# Patient Record
Sex: MALE | Race: WHITE | NOT HISPANIC OR LATINO | Employment: FULL TIME | URBAN - METROPOLITAN AREA
[De-identification: names, ages, dates, MRNs, and addresses within clinical notes are randomized per-mention and may not be internally consistent; named-entity substitution may affect disease eponyms.]

---

## 2017-08-15 ENCOUNTER — GENERIC CONVERSION - ENCOUNTER (OUTPATIENT)
Dept: OTHER | Facility: OTHER | Age: 27
End: 2017-08-15

## 2017-11-29 ENCOUNTER — ALLSCRIPTS OFFICE VISIT (OUTPATIENT)
Dept: OTHER | Facility: OTHER | Age: 27
End: 2017-11-29
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
## 2017-12-17 ENCOUNTER — LAB CONVERSION - ENCOUNTER (OUTPATIENT)
Dept: OTHER | Facility: OTHER | Age: 27
End: 2017-12-17

## 2017-12-17 LAB
25(OH)D3 SERPL-MCNC: 26 NG/ML (ref 30–100)
A/G RATIO (HISTORICAL): 1.5 (CALC) (ref 1–2.5)
ALBUMIN SERPL BCP-MCNC: 4.4 G/DL (ref 3.6–5.1)
ALP SERPL-CCNC: 49 U/L (ref 40–115)
ALT SERPL W P-5'-P-CCNC: 65 U/L (ref 9–46)
AST SERPL W P-5'-P-CCNC: 37 U/L (ref 10–40)
BASOPHILS # BLD AUTO: 0.3 %
BASOPHILS # BLD AUTO: 20 CELLS/UL (ref 0–200)
BILIRUB SERPL-MCNC: 1 MG/DL (ref 0.2–1.2)
BUN SERPL-MCNC: 15 MG/DL (ref 7–25)
BUN/CREA RATIO (HISTORICAL): 10 (CALC) (ref 6–22)
CALCIUM SERPL-MCNC: 10 MG/DL (ref 8.6–10.3)
CHLORIDE SERPL-SCNC: 99 MMOL/L (ref 98–110)
CHOLEST SERPL-MCNC: 280 MG/DL
CHOLEST/HDLC SERPL: 8 (CALC)
CO2 SERPL-SCNC: 31 MMOL/L (ref 20–31)
CREAT SERPL-MCNC: 1.54 MG/DL (ref 0.6–1.35)
DEPRECATED RDW RBC AUTO: 11.9 % (ref 11–15)
EGFR AFRICAN AMERICAN (HISTORICAL): 70 ML/MIN/1.73M2
EGFR-AMERICAN CALC (HISTORICAL): 61 ML/MIN/1.73M2
EOSINOPHIL # BLD AUTO: 299 CELLS/UL (ref 15–500)
EOSINOPHIL # BLD AUTO: 4.4 %
GAMMA GLOBULIN (HISTORICAL): 2.9 G/DL (CALC) (ref 1.9–3.7)
GLUCOSE (HISTORICAL): 75 MG/DL (ref 65–99)
HCT VFR BLD AUTO: 50.7 % (ref 38.5–50)
HDLC SERPL-MCNC: 35 MG/DL
HGB BLD-MCNC: 17.9 G/DL (ref 13.2–17.1)
LDL CHOLESTEROL (HISTORICAL): 200 MG/DL (CALC)
LYMPHOCYTES # BLD AUTO: 2496 CELLS/UL (ref 850–3900)
LYMPHOCYTES # BLD AUTO: 36.7 %
MCH RBC QN AUTO: 31.6 PG (ref 27–33)
MCHC RBC AUTO-ENTMCNC: 35.3 G/DL (ref 32–36)
MCV RBC AUTO: 89.6 FL (ref 80–100)
MONOCYTES # BLD AUTO: 462 CELLS/UL (ref 200–950)
MONOCYTES (HISTORICAL): 6.8 %
NEUTROPHILS # BLD AUTO: 3522 CELLS/UL (ref 1500–7800)
NEUTROPHILS # BLD AUTO: 51.8 %
NON-HDL-CHOL (CHOL-HDL) (HISTORICAL): 245 MG/DL (CALC)
PLATELET # BLD AUTO: 184 THOUSAND/UL (ref 140–400)
PMV BLD AUTO: 11.5 FL (ref 7.5–12.5)
POTASSIUM SERPL-SCNC: 3.8 MMOL/L (ref 3.5–5.3)
RBC # BLD AUTO: 5.66 MILLION/UL (ref 4.2–5.8)
SODIUM SERPL-SCNC: 139 MMOL/L (ref 135–146)
TOTAL PROTEIN (HISTORICAL): 7.3 G/DL (ref 6.1–8.1)
TRIGL SERPL-MCNC: 253 MG/DL
TSH SERPL DL<=0.05 MIU/L-ACNC: 1.63 MIU/L (ref 0.4–4.5)
WBC # BLD AUTO: 6.8 THOUSAND/UL (ref 3.8–10.8)

## 2017-12-20 ENCOUNTER — GENERIC CONVERSION - ENCOUNTER (OUTPATIENT)
Dept: OTHER | Facility: OTHER | Age: 27
End: 2017-12-20

## 2018-01-12 NOTE — RESULT NOTES
Message   please let him know lipid/cholesterol much improved with dietary changes  keep up improved diet, exercise, and I will continue to monitor cholesterol  other labwork was fine     Verified Results  (1) LIPID PANEL, FASTING [id number] 10:01AM Grace Alexander     Test Name Result Flag Reference   CHOLESTEROL, TOTAL 218 mg/dL H 125-200   HDL CHOLESTEROL 39 mg/dL L > OR = 40   TRIGLICERIDES 858 mg/dL H <150   LDL-CHOLESTEROL 139 mg/dL (calc) H <130   Desirable range <100 mg/dL for patients with CHD or  diabetes and <70 mg/dL for diabetic patients with  known heart disease  CHOL/HDLC RATIO 5 6 (calc) H < OR = 5 0   NON HDL CHOLESTEROL 179 mg/dL (calc) H    Target for non-HDL cholesterol is 30 mg/dL higher than   LDL cholesterol target

## 2018-01-14 VITALS
WEIGHT: 188.13 LBS | HEIGHT: 66 IN | TEMPERATURE: 97.9 F | BODY MASS INDEX: 30.23 KG/M2 | HEART RATE: 70 BPM | SYSTOLIC BLOOD PRESSURE: 122 MMHG | RESPIRATION RATE: 14 BRPM | DIASTOLIC BLOOD PRESSURE: 80 MMHG

## 2018-01-15 NOTE — MISCELLANEOUS
Message  The patient had missed his follow up appointment and he is PCP is managing for now  Amrikhugo Rahda      Active Problems    1  SHARITA (acute kidney injury) (584 9) (N17 9)   2  CKD (chronic kidney disease), stage III (585 3) (N18 3)   3  Dizziness (780 4) (R42)   4  Dysfunction of both eustachian tubes (381 81) (H69 83)   5  Elevated LFTs (790 6) (R79 89)   6  Heart palpitations (785 1) (R00 2)   7  History of nephrectomy (V45 73) (Z90 5)   8  HTN (hypertension) (401 9) (I10)   9  Nicotine dependence (305 1) (F17 200)   10  Other hyperlipidemia (272 4) (E78 4)   11  Overweight (278 02) (E66 3)   12  Sweating    Current Meds   1  AmLODIPine Besylate 10 MG Oral Tablet; TAKE ONE TABLET BY MOUTH EVERY DAY   FOR BLOOD PRESSURE; Therapy: 53ARQ2373 to ([id number])  Requested for: 24HUI6889; Last   Rx:24Jun2017; Status: ACTIVE - Renewal Denied Ordered   2  Bystolic 5 MG Oral Tablet; TAKE ONE TABLET BY MOUTH EVERYDAY; Therapy: 50LPB0774 to (Mario Prasad)  Requested for: 29JBD3749; Last   Rx:24Jun2017 Ordered   3  Chantix 1 MG Oral Tablet; TAKE ONE TABLET BY MOUTH TWICE A DAY; Therapy: 27NJF8967 to (Evaluate:11Mar2017)  Requested for: 09CXL8661; Last   Rx:14Jan2017 Ordered   4  Chantix Continuing Month Sam 1 MG Oral Tablet; TAKE 1 TABLET TWICE DAILY; Therapy: 53Lbr1771 to (Maximus Diez)  Requested for: 16Lyc2495; Last   Rx:24Rzy7280 Ordered   5  Chantix Starting Month Asm 0 5 MG X 11 & 1 MG X 42 Oral Tablet; TAKE ONE 0 5MG   TABLET DAILY ON DAYS 1-3, THEN ONE 0 5MG TABLET TWICE DAILY ON DAYS 4-7,   THEN ONE 1MG TABLET TWICE DAILY THEREAFTER; Therapy: 33Dfe1531 to (Last Rx:22Ezz9684)  Requested for: 19Ipq8406 Ordered   6  Chlorthalidone 25 MG Oral Tablet; take one tablet by mouth every day; Therapy: 56AIE1830 to (22 031417)  Requested for: 27Jun2017; Last   LH:79XTK2771 Ordered   7  Zantac 150 MG Oral Tablet; TAKE 1 TABLET EVERY 12 HOURS as needed;    Therapy: (Recorded:55Auy4714) to Recorded    Allergies    1   No Known Drug Allergies    Signatures   Electronically signed by : LJ Fernandez ; Aug 15 2017  5:27PM EST                       (Author)

## 2018-01-23 NOTE — RESULT NOTES
Verified Results  (1) COMPREHENSIVE METABOLIC PANEL 27IAI4388 67:13CE Carolee Farrell     Test Name Result Flag Reference   GLUCOSE 75 mg/dL  65-99   Fasting reference interval   UREA NITROGEN (BUN) 15 mg/dL  7-25   CREATININE 1 54 mg/dL H 0 60-1 35   eGFR NON-AFR  AMERICAN 61 mL/min/1 73m2  > OR = 60   eGFR AFRICAN AMERICAN 70 mL/min/1 73m2  > OR = 60   BUN/CREATININE RATIO 10 (calc)  6-22   SODIUM 139 mmol/L  135-146   POTASSIUM 3 8 mmol/L  3 5-5 3   CHLORIDE 99 mmol/L     CARBON DIOXIDE 31 mmol/L  20-31   CALCIUM 10 0 mg/dL  8 6-10 3   PROTEIN, TOTAL 7 3 g/dL  6 1-8 1   ALBUMIN 4 4 g/dL  3 6-5 1   GLOBULIN 2 9 g/dL (calc)  1 9-3 7   ALBUMIN/GLOBULIN RATIO 1 5 (calc)  1 0-2 5   BILIRUBIN, TOTAL 1 0 mg/dL  0 2-1 2   ALKALINE PHOSPHATASE 49 U/L     AST 37 U/L  10-40   ALT 65 U/L H 9-46     (1) CBC/PLT/DIFF [id number] 10:34AM Carolee Farrell     Test Name Result Flag Reference   WHITE BLOOD CELL COUNT 6 8 Thousand/uL  3 8-10 8   RED BLOOD CELL COUNT 5 66 Million/uL  4 20-5 80   HEMOGLOBIN 17 9 g/dL H 13 2-17 1   HEMATOCRIT 50 7 % H 38 5-50 0   MCV 89 6 fL  80 0-100 0   MCH 31 6 pg  27 0-33 0   MCHC 35 3 g/dL  32 0-36 0   RDW 11 9 %  11 0-15 0   PLATELET COUNT 713 Thousand/uL  140-400   ABSOLUTE NEUTROPHILS 3522 cells/uL  4020-9316   ABSOLUTE LYMPHOCYTES 2496 cells/uL  850-3900   ABSOLUTE MONOCYTES 462 cells/uL  200-950   ABSOLUTE EOSINOPHILS 299 cells/uL     ABSOLUTE BASOPHILS 20 cells/uL  0-200   NEUTROPHILS 51 8 %     LYMPHOCYTES 36 7 %     MONOCYTES 6 8 %     EOSINOPHILS 4 4 %     BASOPHILS 0 3 %     MPV 11 5 fL  7 5-12 5     (Q) LIPID PANEL WITH REFLEX TO DIRECT LDL [id number] 10:34AM Carolee Farrell     Test Name Result Flag Reference   CHOLESTEROL, TOTAL 280 mg/dL H <200   HDL CHOLESTEROL 35 mg/dL L >79   TRIGLICERIDES 699 mg/dL H <150   LDL-CHOLESTEROL 200 mg/dL (calc) H    LDL-C levels > or = 190 mg/dL may indicate familial   hypercholesterolemia (FH)   Clinical assessment and   measurement of blood lipid levels should be considered  for all first degree relatives of patients with an   FH diagnosis  Brigitte Chirinos, et al  J National Lipid   Association Recommendations for Patient-Centered   Management of Dyslipidemia: Part 1 Journal of Clinical   Lipidology 2015;9(2), 129-169  Reference range: <100     Desirable range <100 mg/dL for patients with CHD or  diabetes and <70 mg/dL for diabetic patients with  known heart disease  LDL-C is now calculated using the Shan-Alex   calculation, which is a validated novel method providing   better accuracy than the Friedewald equation in the   estimation of LDL-C  Naif Gama  [facility]  1699;927(77): 3819-5820   (http://[employer]/faq/TMA127)   CHOL/HDLC RATIO 8 0 (calc) H <5 0   NON HDL CHOLESTEROL 245 mg/dL (calc) H <130   Non-HDL level > or = 220 is very high and may indicate   genetic familial hypercholesterolemia (FH)  Clinical   assessment and measurement of blood lipid levels   should be considered for all first-degree relatives   of patients with an FH diagnosis  For patients with diabetes plus 1 major ASCVD risk   factor, treating to a non-HDL-C goal of <100 mg/dL   (LDL-C of <70 mg/dL) is considered a therapeutic   option  *(Q) VITAMIN D, 25-HYDROXY, LC/MS/MS [id number] 10:34AM Carolee Farrell     Test Name Result Flag Reference   VITAMIN D, 25-OH, TOTAL 26 ng/mL L    Vitamin D Status         25-OH Vitamin D:     Deficiency:                    <20 ng/mL  Insufficiency:             20 - 29 ng/mL  Optimal:                 > or = 30 ng/mL     For 25-OH Vitamin D testing on patients on   D2-supplementation and patients for whom quantitation   of D2 and D3 fractions is required, the QuestAssureD(TM)  25-OH VIT D, (D2,D3), LC/MS/MS is recommended: order   code 49993 (patients >2yrs)  For more information on this test, go to:  http://[employer]/faq/QUT664  (This link is being provided for informational/educational purposes only )     (Q) TSH, 3RD GENERATION W/REFLEX TO FT4 [id number] 10:34AM Carolee Farrell   REPORT COMMENT:  FASTING:YES     Test Name Result Flag Reference   TSH W/REFLEX TO FT4 1 63 mIU/L  0 40-4 50

## 2018-03-27 RX ORDER — AMLODIPINE BESYLATE 10 MG/1
1 TABLET ORAL DAILY
COMMUNITY
Start: 2015-10-19 | End: 2018-04-15 | Stop reason: SDUPTHER

## 2018-03-27 RX ORDER — NEBIVOLOL 5 MG/1
1 TABLET ORAL DAILY
COMMUNITY
Start: 2016-07-12 | End: 2018-04-15 | Stop reason: SDUPTHER

## 2018-03-27 RX ORDER — OMEPRAZOLE 20 MG/1
1 TABLET, DELAYED RELEASE ORAL DAILY
COMMUNITY
Start: 2017-11-29 | End: 2019-05-02

## 2018-03-27 RX ORDER — CHLORTHALIDONE 25 MG/1
1 TABLET ORAL DAILY
COMMUNITY
Start: 2015-12-14 | End: 2018-04-15 | Stop reason: SDUPTHER

## 2018-03-28 ENCOUNTER — OFFICE VISIT (OUTPATIENT)
Dept: FAMILY MEDICINE CLINIC | Facility: CLINIC | Age: 28
End: 2018-03-28
Payer: COMMERCIAL

## 2018-03-28 VITALS
TEMPERATURE: 97.6 F | HEIGHT: 66 IN | HEART RATE: 60 BPM | SYSTOLIC BLOOD PRESSURE: 130 MMHG | WEIGHT: 184 LBS | DIASTOLIC BLOOD PRESSURE: 92 MMHG | RESPIRATION RATE: 16 BRPM | BODY MASS INDEX: 29.57 KG/M2

## 2018-03-28 DIAGNOSIS — N18.9 CHRONIC KIDNEY DISEASE, UNSPECIFIED CKD STAGE: ICD-10-CM

## 2018-03-28 DIAGNOSIS — E78.5 HYPERLIPIDEMIA, UNSPECIFIED HYPERLIPIDEMIA TYPE: ICD-10-CM

## 2018-03-28 DIAGNOSIS — K21.9 GASTROESOPHAGEAL REFLUX DISEASE WITHOUT ESOPHAGITIS: ICD-10-CM

## 2018-03-28 DIAGNOSIS — D58.2 ELEVATED HEMOGLOBIN (HCC): ICD-10-CM

## 2018-03-28 DIAGNOSIS — I10 HYPERTENSION, UNSPECIFIED TYPE: Primary | ICD-10-CM

## 2018-03-28 DIAGNOSIS — Z72.0 TOBACCO USE: ICD-10-CM

## 2018-03-28 DIAGNOSIS — R74.01 ELEVATED ALT MEASUREMENT: ICD-10-CM

## 2018-03-28 PROCEDURE — 3008F BODY MASS INDEX DOCD: CPT | Performed by: FAMILY MEDICINE

## 2018-03-28 PROCEDURE — 99215 OFFICE O/P EST HI 40 MIN: CPT | Performed by: FAMILY MEDICINE

## 2018-03-28 RX ORDER — MAG HYDROX/ALUMINUM HYD/SIMETH 400-400-40
1 SUSPENSION, ORAL (FINAL DOSE FORM) ORAL DAILY
COMMUNITY
End: 2019-05-02

## 2018-03-28 RX ORDER — CHOLECALCIFEROL (VITAMIN D3) 125 MCG
500 CAPSULE ORAL DAILY
COMMUNITY
End: 2019-05-02

## 2018-03-28 NOTE — PROGRESS NOTES
FAMILY PRACTICE OFFICE VISIT       NAME: Myra Finch  AGE: 33 y o  SEX: male       : 1990        MRN: [id number]    DATE: 3/29/2018  TIME: 3:58 PM    Assessment and Plan     Problem List Items Addressed This Visit     Hypertension - Primary      Patient's BP is elevated today  I have strongly urged, advised on the importance of following up with Nephrology  Also would like patient to limit sodium intake diet, start routine exercise regimen  Patient is agreeable with this  Continue with amlodipine, chlorthalidone, Bystolic and lifestyle modifications  Patient denies headaches or any other symptoms at present  Relevant Medications    amLODIPine (NORVASC) 10 mg tablet    nebivolol (BYSTOLIC) 5 mg tablet    chlorthalidone 25 mg tablet    Other Relevant Orders    Ambulatory referral to Nephrology    Hyperlipidemia       Patient does not wish to start a statin medication  He would like to work on lifestyle modifications including diet, exercise  I have provided another blood work Rx for patient to have his cholesterol Re checked  Patient is agreeable with this plan  Relevant Orders    Comprehensive metabolic panel    Lipid Panel with Direct LDL reflex    Elevated ALT measurement       Patient is a history of elevated ALT  Have provided patient with another blood work Rx to have hepatic function panel checked again  I will also order abdominal ultrasound  Relevant Orders    Comprehensive metabolic panel    US abdomen limited    Elevated hemoglobin (HCC)       Unclear etiology  Patient has a history of tobacco use  Will monitor and recheck again  If persistently elevated, will consider referral to Hematology  Relevant Orders    CBC and differential    Chronic kidney disease       I have strongly urged, advised on the importance of scheduling an appointment with Nephrology as soon as possible    Patient has been provided another referral for a physician in the quicker [facility] office  This will be more convenient for the patient  Relevant Medications    chlorthalidone 25 mg tablet    Other Relevant Orders    Ambulatory referral to Nephrology    Gastroesophageal reflux disease without esophagitis       Patient continues to take omeprazole  I would rather patient take either Zantac or Pepcid  He would have to wean himself off the omeprazole while taking the Zantac every other day  I would like patient to avoid triggers that may cause is reflux  May benefit from the [place] times best seller book- dropping acid  Relevant Medications    omeprazole (PRILOSEC OTC) 20 MG tablet    Tobacco use       Patient is currently smoking 1 pack every 2 days  I have strongly encouraged, advised and counseled on the importance of tobacco cessation  Patient states he will try  I spent 40 minutes with the patient with greater than 50 percent of time spent on counseling  follow-up in 4 months or sooner if needed      There are no Patient Instructions on file for this visit  Chief Complaint     Chief Complaint   Patient presents with    Follow-up       History of Present Illness     HPI     Patient is here for follow-up of chronic medical conditions  Patient states he is doing well overall  He will be starting a routine work out  He is currently smoking 1 pack every 2 days  He is becoming more mindful of what he is eating  He has cut out most of the sodas in the past 3 weeks  He has also cut down his 2nd cup of coffee as well  In addition has been drinking more water  Patient did not get the opportunity to schedule an appointment with Nephrology as it is farther for him  He would like another referral for nephrologist closer to his work  Review of Systems   Review of Systems   Constitutional: Negative for unexpected weight change  HENT: Negative  Eyes: Negative for visual disturbance  Respiratory: Negative for shortness of breath  Cardiovascular: Negative  Negative for chest pain, palpitations and leg swelling  Gastrointestinal: Negative for abdominal pain  Genitourinary: Negative for dysuria  Skin: Negative for rash  Neurological: Negative for headaches  Psychiatric/Behavioral: Negative for dysphoric mood and sleep disturbance  Active Problem List     Patient Active Problem List   Diagnosis    Hypertension    Hyperlipidemia    Elevated ALT measurement    Elevated hemoglobin (HCC)    Chronic kidney disease    Gastroesophageal reflux disease without esophagitis    Tobacco use       Past Medical History:  No past medical history on file  Past Surgical History:  Past Surgical History:   Procedure Laterality Date    HERNIA REPAIR      NEPHRECTOMY         Family History:  Family History   Problem Relation Age of Onset    Hypertension Mother     Heart disease Father      cardiac pacemaker    Hypertension Father        Social History:  Social History     Social History    Marital status: Single     Spouse name: N/A    Number of children: N/A    Years of education: N/A     Occupational History    Not on file  Social History Main Topics    Smoking status: Current Every Day Smoker     Packs/day: 0 50     Types: Cigarettes    Smokeless tobacco: Never Used    Alcohol use Yes    Drug use: No    Sexual activity: Not on file     Other Topics Concern    Not on file     Social History Narrative    Denied: Always uses seat belt    Caffeine use: two 20 oz cups of coffee, 2-3 regular sodas    Does not exercise     I have reviewed the patient's medical history in detail; there are no changes to the history as noted in the electronic medical record      Objective     Vitals:    03/28/18 0940   BP: 130/92   Pulse: 60   Resp: 16   Temp: 97 6 °F (36 4 °C)     Wt Readings from Last 3 Encounters:   03/28/18 83 5 kg (184 lb)   11/29/17 85 3 kg (188 lb 2 1 oz)   09/13/16 84 9 kg (187 lb 2 1 oz)       Physical Exam Constitutional: He is oriented to person, place, and time  He appears well-developed and well-nourished  HENT:   Head: Normocephalic and atraumatic  Mouth/Throat: Oropharynx is clear and moist    Eyes: Conjunctivae and EOM are normal  Pupils are equal, round, and reactive to light  Neck: Normal range of motion  Neck supple  No thyromegaly present  Cardiovascular: Normal rate and regular rhythm  No murmur heard  Pulmonary/Chest: Effort normal and breath sounds normal    Abdominal: Soft  Bowel sounds are normal  He exhibits no distension  There is no tenderness  Musculoskeletal: He exhibits no edema  Lymphadenopathy:     He has no cervical adenopathy  Neurological: He is alert and oriented to person, place, and time  Psychiatric: He has a normal mood and affect  Nursing note and vitals reviewed        Pertinent Laboratory/Diagnostic Studies:  Lab Results   Component Value Date    BUN 15 12/16/2017    CREATININE 1 54 (H) 12/16/2017    CALCIUM 10 0 12/16/2017     12/16/2017    K 3 8 12/16/2017    CO2 31 12/16/2017    CL 99 12/16/2017     Lab Results   Component Value Date    ALT 65 (H) 12/16/2017    AST 37 12/16/2017    ALKPHOS 49 12/16/2017    BILITOT 1 0 12/16/2017       Lab Results   Component Value Date    WBC 6 8 12/16/2017    HGB 17 9 (H) 12/16/2017    HCT 50 7 (H) 12/16/2017    MCV 89 6 12/16/2017     12/16/2017       No results found for: TSH    Lab Results   Component Value Date    CHOL 280 (H) 12/16/2017     Lab Results   Component Value Date    TRIG 253 (H) 12/16/2017     Lab Results   Component Value Date    HDL 35 (L) 12/16/2017     No results found for: LDLCALC  No results found for: HGBA1C    Results for orders placed or performed in visit on 12/17/17   LIPID PANEL WITH DIRECT LDL REFLEX (HISTORICAL)   Result Value Ref Range    Cholesterol 280 (H) <200 mg/dL    HDL 35 (L) >40 mg/dL    Triglycerides 253 (H) <150 mg/dL    LDL CHOLESTEROL 200 (H) mg/dL (calc)    Chol/HDL Ratio 8 0 (H) <5 0 (calc)    NON-HDL-CHOL (CHOL-HDL) 245 (H) <130 mg/dL (calc)   VITAMIN D 25 HYDROXY (HISTORICAL)   Result Value Ref Range    Vit D, 25-Hydroxy 26 (L) 30 - 100 ng/mL   TSH, 3RD GENERATION WITH T4 REFLEX (HISTORICAL)   Result Value Ref Range    TSH 3RD GENERATON 1 63 0 40 - 4 50 mIU/L       Orders Placed This Encounter   Procedures    US abdomen limited    Comprehensive metabolic panel    Lipid Panel with Direct LDL reflex    CBC and differential    Ambulatory referral to Nephrology       ALLERGIES:  No Known Allergies    Current Medications     Current Outpatient Prescriptions   Medication Sig Dispense Refill    amLODIPine (NORVASC) 10 mg tablet Take 1 tablet by mouth daily      Apple Cider Vinegar 188 MG CAPS Take by mouth      chlorthalidone 25 mg tablet Take 1 tablet by mouth daily      Cholecalciferol (VITAMIN D3) 5000 units CAPS Take 1 capsule by mouth daily      cyanocobalamin (VITAMIN B-12) 500 mcg tablet Take 500 mcg by mouth daily      nebivolol (BYSTOLIC) 5 mg tablet Take 1 tablet by mouth daily      omeprazole (PRILOSEC OTC) 20 MG tablet Take 1 tablet by mouth daily       No current facility-administered medications for this visit  Health Maintenance   There are no preventive care reminders to display for this patient  There is no immunization history for the selected administration types on file for this patient      Karen Patiño MD

## 2018-03-29 PROBLEM — I10 HYPERTENSION: Status: ACTIVE | Noted: 2018-03-29

## 2018-03-29 PROBLEM — D58.2 ELEVATED HEMOGLOBIN (HCC): Status: ACTIVE | Noted: 2018-03-29

## 2018-03-29 PROBLEM — K21.9 GASTROESOPHAGEAL REFLUX DISEASE WITHOUT ESOPHAGITIS: Status: ACTIVE | Noted: 2018-03-29

## 2018-03-29 PROBLEM — R74.01 ELEVATED ALT MEASUREMENT: Status: ACTIVE | Noted: 2018-03-29

## 2018-03-29 PROBLEM — E78.5 HYPERLIPIDEMIA: Status: ACTIVE | Noted: 2018-03-29

## 2018-03-29 PROBLEM — N18.9 CHRONIC KIDNEY DISEASE: Status: ACTIVE | Noted: 2018-03-29

## 2018-03-29 PROBLEM — Z72.0 TOBACCO USE: Status: ACTIVE | Noted: 2018-03-29

## 2018-03-29 NOTE — ASSESSMENT & PLAN NOTE
Patient is currently smoking 1 pack every 2 days  I have strongly encouraged, advised and counseled on the importance of tobacco cessation  Patient states he will try

## 2018-03-29 NOTE — ASSESSMENT & PLAN NOTE
Unclear etiology  Patient has a history of tobacco use  Will monitor and recheck again  If persistently elevated, will consider referral to Hematology

## 2018-03-29 NOTE — ASSESSMENT & PLAN NOTE
Patient is a history of elevated ALT  Have provided patient with another blood work Rx to have hepatic function panel checked again  I will also order abdominal ultrasound

## 2018-03-29 NOTE — ASSESSMENT & PLAN NOTE
Patient does not wish to start a statin medication  He would like to work on lifestyle modifications including diet, exercise  I have provided another blood work Rx for patient to have his cholesterol Re checked  Patient is agreeable with this plan

## 2018-03-29 NOTE — ASSESSMENT & PLAN NOTE
I have strongly urged, advised on the importance of scheduling an appointment with Nephrology as soon as possible  Patient has been provided another referral for a physician in the [facility] office  This will be more convenient for the patient

## 2018-03-29 NOTE — ASSESSMENT & PLAN NOTE
Patient continues to take omeprazole  I would rather patient take either Zantac or Pepcid  He would have to wean himself off the omeprazole while taking the Zantac every other day  I would like patient to avoid triggers that may cause is reflux  May benefit from the [place] times best seller book- dropping acid

## 2018-03-29 NOTE — ASSESSMENT & PLAN NOTE
Patient's BP is elevated today  I have strongly urged, advised on the importance of following up with Nephrology  Also would like patient to limit sodium intake diet, start routine exercise regimen  Patient is agreeable with this  Continue with amlodipine, chlorthalidone, Bystolic and lifestyle modifications  Patient denies headaches or any other symptoms at present

## 2018-04-15 DIAGNOSIS — I10 HYPERTENSION, UNSPECIFIED TYPE: Primary | ICD-10-CM

## 2018-04-15 RX ORDER — CHLORTHALIDONE 25 MG/1
TABLET ORAL
Qty: 30 TABLET | Refills: 3 | Status: SHIPPED | OUTPATIENT
Start: 2018-04-15 | End: 2018-07-31 | Stop reason: SDUPTHER

## 2018-04-15 RX ORDER — AMLODIPINE BESYLATE 10 MG/1
TABLET ORAL
Qty: 30 TABLET | Refills: 3 | Status: SHIPPED | OUTPATIENT
Start: 2018-04-15 | End: 2018-07-31 | Stop reason: SDUPTHER

## 2018-04-15 RX ORDER — NEBIVOLOL HYDROCHLORIDE 5 MG/1
TABLET ORAL
Qty: 30 TABLET | Refills: 3 | Status: SHIPPED | OUTPATIENT
Start: 2018-04-15 | End: 2018-07-31 | Stop reason: SDUPTHER

## 2018-07-31 DIAGNOSIS — I10 HYPERTENSION, UNSPECIFIED TYPE: ICD-10-CM

## 2018-07-31 RX ORDER — AMLODIPINE BESYLATE 10 MG/1
TABLET ORAL
Qty: 30 TABLET | Refills: 3 | Status: SHIPPED | OUTPATIENT
Start: 2018-07-31 | End: 2018-12-12 | Stop reason: SDUPTHER

## 2018-07-31 RX ORDER — CHLORTHALIDONE 25 MG/1
TABLET ORAL
Qty: 30 TABLET | Refills: 3 | Status: SHIPPED | OUTPATIENT
Start: 2018-07-31 | End: 2018-12-12 | Stop reason: SDUPTHER

## 2018-07-31 RX ORDER — NEBIVOLOL HYDROCHLORIDE 5 MG/1
TABLET ORAL
Qty: 30 TABLET | Refills: 3 | Status: SHIPPED | OUTPATIENT
Start: 2018-07-31 | End: 2018-12-12 | Stop reason: SDUPTHER

## 2018-07-31 NOTE — TELEPHONE ENCOUNTER
Can you please let patient know that I have gone ahead and filled his medications however I would like patient to schedule a follow-up appointment with Nephrology    Thank you

## 2018-12-12 DIAGNOSIS — I10 HYPERTENSION, UNSPECIFIED TYPE: ICD-10-CM

## 2018-12-12 RX ORDER — NEBIVOLOL HYDROCHLORIDE 5 MG/1
TABLET ORAL
Qty: 30 TABLET | Refills: 3 | Status: SHIPPED | OUTPATIENT
Start: 2018-12-12 | End: 2019-04-12 | Stop reason: SDUPTHER

## 2018-12-12 RX ORDER — AMLODIPINE BESYLATE 10 MG/1
TABLET ORAL
Qty: 30 TABLET | Refills: 3 | Status: SHIPPED | OUTPATIENT
Start: 2018-12-12 | End: 2019-04-12 | Stop reason: SDUPTHER

## 2018-12-12 RX ORDER — CHLORTHALIDONE 25 MG/1
TABLET ORAL
Qty: 30 TABLET | Refills: 3 | Status: SHIPPED | OUTPATIENT
Start: 2018-12-12 | End: 2019-04-12 | Stop reason: SDUPTHER

## 2018-12-12 NOTE — TELEPHONE ENCOUNTER
Can you please let patient know that I have gone ahead and filled his medications  I would like patient to see his kidney specialist as soon as possible  His blood pressures have been elevated previously and I have asked him to come in for blood pressure checks  I will not fill any further prescriptions if he does not sees kidney doctor    Thank you

## 2019-04-12 DIAGNOSIS — I10 HYPERTENSION, UNSPECIFIED TYPE: ICD-10-CM

## 2019-04-14 RX ORDER — CHLORTHALIDONE 25 MG/1
TABLET ORAL
Qty: 15 TABLET | Refills: 0 | Status: SHIPPED | OUTPATIENT
Start: 2019-04-14 | End: 2021-02-20 | Stop reason: SDUPTHER

## 2019-04-14 RX ORDER — NEBIVOLOL HYDROCHLORIDE 5 MG/1
TABLET ORAL
Qty: 15 TABLET | Refills: 0 | Status: SHIPPED | OUTPATIENT
Start: 2019-04-14 | End: 2021-02-20 | Stop reason: SDUPTHER

## 2019-04-14 RX ORDER — AMLODIPINE BESYLATE 10 MG/1
TABLET ORAL
Qty: 15 TABLET | Refills: 0 | Status: SHIPPED | OUTPATIENT
Start: 2019-04-14 | End: 2021-02-20 | Stop reason: SDUPTHER

## 2019-05-02 ENCOUNTER — OFFICE VISIT (OUTPATIENT)
Dept: FAMILY MEDICINE CLINIC | Facility: CLINIC | Age: 29
End: 2019-05-02
Payer: COMMERCIAL

## 2019-05-02 VITALS
TEMPERATURE: 96.6 F | HEIGHT: 67 IN | RESPIRATION RATE: 16 BRPM | SYSTOLIC BLOOD PRESSURE: 120 MMHG | HEART RATE: 60 BPM | BODY MASS INDEX: 28.12 KG/M2 | WEIGHT: 179.2 LBS | DIASTOLIC BLOOD PRESSURE: 80 MMHG

## 2019-05-02 DIAGNOSIS — R53.83 FATIGUE, UNSPECIFIED TYPE: ICD-10-CM

## 2019-05-02 DIAGNOSIS — Z00.00 ROUTINE HEALTH MAINTENANCE: Primary | ICD-10-CM

## 2019-05-02 DIAGNOSIS — N18.9 CHRONIC KIDNEY DISEASE, UNSPECIFIED CKD STAGE: ICD-10-CM

## 2019-05-02 DIAGNOSIS — I10 HYPERTENSION, UNSPECIFIED TYPE: ICD-10-CM

## 2019-05-02 DIAGNOSIS — E78.5 HYPERLIPIDEMIA, UNSPECIFIED HYPERLIPIDEMIA TYPE: ICD-10-CM

## 2019-05-02 LAB
SL AMB  POCT GLUCOSE, UA: NORMAL
SL AMB LEUKOCYTE ESTERASE,UA: NORMAL
SL AMB POCT BILIRUBIN,UA: NORMAL
SL AMB POCT BLOOD,UA: NORMAL
SL AMB POCT CLARITY,UA: CLEAR
SL AMB POCT COLOR,UA: YELLOW
SL AMB POCT KETONES,UA: NORMAL
SL AMB POCT PH,UA: 6.5
SL AMB POCT SPECIFIC GRAVITY,UA: 1
SL AMB POCT URINE PROTEIN: NORMAL
SL AMB POCT UROBILINOGEN: 0.2

## 2019-05-02 PROCEDURE — 81002 URINALYSIS NONAUTO W/O SCOPE: CPT | Performed by: FAMILY MEDICINE

## 2019-05-02 PROCEDURE — 99395 PREV VISIT EST AGE 18-39: CPT | Performed by: FAMILY MEDICINE

## 2019-05-02 RX ORDER — DIPHENOXYLATE HYDROCHLORIDE AND ATROPINE SULFATE 2.5; .025 MG/1; MG/1
1 TABLET ORAL DAILY
COMMUNITY
End: 2022-05-02

## 2019-05-02 RX ORDER — RANITIDINE 150 MG/1
150 TABLET ORAL 2 TIMES DAILY
COMMUNITY
End: 2021-02-20

## 2019-05-04 DIAGNOSIS — I10 HYPERTENSION, UNSPECIFIED TYPE: ICD-10-CM

## 2019-05-04 RX ORDER — CHLORTHALIDONE 25 MG/1
TABLET ORAL
Qty: 30 TABLET | Refills: 3 | Status: SHIPPED | OUTPATIENT
Start: 2019-05-04 | End: 2019-09-06 | Stop reason: SDUPTHER

## 2019-05-04 RX ORDER — NEBIVOLOL HYDROCHLORIDE 5 MG/1
TABLET ORAL
Qty: 30 TABLET | Refills: 3 | Status: SHIPPED | OUTPATIENT
Start: 2019-05-04 | End: 2019-09-06 | Stop reason: SDUPTHER

## 2019-05-04 RX ORDER — AMLODIPINE BESYLATE 10 MG/1
TABLET ORAL
Qty: 30 TABLET | Refills: 3 | Status: SHIPPED | OUTPATIENT
Start: 2019-05-04 | End: 2019-09-06 | Stop reason: SDUPTHER

## 2019-06-03 ENCOUNTER — TELEPHONE (OUTPATIENT)
Dept: NEPHROLOGY | Facility: CLINIC | Age: 29
End: 2019-06-03

## 2019-06-03 DIAGNOSIS — I10 HYPERTENSION, UNSPECIFIED TYPE: Primary | ICD-10-CM

## 2019-06-03 DIAGNOSIS — E78.5 HYPERLIPIDEMIA, UNSPECIFIED HYPERLIPIDEMIA TYPE: ICD-10-CM

## 2019-07-03 ENCOUNTER — TELEPHONE (OUTPATIENT)
Dept: NEPHROLOGY | Facility: CLINIC | Age: 29
End: 2019-07-03

## 2019-09-06 DIAGNOSIS — I10 HYPERTENSION, UNSPECIFIED TYPE: ICD-10-CM

## 2019-09-06 RX ORDER — AMLODIPINE BESYLATE 10 MG/1
TABLET ORAL
Qty: 30 TABLET | Refills: 3 | Status: SHIPPED | OUTPATIENT
Start: 2019-09-06 | End: 2021-02-20

## 2019-09-06 RX ORDER — CHLORTHALIDONE 25 MG/1
TABLET ORAL
Qty: 30 TABLET | Refills: 3 | Status: SHIPPED | OUTPATIENT
Start: 2019-09-06 | End: 2021-02-20

## 2019-09-06 RX ORDER — NEBIVOLOL HYDROCHLORIDE 5 MG/1
TABLET ORAL
Qty: 30 TABLET | Refills: 3 | Status: SHIPPED | OUTPATIENT
Start: 2019-09-06 | End: 2021-02-20

## 2019-09-06 NOTE — TELEPHONE ENCOUNTER
Can you please advise patient to schedule appointment with Nephrology  It looks like he did no show for his appointment    Thank you

## 2019-09-28 ENCOUNTER — OFFICE VISIT (OUTPATIENT)
Dept: URGENT CARE | Facility: CLINIC | Age: 29
End: 2019-09-28
Payer: COMMERCIAL

## 2019-09-28 VITALS
TEMPERATURE: 99.6 F | SYSTOLIC BLOOD PRESSURE: 140 MMHG | RESPIRATION RATE: 16 BRPM | DIASTOLIC BLOOD PRESSURE: 86 MMHG | HEART RATE: 88 BPM | HEIGHT: 67 IN | BODY MASS INDEX: 28.88 KG/M2 | OXYGEN SATURATION: 98 % | WEIGHT: 184 LBS

## 2019-09-28 DIAGNOSIS — L60.0 INGROWN TOENAIL OF RIGHT FOOT WITH INFECTION: Primary | ICD-10-CM

## 2019-09-28 PROCEDURE — G0382 LEV 3 HOSP TYPE B ED VISIT: HCPCS | Performed by: PHYSICIAN ASSISTANT

## 2019-09-28 RX ORDER — CEPHALEXIN 500 MG/1
500 CAPSULE ORAL EVERY 6 HOURS SCHEDULED
Qty: 28 CAPSULE | Refills: 0 | Status: SHIPPED | OUTPATIENT
Start: 2019-09-28 | End: 2019-10-05

## 2019-09-28 NOTE — PATIENT INSTRUCTIONS
Ingrown Nail   AMBULATORY CARE:   An ingrown nail  is when the edge of your fingernail or toenail grows into the skin next to it  The most common cause is when nails are trimmed too short  Common symptoms include the following:   · Painful, red, and swollen skin around your nail    · Sharp pain when you walk    · Pus around your cuticle (skin around your nail)  Seek care immediately if:   · You have a red streak running up your leg or arm  Contact your healthcare provider if:   · Your pain is getting worse  · Your nail and skin are more swollen or start to drain pus  · You have a fever or chills  · Your ingrown nail is not better in 7 days  · You have questions or concerns about your condition or care  Medicines:   · Acetaminophen  decreases pain and can be bought without a doctor's order  Ask how much to take and how often to take it  Follow directions  Acetaminophen can cause liver damage if not taken correctly  · NSAIDs , such as ibuprofen, help decrease swelling, pain, and fever  This medicine is available with or without a doctor's order  NSAIDs can cause stomach bleeding or kidney problems in certain people  If you take blood thinner medicine, always ask your healthcare provider if NSAIDs are safe for you  Always read the medicine label and follow directions  · Antibiotics  help treat or prevent a bacterial infection  They may be given as an ointment, pill, or both  · Take your medicine as directed  Contact your healthcare provider if you think your medicine is not helping or if you have side effects  Tell him or her if you are allergic to any medicine  Keep a list of the medicines, vitamins, and herbs you take  Include the amounts, and when and why you take them  Bring the list or the pill bottles to follow-up visits  Carry your medicine list with you in case of an emergency  Self-care:   · Soak and lift the nail    Soak your ingrown nail in warm water for 20 minutes, 2 to 3 times each day  Then gently lift the edge of the ingrown nail away from the skin  Wedge a small piece of cotton or gauze under the corner of the nail  You can also put dental floss under the nail to lift the edge away from the skin  This may help keep the nail from growing into the skin  · Keep your nails clean and dry  Wash your hands and feet with soap and water  Pat dry with a clean towel  Dry in between each toe  Do not put lotion between your toes  Prevent another ingrown nail:   · Carefully trim your nails  Cut your nails straight across  Do not cut them too short  Lightly file the nail corners if you have sharp edges  Do not round your nails  Do not rip or tear off the tips of your nails  This may cause your nail edge to grow into the skin  Use clippers, not nail scissors  · Wear shoes and socks that fit well  Make sure they are not too tight  You may need to wear a shoe with the toe cut out, such as sandals, until your ingrown toenail heals  Do not wear shoes that have pointed toes or heels that are more than 2 inches high  Do not wear tight hose or socks  Wear socks that pull moisture away from your feet, such as cotton-acrylic blends  · Inspect your nails daily  Look for signs of an ingrown nail  Manage problems early so the nail does not become infected  Follow up with your healthcare provider as directed: You may be referred to a podiatrist  Write down your questions so you remember to ask them during your visits  © 2017 [employer] Information is for End User's use only and may not be sold, redistributed or otherwise used for commercial purposes  All illustrations and images included in CareNotes® are the copyrighted property of [employer]  or [employer]  The above information is an  only  It is not intended as medical advice for individual conditions or treatments   Talk to your doctor, nurse or pharmacist before following any medical regimen to see if it is safe and effective for you

## 2019-10-07 NOTE — PROGRESS NOTES
Assessment/Plan    Ingrown toenail of right foot with infection [L60 0]  1  Ingrown toenail of right foot with infection  cephalexin (KEFLEX) 500 mg capsule         Subjective:     Patient ID: Anival Escobar is a 26 y o  male  Reason For Visit / Chief Complaint  Chief Complaint   Patient presents with    Ingrown Toenail     Right great toe ingrown toenail  Had it for a while  Cutting nail back  Tender area, red, pus          60-year-old male presents to the clinic with right great toe pain from ingrown toenail  Patient states the area is red, swollen, painful when wearing shoes and draining pus  Patient states he has been having issues with this for several months but states that he has not had a chance to get this fixed and wants to have the ingrown toenail cut out  Patient states he has been cutting the nail back at an angle on that side making it worse  Patient notes swelling to the side of the nail bed  History reviewed  No pertinent past medical history  Past Surgical History:   Procedure Laterality Date    HERNIA REPAIR      NEPHRECTOMY         Family History   Problem Relation Age of Onset    Hypertension Mother     Heart disease Father         cardiac pacemaker    Hypertension Father        Review of Systems   Constitutional: Negative for chills and fever  Musculoskeletal: Positive for gait problem and myalgias  Negative for arthralgias and joint swelling  Skin: Positive for color change and wound  Neurological: Negative for weakness and numbness  Objective:    /86   Pulse 88   Temp 99 6 °F (37 6 °C)   Resp 16   Ht 5' 7" (1 702 m)   Wt 83 5 kg (184 lb)   SpO2 98%   BMI 28 82 kg/m²     Physical Exam   Constitutional: He is oriented to person, place, and time  Vital signs are normal  He appears well-developed and well-nourished  No distress  HENT:   Head: Normocephalic and atraumatic  Cardiovascular: Intact distal pulses     Pulses:       Dorsalis pedis pulses are 2+ on the right side  Pulmonary/Chest: Effort normal    Musculoskeletal: Normal range of motion  Right foot: There is tenderness and swelling  There is normal range of motion, no bony tenderness, normal capillary refill, no crepitus, no deformity and no laceration  Feet:    Erythema and swelling noted with dried pus to the lateral side of the great toe nail bed on right foot, tenderness to palpation of area and pus drainage when pressed  Nail is cut it in an angle  Discussed with patient that at this time will not be cutting his nail due to signs of infection  Patient can follow up with Podiatry  Feet:   Right Foot:   Skin Integrity: Positive for erythema and warmth  Neurological: He is alert and oriented to person, place, and time  Skin: Skin is warm and dry  He is not diaphoretic  Nursing note and vitals reviewed

## 2019-11-18 ENCOUNTER — OFFICE VISIT (OUTPATIENT)
Dept: PODIATRY | Facility: CLINIC | Age: 29
End: 2019-11-18
Payer: COMMERCIAL

## 2019-11-18 VITALS
WEIGHT: 184 LBS | SYSTOLIC BLOOD PRESSURE: 135 MMHG | HEIGHT: 67 IN | DIASTOLIC BLOOD PRESSURE: 81 MMHG | BODY MASS INDEX: 28.88 KG/M2

## 2019-11-18 DIAGNOSIS — M79.674 PAIN IN RIGHT TOE(S): ICD-10-CM

## 2019-11-18 DIAGNOSIS — L03.031 CELLULITIS OF GREAT TOE, RIGHT: Primary | ICD-10-CM

## 2019-11-18 DIAGNOSIS — L60.0 INGROWN TOENAIL: ICD-10-CM

## 2019-11-18 PROCEDURE — 10060 I&D ABSCESS SIMPLE/SINGLE: CPT | Performed by: PODIATRIST

## 2019-11-18 PROCEDURE — 99203 OFFICE O/P NEW LOW 30 MIN: CPT | Performed by: PODIATRIST

## 2019-11-18 RX ORDER — CEPHALEXIN 500 MG/1
500 CAPSULE ORAL EVERY 8 HOURS SCHEDULED
Qty: 21 CAPSULE | Refills: 0 | Status: SHIPPED | OUTPATIENT
Start: 2019-11-18 | End: 2019-11-25

## 2019-11-18 RX ORDER — LIDOCAINE HYDROCHLORIDE 10 MG/ML
1 INJECTION, SOLUTION EPIDURAL; INFILTRATION; INTRACAUDAL; PERINEURAL ONCE
Status: COMPLETED | OUTPATIENT
Start: 2019-11-18 | End: 2019-11-18

## 2019-11-18 RX ADMIN — LIDOCAINE HYDROCHLORIDE 1 ML: 10 INJECTION, SOLUTION EPIDURAL; INFILTRATION; INTRACAUDAL; PERINEURAL at 15:24

## 2019-11-18 NOTE — PROGRESS NOTES
PATIENT:  Ashli Son  1990       ASSESSMENT:     1  Cellulitis of great toe, right  lidocaine (PF) (XYLOCAINE-MPF) 1 % injection 1 mL    Incision and Drainage    cephalexin (KEFLEX) 500 mg capsule   2  Ingrown toenail     3  Pain in right toe(s)             PLAN:  Patient was counseled and educated on the condition and the diagnosis  The diagnosis, treatment options and prognosis were discussed with the patient  He has infection on right great toe related to ingrown nail  Discussed options and recommended I&D / partial nail avulsion  The surgery was done as in the procedure  Restart him on oral abx  Rx: Keflex  Home care instructions were given to the patient including decreased activity, ice and home pain medication as needed for 3 days  Patient will also perform daily dressing changes until the surgical site is fully healed  Patient tolerated the procedure well and with no complications  The patient will return in 1-2 weeks for follow-up  Incision and Drainage  Date/Time: 11/18/2019 3:03 PM  Performed by: Shala Herman DPM  Authorized by: Shala Herman DPM     Patient location:  Bedside  Consent:     Consent obtained:  Verbal    Consent given by:  Patient    Risks discussed:  Bleeding, infection, incomplete drainage and pain    Alternatives discussed:  Alternative treatment  Universal protocol:     Procedure explained and questions answered to patient or proxy's satisfaction: yes      Patient identity confirmed:  Verbally with patient  Location:     Type:  Abscess    Location:  Lower extremity    Lower extremity location:  R big toe  Pre-procedure details:     Skin preparation:  Betadine  Anesthesia (see MAR for exact dosages):      Anesthesia method:  Local infiltration    Local anesthetic:  Lidocaine 1% w/o epi  Procedure details:     Complexity:  Simple    Incision types:  Stab incision    Scalpel size: 61     Approach:  Open    Incision depth:  Superficial and subungual (Medial nail plate)    Wound management:  Irrigated with saline    Drainage:  Purulent    Drainage amount:  Scant    Wound treatment:  Wound left open    Packing material: Bacitracin/ adaptic / dry sterile dressing  Post-procedure details:     Patient tolerance of procedure: Tolerated well, no immediate complications          Subjective:       HPI  The patient presents with chief complaint of pain and infection to right great toe  He started ingrown nail in September  He was treating it at home and got infected  He went to urgent care center at that time  He was put on oral antibiotics  It was getting better, but had been worse in the last 3 weeks  He has throbbing sensation on right great toe with redness and edema  No associated numbness or paresthesia  No significant weakness  The following portions of the patient's history were reviewed and updated as appropriate: allergies, current medications, past family history, past medical history, past social history, past surgical history and problem list   All pertinent labs and images were reviewed  Past Medical History  History reviewed  No pertinent past medical history  Past Surgical History  Past Surgical History:   Procedure Laterality Date    HERNIA REPAIR      NEPHRECTOMY          Allergies:  Patient has no known allergies      Medications:  Current Outpatient Medications   Medication Sig Dispense Refill    amLODIPine (NORVASC) 10 mg tablet TAKE ONE TABLET BY MOUTH EVERY DAY FOR BLOOD PRESSURE 15 tablet 0    amLODIPine (NORVASC) 10 mg tablet TAKE ONE TABLET BY MOUTH EVERY DAY FOR BLOOD PRESSURE 30 tablet 3    Apple Cider Vinegar 188 MG CAPS Take by mouth      BYSTOLIC 5 MG tablet TAKE ONE TABLET BY MOUTH EVERY DAY 15 tablet 0    BYSTOLIC 5 MG tablet TAKE ONE TABLET BY MOUTH EVERY DAY 30 tablet 3    cephalexin (KEFLEX) 500 mg capsule Take 1 capsule (500 mg total) by mouth every 8 (eight) hours for 7 days 21 capsule 0    chlorthalidone 25 mg tablet TAKE ONE TABLET BY MOUTH EVERY DAY 15 tablet 0    chlorthalidone 25 mg tablet TAKE ONE TABLET BY MOUTH EVERY DAY 30 tablet 3    multivitamin (THERAGRAN) TABS Take 1 tablet by mouth daily      ranitidine (ZANTAC) 150 mg tablet Take 150 mg by mouth 2 (two) times a day       Current Facility-Administered Medications   Medication Dose Route Frequency Provider Last Rate Last Dose    lidocaine (PF) (XYLOCAINE-MPF) 1 % injection 1 mL  1 mL Injection Once Sukumar Donahue DPM           Social History:  Social History     Socioeconomic History    Marital status: Single     Spouse name: None    Number of children: None    Years of education: None    Highest education level: None   Occupational History    None   Social Needs    Financial resource strain: None    Food insecurity:     Worry: None     Inability: None    Transportation needs:     Medical: None     Non-medical: None   Tobacco Use    Smoking status: Former Smoker     Packs/day: 0 50     Types: Cigarettes    Smokeless tobacco: Never Used   Substance and Sexual Activity    Alcohol use: Yes    Drug use: No    Sexual activity: None   Lifestyle    Physical activity:     Days per week: None     Minutes per session: None    Stress: None   Relationships    Social connections:     Talks on phone: None     Gets together: None     Attends Congregational service: None     Active member of club or organization: None     Attends meetings of clubs or organizations: None     Relationship status: None    Intimate partner violence:     Fear of current or ex partner: None     Emotionally abused: None     Physically abused: None     Forced sexual activity: None   Other Topics Concern    None   Social History Narrative    Denied: Always uses seat belt    Caffeine use: two 20 oz cups of coffee, 2-3 regular sodas    Does not exercise          Review of Systems   Constitutional: Negative for chills and fever     Respiratory: Negative for cough and shortness of breath  Cardiovascular: Negative for chest pain and leg swelling  Gastrointestinal: Negative for diarrhea, nausea and vomiting  Musculoskeletal: Negative for gait problem  Skin: Positive for color change  Allergic/Immunologic: Negative for immunocompromised state  Neurological: Negative for weakness and numbness  Hematological: Does not bruise/bleed easily  Psychiatric/Behavioral: Negative for behavioral problems  Objective:      /81   Ht 5' 7" (1 702 m)   Wt 83 5 kg (184 lb)   BMI 28 82 kg/m²          Physical Exam   Constitutional: He is oriented to person, place, and time  He appears well-developed and well-nourished  No distress  HENT:   Head: Normocephalic and atraumatic  Neck: Normal range of motion  Neck supple  Cardiovascular: Normal rate, regular rhythm and intact distal pulses  Pulmonary/Chest: Effort normal and breath sounds normal  No respiratory distress  Musculoskeletal: Normal range of motion  He exhibits no edema or deformity  Neurological: He is alert and oriented to person, place, and time  No sensory deficit  Coordination normal    Skin: Capillary refill takes less than 2 seconds  No rash noted  Granuloma and purulence around medial nail border right great toe  No ascending cellulitis to foot or leg right  Psychiatric: He has a normal mood and affect  His behavior is normal    Vitals reviewed

## 2020-05-24 ENCOUNTER — HOSPITAL ENCOUNTER (EMERGENCY)
Facility: HOSPITAL | Age: 30
Discharge: HOME | End: 2020-05-24
Attending: EMERGENCY MEDICINE | Admitting: EMERGENCY MEDICINE
Payer: COMMERCIAL

## 2020-05-24 ENCOUNTER — APPOINTMENT (EMERGENCY)
Dept: CT IMAGING | Facility: HOSPITAL | Age: 30
End: 2020-05-24
Payer: COMMERCIAL

## 2020-05-24 ENCOUNTER — APPOINTMENT (EMERGENCY)
Dept: RADIOLOGY | Facility: HOSPITAL | Age: 30
End: 2020-05-24
Payer: COMMERCIAL

## 2020-05-24 VITALS
OXYGEN SATURATION: 100 % | BODY MASS INDEX: 30.73 KG/M2 | SYSTOLIC BLOOD PRESSURE: 140 MMHG | HEART RATE: 77 BPM | TEMPERATURE: 98.5 F | DIASTOLIC BLOOD PRESSURE: 83 MMHG | RESPIRATION RATE: 18 BRPM | WEIGHT: 196.21 LBS

## 2020-05-24 DIAGNOSIS — N17.9 ACUTE KIDNEY INJURY (HCC): ICD-10-CM

## 2020-05-24 DIAGNOSIS — R42 VERTIGO: ICD-10-CM

## 2020-05-24 DIAGNOSIS — S06.0X9A CONCUSSION: ICD-10-CM

## 2020-05-24 DIAGNOSIS — S01.511A LIP LACERATION, INITIAL ENCOUNTER: ICD-10-CM

## 2020-05-24 DIAGNOSIS — S01.81XA FACIAL LACERATION, INITIAL ENCOUNTER: Primary | ICD-10-CM

## 2020-05-24 DIAGNOSIS — J39.2 NASOPHARYNGEAL MASS: ICD-10-CM

## 2020-05-24 LAB
ANION GAP SERPL CALCULATED.3IONS-SCNC: 10 MMOL/L (ref 4–13)
BASOPHILS # BLD AUTO: 0.02 THOUSANDS/ΜL (ref 0–0.1)
BASOPHILS NFR BLD AUTO: 0 % (ref 0–1)
BILIRUB UR QL STRIP: NEGATIVE
BUN SERPL-MCNC: 16 MG/DL (ref 5–25)
CALCIUM SERPL-MCNC: 9.3 MG/DL (ref 8.3–10.1)
CHLORIDE SERPL-SCNC: 102 MMOL/L (ref 100–108)
CLARITY UR: CLEAR
CLARITY, POC: CLEAR
CO2 SERPL-SCNC: 28 MMOL/L (ref 21–32)
COLOR UR: YELLOW
COLOR, POC: YELLOW
CREAT SERPL-MCNC: 1.47 MG/DL (ref 0.6–1.3)
EOSINOPHIL # BLD AUTO: 0.06 THOUSAND/ΜL (ref 0–0.61)
EOSINOPHIL NFR BLD AUTO: 1 % (ref 0–6)
ERYTHROCYTE [DISTWIDTH] IN BLOOD BY AUTOMATED COUNT: 11.1 % (ref 11.6–15.1)
GFR SERPL CREATININE-BSD FRML MDRD: 63 ML/MIN/1.73SQ M
GLUCOSE SERPL-MCNC: 100 MG/DL (ref 65–140)
GLUCOSE UR STRIP-MCNC: NEGATIVE MG/DL
HCT VFR BLD AUTO: 45 % (ref 36.5–49.3)
HGB BLD-MCNC: 15.8 G/DL (ref 12–17)
HGB UR QL STRIP.AUTO: NEGATIVE
IMM GRANULOCYTES # BLD AUTO: 0.02 THOUSAND/UL (ref 0–0.2)
IMM GRANULOCYTES NFR BLD AUTO: 0 % (ref 0–2)
KETONES UR STRIP-MCNC: NEGATIVE MG/DL
LEUKOCYTE ESTERASE UR QL STRIP: NEGATIVE
LYMPHOCYTES # BLD AUTO: 1.35 THOUSANDS/ΜL (ref 0.6–4.47)
LYMPHOCYTES NFR BLD AUTO: 17 % (ref 14–44)
MCH RBC QN AUTO: 31 PG (ref 26.8–34.3)
MCHC RBC AUTO-ENTMCNC: 35.1 G/DL (ref 31.4–37.4)
MCV RBC AUTO: 88 FL (ref 82–98)
MONOCYTES # BLD AUTO: 0.36 THOUSAND/ΜL (ref 0.17–1.22)
MONOCYTES NFR BLD AUTO: 5 % (ref 4–12)
NEUTROPHILS # BLD AUTO: 6.05 THOUSANDS/ΜL (ref 1.85–7.62)
NEUTS SEG NFR BLD AUTO: 77 % (ref 43–75)
NITRITE UR QL STRIP: NEGATIVE
NRBC BLD AUTO-RTO: 0 /100 WBCS
PH UR STRIP.AUTO: 5.5 [PH] (ref 4.5–8)
PLATELET # BLD AUTO: 201 THOUSANDS/UL (ref 149–390)
PMV BLD AUTO: 10.6 FL (ref 8.9–12.7)
POTASSIUM SERPL-SCNC: 3.9 MMOL/L (ref 3.5–5.3)
PROT UR STRIP-MCNC: NEGATIVE MG/DL
RBC # BLD AUTO: 5.1 MILLION/UL (ref 3.88–5.62)
SODIUM SERPL-SCNC: 140 MMOL/L (ref 136–145)
SP GR UR STRIP.AUTO: <=1.005 (ref 1–1.03)
UROBILINOGEN UR QL STRIP.AUTO: 0.2 E.U./DL
WBC # BLD AUTO: 7.86 THOUSAND/UL (ref 4.31–10.16)

## 2020-05-24 PROCEDURE — 85025 COMPLETE CBC W/AUTO DIFF WBC: CPT | Performed by: PHYSICIAN ASSISTANT

## 2020-05-24 PROCEDURE — 81003 URINALYSIS AUTO W/O SCOPE: CPT

## 2020-05-24 PROCEDURE — 71045 X-RAY EXAM CHEST 1 VIEW: CPT

## 2020-05-24 PROCEDURE — 99284 EMERGENCY DEPT VISIT MOD MDM: CPT

## 2020-05-24 PROCEDURE — 96360 HYDRATION IV INFUSION INIT: CPT

## 2020-05-24 PROCEDURE — 93005 ELECTROCARDIOGRAM TRACING: CPT

## 2020-05-24 PROCEDURE — 72125 CT NECK SPINE W/O DYE: CPT

## 2020-05-24 PROCEDURE — 70488 CT MAXILLOFACIAL W/O & W/DYE: CPT

## 2020-05-24 PROCEDURE — 99284 EMERGENCY DEPT VISIT MOD MDM: CPT | Performed by: PHYSICIAN ASSISTANT

## 2020-05-24 PROCEDURE — 80048 BASIC METABOLIC PNL TOTAL CA: CPT | Performed by: PHYSICIAN ASSISTANT

## 2020-05-24 PROCEDURE — 36415 COLL VENOUS BLD VENIPUNCTURE: CPT | Performed by: PHYSICIAN ASSISTANT

## 2020-05-24 PROCEDURE — 90471 IMMUNIZATION ADMIN: CPT

## 2020-05-24 PROCEDURE — 70450 CT HEAD/BRAIN W/O DYE: CPT

## 2020-05-24 PROCEDURE — 12014 RPR F/E/E/N/L/M 5.1-7.5 CM: CPT | Performed by: PHYSICIAN ASSISTANT

## 2020-05-24 PROCEDURE — 90715 TDAP VACCINE 7 YRS/> IM: CPT | Performed by: PHYSICIAN ASSISTANT

## 2020-05-24 RX ORDER — AMOXICILLIN 250 MG/1
500 CAPSULE ORAL ONCE
Status: COMPLETED | OUTPATIENT
Start: 2020-05-24 | End: 2020-05-24

## 2020-05-24 RX ORDER — ACETAMINOPHEN 325 MG/1
975 TABLET ORAL ONCE AS NEEDED
Status: DISCONTINUED | OUTPATIENT
Start: 2020-05-24 | End: 2020-05-24 | Stop reason: HOSPADM

## 2020-05-24 RX ORDER — MECLIZINE HCL 12.5 MG/1
12.5 TABLET ORAL 3 TIMES DAILY PRN
Qty: 30 TABLET | Refills: 0 | Status: SHIPPED | OUTPATIENT
Start: 2020-05-24 | End: 2021-02-20

## 2020-05-24 RX ORDER — AMOXICILLIN 500 MG/1
500 CAPSULE ORAL 3 TIMES DAILY
Qty: 21 CAPSULE | Refills: 0 | Status: SHIPPED | OUTPATIENT
Start: 2020-05-24 | End: 2020-05-31

## 2020-05-24 RX ORDER — BACITRACIN, NEOMYCIN, POLYMYXIN B 400; 3.5; 5 [USP'U]/G; MG/G; [USP'U]/G
1 OINTMENT TOPICAL ONCE
Status: COMPLETED | OUTPATIENT
Start: 2020-05-24 | End: 2020-05-24

## 2020-05-24 RX ORDER — BUPIVACAINE HYDROCHLORIDE 2.5 MG/ML
10 INJECTION, SOLUTION EPIDURAL; INFILTRATION; INTRACAUDAL ONCE
Status: COMPLETED | OUTPATIENT
Start: 2020-05-24 | End: 2020-05-24

## 2020-05-24 RX ORDER — MECLIZINE HCL 12.5 MG/1
12.5 TABLET ORAL 3 TIMES DAILY PRN
Qty: 30 TABLET | Refills: 0 | Status: SHIPPED | OUTPATIENT
Start: 2020-05-24 | End: 2020-05-24 | Stop reason: SDUPTHER

## 2020-05-24 RX ORDER — AMOXICILLIN 500 MG/1
500 CAPSULE ORAL 3 TIMES DAILY
Qty: 21 CAPSULE | Refills: 0 | Status: SHIPPED | OUTPATIENT
Start: 2020-05-24 | End: 2020-05-24 | Stop reason: SDUPTHER

## 2020-05-24 RX ADMIN — BACITRACIN, NEOMYCIN, POLYMYXIN B 1 SMALL APPLICATION: 400; 3.5; 5 OINTMENT TOPICAL at 20:39

## 2020-05-24 RX ADMIN — TETANUS TOXOID, REDUCED DIPHTHERIA TOXOID AND ACELLULAR PERTUSSIS VACCINE, ADSORBED 0.5 ML: 5; 2.5; 8; 8; 2.5 SUSPENSION INTRAMUSCULAR at 17:23

## 2020-05-24 RX ADMIN — AMOXICILLIN 500 MG: 250 CAPSULE ORAL at 20:38

## 2020-05-24 RX ADMIN — BUPIVACAINE HYDROCHLORIDE 10 ML: 2.5 INJECTION, SOLUTION EPIDURAL; INFILTRATION; INTRACAUDAL at 17:38

## 2020-05-24 RX ADMIN — SODIUM CHLORIDE 1000 ML: 0.9 INJECTION, SOLUTION INTRAVENOUS at 20:36

## 2020-05-24 RX ADMIN — IOHEXOL 85 ML: 350 INJECTION, SOLUTION INTRAVENOUS at 19:48

## 2020-05-25 LAB
ATRIAL RATE: 76 BPM
P AXIS: 52 DEGREES
PR INTERVAL: 160 MS
QRS AXIS: 49 DEGREES
QRSD INTERVAL: 80 MS
QT INTERVAL: 362 MS
QTC INTERVAL: 407 MS
T WAVE AXIS: 25 DEGREES
VENTRICULAR RATE: 76 BPM

## 2020-05-25 PROCEDURE — 93010 ELECTROCARDIOGRAM REPORT: CPT | Performed by: INTERNAL MEDICINE

## 2020-05-29 ENCOUNTER — TELEPHONE (OUTPATIENT)
Dept: EMERGENCY DEPT | Facility: HOSPITAL | Age: 30
End: 2020-05-29

## 2020-06-10 ENCOUNTER — HOSPITAL ENCOUNTER (OUTPATIENT)
Dept: RADIOLOGY | Facility: HOSPITAL | Age: 30
Discharge: HOME | End: 2020-06-10
Attending: OTOLARYNGOLOGY
Payer: COMMERCIAL

## 2020-06-10 DIAGNOSIS — J34.89 MASS OF PARANASAL SINUS: ICD-10-CM

## 2020-06-10 DIAGNOSIS — H90.42 SENSORINEURAL HEARING LOSS (SNHL) OF LEFT EAR WITH UNRESTRICTED HEARING OF RIGHT EAR: ICD-10-CM

## 2020-06-10 PROCEDURE — 70553 MRI BRAIN STEM W/O & W/DYE: CPT

## 2020-06-10 PROCEDURE — 70543 MRI ORBT/FAC/NCK W/O &W/DYE: CPT

## 2020-06-10 PROCEDURE — A9585 GADOBUTROL INJECTION: HCPCS | Performed by: OTOLARYNGOLOGY

## 2020-06-10 RX ADMIN — GADOBUTROL 9 ML: 604.72 INJECTION INTRAVENOUS at 21:50

## 2021-02-20 ENCOUNTER — OFFICE VISIT (OUTPATIENT)
Dept: FAMILY MEDICINE CLINIC | Facility: CLINIC | Age: 31
End: 2021-02-20
Payer: COMMERCIAL

## 2021-02-20 VITALS
WEIGHT: 190 LBS | BODY MASS INDEX: 30.53 KG/M2 | TEMPERATURE: 98.7 F | HEIGHT: 66 IN | SYSTOLIC BLOOD PRESSURE: 138 MMHG | DIASTOLIC BLOOD PRESSURE: 82 MMHG | HEART RATE: 110 BPM | OXYGEN SATURATION: 97 %

## 2021-02-20 DIAGNOSIS — L60.0 INGROWN TOENAIL OF BOTH FEET: Primary | ICD-10-CM

## 2021-02-20 DIAGNOSIS — N18.31 STAGE 3A CHRONIC KIDNEY DISEASE (HCC): ICD-10-CM

## 2021-02-20 DIAGNOSIS — I10 HYPERTENSION, UNSPECIFIED TYPE: ICD-10-CM

## 2021-02-20 DIAGNOSIS — E78.5 HYPERLIPIDEMIA, UNSPECIFIED HYPERLIPIDEMIA TYPE: ICD-10-CM

## 2021-02-20 PROBLEM — J32.1 CHRONIC FRONTAL SINUSITIS: Status: RESOLVED | Noted: 2020-06-16 | Resolved: 2021-02-20

## 2021-02-20 PROBLEM — D36.10 SCHWANNOMA: Status: ACTIVE | Noted: 2020-06-16

## 2021-02-20 PROBLEM — R74.01 ELEVATED ALT MEASUREMENT: Status: RESOLVED | Noted: 2018-03-29 | Resolved: 2021-02-20

## 2021-02-20 PROBLEM — H93.12 TINNITUS OF LEFT EAR: Status: ACTIVE | Noted: 2020-06-16

## 2021-02-20 PROBLEM — Z72.0 TOBACCO USE: Status: RESOLVED | Noted: 2018-03-29 | Resolved: 2021-02-20

## 2021-02-20 PROBLEM — C41.0: Status: RESOLVED | Noted: 2020-06-16 | Resolved: 2021-02-20

## 2021-02-20 PROBLEM — D36.10 SCHWANNOMA: Status: RESOLVED | Noted: 2020-06-16 | Resolved: 2021-02-20

## 2021-02-20 PROBLEM — D10.6: Status: ACTIVE | Noted: 2020-06-16

## 2021-02-20 PROBLEM — J32.3 CHRONIC SPHENOIDAL SINUSITIS: Status: ACTIVE | Noted: 2020-06-16

## 2021-02-20 PROBLEM — D58.2 ELEVATED HEMOGLOBIN (HCC): Status: RESOLVED | Noted: 2018-03-29 | Resolved: 2021-02-20

## 2021-02-20 PROBLEM — R42 VERTIGO: Status: ACTIVE | Noted: 2020-06-16

## 2021-02-20 PROBLEM — D10.6: Status: RESOLVED | Noted: 2020-06-16 | Resolved: 2021-02-20

## 2021-02-20 PROBLEM — M10.9 GOUT: Status: ACTIVE | Noted: 2017-11-29

## 2021-02-20 PROBLEM — J32.1 CHRONIC FRONTAL SINUSITIS: Status: ACTIVE | Noted: 2020-06-16

## 2021-02-20 PROBLEM — K21.9 ESOPHAGEAL REFLUX: Status: ACTIVE | Noted: 2017-11-29

## 2021-02-20 PROBLEM — R42 VERTIGO: Status: RESOLVED | Noted: 2020-06-16 | Resolved: 2021-02-20

## 2021-02-20 PROBLEM — J32.3 CHRONIC SPHENOIDAL SINUSITIS: Status: RESOLVED | Noted: 2020-06-16 | Resolved: 2021-02-20

## 2021-02-20 PROBLEM — C41.0: Status: ACTIVE | Noted: 2020-06-16

## 2021-02-20 PROCEDURE — 99215 OFFICE O/P EST HI 40 MIN: CPT | Performed by: FAMILY MEDICINE

## 2021-02-20 PROCEDURE — 3725F SCREEN DEPRESSION PERFORMED: CPT | Performed by: FAMILY MEDICINE

## 2021-02-20 NOTE — ASSESSMENT & PLAN NOTE
Lab Results   Component Value Date    EGFR 63 05/24/2020    CREATININE 1 47 (H) 05/24/2020    CREATININE 1 54 (H) 12/16/2017    CREATININE 1 69 (H) 07/19/2016   Check lab work  Patient with history of nephrectomy and has one kidney  Importance of regular follow up discussed with the patient

## 2021-02-20 NOTE — ASSESSMENT & PLAN NOTE
Patient's blood pressure is currently controlled with low sodium diet and increased exercise  He was previously taking Norvasc 10 mg, Bystolic 5 mg, Dyazide 66 6-59 mg and chlorthalidone 25 mg daily  The patient has been off medications for almost 1 year  He should continue to monitor his ambulatory blood pressure daily and keep journal of readings  If his blood pressure SBP >140 and DBP >85, he should follow up in the office to discuss restarting his medications  The importance of blood pressure control was discussed    Patient aware that uncontrolled BP can cause acute kidney failure, stroke and MI

## 2021-02-20 NOTE — PROGRESS NOTES
Assessment/Plan:    1  Ingrown toenail of both feet  Assessment & Plan:  Patient referred to podiatrist for assessment and nail care  Orders:  -     Ambulatory referral to Podiatry; Future    2  Hyperlipidemia, unspecified hyperlipidemia type  Assessment & Plan:  · Exercise: Stressed the importance of regular exercise  150 minutes of cardiovascular exercise per week encouraged   · Nutrition: Stressed importance of moderation in sodium/caffeine intake, saturated fat and cholesterol, caloric balance, sufficient intake of fresh fruits, vegetables, fiber   Check fasting blood work     Orders:  -     Lipid Panel with Direct LDL reflex; Future; Expected date: 02/20/2021    3  Stage 3a chronic kidney disease  Assessment & Plan:  Lab Results   Component Value Date    EGFR 63 05/24/2020    CREATININE 1 47 (H) 05/24/2020    CREATININE 1 54 (H) 12/16/2017    CREATININE 1 69 (H) 07/19/2016   Check lab work  Patient with history of nephrectomy and has one kidney  Importance of regular follow up discussed with the patient  Orders:  -     Comprehensive metabolic panel; Future; Expected date: 02/20/2021    4  Hypertension, unspecified type  Assessment & Plan:  Patient's blood pressure is currently controlled with low sodium diet and increased exercise  He was previously taking Norvasc 10 mg, Bystolic 5 mg, Dyazide 03 4-46 mg and chlorthalidone 25 mg daily  The patient has been off medications for almost 1 year  He should continue to monitor his ambulatory blood pressure daily and keep journal of readings  If his blood pressure SBP >140 and DBP >85, he should follow up in the office to discuss restarting his medications  The importance of blood pressure control was discussed  Patient aware that uncontrolled BP can cause acute kidney failure, stroke and MI  Orders:  -     CBC and differential; Future; Expected date: 02/20/2021  -     Comprehensive metabolic panel;  Future; Expected date: 02/20/2021  - TSH, 3rd generation; Future; Expected date: 02/20/2021      Subjective:      Patient ID: Paulette Hubbard is a 27 y o  male  HPI    Patient presenting to [facility] care and with chronic medical conditions  Patient has history of CKD, one kidney s/p nephrectomy, HTN, obesity, schwannoma which was resected, vertigo, HLD, and GERD  He was previously being managed at [facility]  After his schwannoma was removed, his symptoms of tinnitus and vertigo resolved  He also states that he completely changed his lifestyle after he was diagnosed with high blood pressure  He now runs daily and runs marathons  He eats a low sodium diet as well  He has a blood pressure machine at home and states that his BP normal ranges between 130/80s  He quit smoking and has been cleaning his diet  He stopped taking his blood pressure medications almost one year ago  He currently does not take any prescription medications  Patient is complaining of chronic ingrown and thick toe nails  He denies history of infection  He states that he has seen podiatrist in the past for management of his ingrown nails  He does not regularly cut his toe nails on his own  He denies pain or swelling of toes today  The following portions of the patient's history were reviewed and updated as appropriate: allergies, current medications, past family history, past medical history, past social history, past surgical history, and problem list       Current Outpatient Medications:     Apple Cider Vinegar 188 MG CAPS, Take by mouth, Disp: , Rfl:     esomeprazole (NexIUM) 20 mg capsule, Take 20 mg by mouth every morning before breakfast, Disp: , Rfl:     multivitamin (THERAGRAN) TABS, Take 1 tablet by mouth daily, Disp: , Rfl:       Review of Systems   Constitutional: Negative for appetite change, chills, fatigue and fever     HENT: Negative for congestion, ear discharge, ear pain, postnasal drip, rhinorrhea, sinus pressure, sinus pain, sneezing, sore throat and trouble swallowing  Eyes: Negative for pain, discharge, redness, itching and visual disturbance  Respiratory: Negative for apnea, cough, chest tightness, shortness of breath and wheezing  Cardiovascular: Negative for chest pain and leg swelling  Gastrointestinal: Negative for abdominal distention, abdominal pain, blood in stool, constipation, diarrhea, nausea and vomiting  Endocrine: Negative for polyuria  Genitourinary: Negative for decreased urine volume, difficulty urinating, discharge, dysuria, flank pain, frequency, hematuria, penile pain, penile swelling, scrotal swelling, testicular pain and urgency  Musculoskeletal: Negative for arthralgias, back pain, gait problem, joint swelling, myalgias, neck pain and neck stiffness  +ingrown toe nails   Skin: Negative for rash  Neurological: Negative for dizziness, weakness, light-headedness, numbness and headaches  Psychiatric/Behavioral: Negative for behavioral problems  The patient is not nervous/anxious  All other systems reviewed and are negative  Objective:      /82 (BP Location: Right arm)   Pulse (!) 110   Temp 98 7 °F (37 1 °C)   Ht 5' 5 5" (1 664 m)   Wt 86 2 kg (190 lb)   SpO2 97%   BMI 31 14 kg/m²          Physical Exam  Vitals signs and nursing note reviewed  Constitutional:       General: He is not in acute distress  Appearance: Normal appearance  He is not ill-appearing  HENT:      Head: Normocephalic and atraumatic  Right Ear: Ear canal and external ear normal  There is no impacted cerumen  Left Ear: Ear canal and external ear normal  There is no impacted cerumen  Nose: Nose normal       Mouth/Throat:      Mouth: Mucous membranes are moist       Pharynx: Oropharynx is clear  No oropharyngeal exudate  Eyes:      General: No scleral icterus  Extraocular Movements: Extraocular movements intact        Conjunctiva/sclera: Conjunctivae normal       Pupils: Pupils are equal, round, and reactive to light  Neck:      Musculoskeletal: Full passive range of motion without pain and normal range of motion  Thyroid: No thyromegaly  Cardiovascular:      Rate and Rhythm: Normal rate and regular rhythm  Heart sounds: Normal heart sounds  No murmur  Pulmonary:      Effort: Pulmonary effort is normal  No accessory muscle usage or respiratory distress  Breath sounds: Normal breath sounds and air entry  Abdominal:      General: Bowel sounds are normal  There is no distension  Palpations: Abdomen is soft  There is no mass  Tenderness: There is no abdominal tenderness  Musculoskeletal: Normal range of motion  Right lower leg: No edema  Left lower leg: No edema  Feet:      Right foot:      Skin integrity: Skin integrity normal       Toenail Condition: Right toenails are abnormally thick and ingrown  Left foot:      Skin integrity: Skin integrity normal       Toenail Condition: Left toenails are abnormally thick and ingrown  Comments: Big toe nails are ingrown   Thick nails on bilateral feet   Lymphadenopathy:      Cervical: No cervical adenopathy  Skin:     General: Skin is warm and dry  Neurological:      General: No focal deficit present  Mental Status: He is alert and oriented to person, place, and time  Psychiatric:         Mood and Affect: Mood normal          Behavior: Behavior normal          Thought Content:  Thought content normal          Judgment: Judgment normal

## 2021-02-20 NOTE — ASSESSMENT & PLAN NOTE
· Exercise: Stressed the importance of regular exercise       150 minutes of cardiovascular exercise per week encouraged   · Nutrition: Stressed importance of moderation in sodium/caffeine intake, saturated fat and cholesterol, caloric balance, sufficient intake of fresh fruits, vegetables, fiber   Check fasting blood work

## 2021-03-03 ENCOUNTER — OFFICE VISIT (OUTPATIENT)
Dept: PODIATRY | Facility: CLINIC | Age: 31
End: 2021-03-03
Payer: COMMERCIAL

## 2021-03-03 VITALS
BODY MASS INDEX: 31.31 KG/M2 | WEIGHT: 194.8 LBS | SYSTOLIC BLOOD PRESSURE: 144 MMHG | DIASTOLIC BLOOD PRESSURE: 78 MMHG | HEIGHT: 66 IN

## 2021-03-03 DIAGNOSIS — B35.1 ONYCHOMYCOSIS: ICD-10-CM

## 2021-03-03 DIAGNOSIS — M79.674 PAIN IN TOE OF RIGHT FOOT: ICD-10-CM

## 2021-03-03 DIAGNOSIS — L60.0 INGROWN TOENAIL: Primary | ICD-10-CM

## 2021-03-03 PROCEDURE — 11750 EXCISION NAIL&NAIL MATRIX: CPT | Performed by: PODIATRIST

## 2021-03-03 PROCEDURE — 1036F TOBACCO NON-USER: CPT | Performed by: PODIATRIST

## 2021-03-03 PROCEDURE — 99203 OFFICE O/P NEW LOW 30 MIN: CPT | Performed by: PODIATRIST

## 2021-03-03 RX ORDER — LIDOCAINE HYDROCHLORIDE AND EPINEPHRINE 10; 10 MG/ML; UG/ML
1 INJECTION, SOLUTION INFILTRATION; PERINEURAL ONCE
Status: COMPLETED | OUTPATIENT
Start: 2021-03-03 | End: 2021-03-03

## 2021-03-03 RX ORDER — LIDOCAINE HYDROCHLORIDE 10 MG/ML
1 INJECTION, SOLUTION EPIDURAL; INFILTRATION; INTRACAUDAL; PERINEURAL ONCE
Status: COMPLETED | OUTPATIENT
Start: 2021-03-03 | End: 2021-03-03

## 2021-03-03 RX ADMIN — LIDOCAINE HYDROCHLORIDE AND EPINEPHRINE 1 ML: 10; 10 INJECTION, SOLUTION INFILTRATION; PERINEURAL at 16:39

## 2021-03-03 RX ADMIN — LIDOCAINE HYDROCHLORIDE 1 ML: 10 INJECTION, SOLUTION EPIDURAL; INFILTRATION; INTRACAUDAL; PERINEURAL at 16:38

## 2021-03-03 NOTE — PROGRESS NOTES
Assessment/Plan:  Explained to patient that he is dealing with an ingrown toenail along the medial nail border of the right great toe  Discussed treatment options recommending partial matrixectomy  The goal of this procedure is permanent read occasion of the offending nail border  Procedure performed as follows: Anesthesia via 3 cc of a 1:1 mixture of 1 percent xylocaine with epinephrine and 1 percent xylocaine plain  Betadine prep was performed  The medial nail border of the right great toe was avulsed to the eponychium  A partial matrixectomy was performed utilizing phenol in a standard manner  A bacitracin dressing was applied  Patient is to soak in warm water twice a day tomorrow followed by a Neosporin dressing  Trimmed nail spicule left great toe  Discussed treatment options for onychomycosis  Patient is not a good candidate for oral Lamisil due to renal disorder  Patient is considering having all his toenails removed with matrixectomy  Explained that this is is a treatment option but not strongly recommended due to relative paucity of symptoms  The lesser toenails are not painful  No problem-specific Assessment & Plan notes found for this encounter  Diagnoses and all orders for this visit:    Ingrown toenail  -     lidocaine (PF) (XYLOCAINE-MPF) 1 % injection 1 mL  -     lidocaine-epinephrine (XYLOCAINE/EPINEPHRINE) 1 %-1:100,000 injection 1 mL    Onychomycosis    Pain in toe of right foot          Subjective:      Patient ID: Bianca Romo is a 27 y o  male  HPI     Patient presents with a painful ingrown toenail affecting the medial nail border of the right great toe  This nail has been a chronic problem for this patient addressed on at least 2 different occasions  Current pain has been present for approximately 2 months  Patient had ingrown nails on the left great toe treated surgically years back  An aberrant nail spicule remains along the medial nail border      Patient also notes multiple thickened yellow toenails secondary to onychomycosis  These toenails are not painful  Patient relates that he has 1 kidney but has not been a problem for him  Reviewed comprehensive metabolic panel of June 16, 2020  There is an elevated creatinine  Patient has a diagnosis of stage III renal disease  The following portions of the patient's history were reviewed and updated as appropriate: allergies, current medications, past family history, past medical history, past social history, past surgical history and problem list     Review of Systems   Constitutional: Negative  Gastrointestinal: Negative  Genitourinary:        Stage III renal disease   Musculoskeletal: Negative  Objective:      /78 (BP Location: Right arm)   Ht 5' 5 5" (1 664 m)   Wt 88 4 kg (194 lb 12 8 oz)   BMI 31 92 kg/m²          Physical Exam  Constitutional:       Appearance: Normal appearance  Cardiovascular:      Pulses: Normal pulses  Musculoskeletal: Normal range of motion  General: No tenderness  Skin:     Comments: Medial nail border right great toe painful with palpation  No evidence of paronychia  The right great toenail is narrow  Aberrant nail spicule noted medial nail border left great toe  All lesser toenails are thickened yellow with subungual debris  These toenails are not painful with pressure  Neurological:      General: No focal deficit present  Mental Status: He is oriented to person, place, and time  Nail removal    Date/Time: 3/3/2021 5:01 PM  Performed by: Chinyere Montes DPM  Authorized by: Chinyere Montes DPM     Patient location:  ClinicUniversal Protocol:  Consent: Verbal consent obtained    Risks and benefits: risks, benefits and alternatives were discussed  Consent given by: patient  Patient understanding: patient states understanding of the procedure being performed  Patient identity confirmed: verbally with patient      Location: Foot:  R big toe  Pre-procedure details:     Skin preparation:  Betadine  Anesthesia (see MAR for exact dosages):      Anesthesia method:  Nerve block    Block anesthetic:  Lidocaine 1% WITH epi and lidocaine 1% w/o epi    Block injection procedure:  Anatomic landmarks identified  Nail Removal:     Nail removed:  Partial    Nail side:  Medial    Nail bed sutured: no    Ingrown nail:     Wedge excision of skin: no      Nail matrix removed or ablated:  Partial

## 2021-03-10 ENCOUNTER — OFFICE VISIT (OUTPATIENT)
Dept: PODIATRY | Facility: CLINIC | Age: 31
End: 2021-03-10

## 2021-03-10 VITALS
HEIGHT: 66 IN | SYSTOLIC BLOOD PRESSURE: 126 MMHG | WEIGHT: 190.4 LBS | BODY MASS INDEX: 30.6 KG/M2 | DIASTOLIC BLOOD PRESSURE: 82 MMHG

## 2021-03-10 DIAGNOSIS — L60.0 INGROWN TOENAIL: Primary | ICD-10-CM

## 2021-03-10 PROCEDURE — 99024 POSTOP FOLLOW-UP VISIT: CPT | Performed by: PODIATRIST

## 2021-03-10 PROCEDURE — 3008F BODY MASS INDEX DOCD: CPT | Performed by: PODIATRIST

## 2021-05-24 NOTE — MISCELLANEOUS
Provider Comments  Provider Comments:   PATIENT WAS A NO SHOW FOR THE APPOINTMENT      Signatures   Electronically signed by : LJ Jackson ; Oct  6 2016  3:29PM EST                       (Author) [id number]

## 2021-08-03 ENCOUNTER — OFFICE VISIT (OUTPATIENT)
Dept: FAMILY MEDICINE CLINIC | Facility: CLINIC | Age: 31
End: 2021-08-03
Payer: COMMERCIAL

## 2021-08-03 VITALS
HEART RATE: 74 BPM | WEIGHT: 184 LBS | HEIGHT: 66 IN | TEMPERATURE: 98.4 F | SYSTOLIC BLOOD PRESSURE: 130 MMHG | OXYGEN SATURATION: 98 % | BODY MASS INDEX: 29.57 KG/M2 | DIASTOLIC BLOOD PRESSURE: 86 MMHG

## 2021-08-03 DIAGNOSIS — H93.12 TINNITUS, LEFT EAR: Primary | ICD-10-CM

## 2021-08-03 PROCEDURE — 3008F BODY MASS INDEX DOCD: CPT | Performed by: FAMILY MEDICINE

## 2021-08-03 PROCEDURE — 1036F TOBACCO NON-USER: CPT | Performed by: FAMILY MEDICINE

## 2021-08-03 PROCEDURE — 99214 OFFICE O/P EST MOD 30 MIN: CPT | Performed by: FAMILY MEDICINE

## 2021-08-03 RX ORDER — MUPIROCIN 100 %
POWDER (GRAM) MISCELLANEOUS
COMMUNITY

## 2021-08-03 NOTE — ASSESSMENT & PLAN NOTE
Patient with constant tinnitus in left ear for the past week and reported hearing loss in ear  MRI from 6/2021 was stable and showed no new findings  Due to his symptoms and history, I recommend the patient follow up with ENT as soon as possible for further evaluation  Patient verbalized understanding and will call to make appointment

## 2021-08-03 NOTE — PROGRESS NOTES
Assessment/Plan:    1  Tinnitus, left ear  Assessment & Plan:  Patient with constant tinnitus in left ear for the past week and reported hearing loss in ear  MRI from 6/2021 was stable and showed no new findings  Due to his symptoms and history, I recommend the patient follow up with ENT as soon as possible for further evaluation  Patient verbalized understanding and will call to make appointment  Subjective:      Patient ID: Mayur Mcallister is a 27 y o  male  HPI    Patient with history of neurofibroma  He is taking Mometasone nasal irrigation daily  He was also taking Mupirocin nasal irrigation but it was not refilled at his last ENT visit in June  After he developed the left ear ringing last week he restarted the mupirocin powder  The ringing has not improved  He follows with ENT regularly  Recent MRI 6/2021 shows stable findings with no new findings since his surgery last summer  He normally does not get ringing in his ears  He has not gotten tinnitus since before his surgery  He has some hearing loss in the left ear as well  The ringing has been constant but has been fluctuating  When exercising the ringing resolves  No ear pain, fever, sinus congestion, or post-nasal drip  No neurological deficits such as headache, dizziness, change in vision  All other ROS negative  The following portions of the patient's history were reviewed and updated as appropriate: allergies, current medications, past family history, past medical history, past social history, past surgical history, and problem list       Current Outpatient Medications:     Apple Cider Vinegar 188 MG CAPS, Take by mouth, Disp: , Rfl:     esomeprazole (NexIUM) 20 mg capsule, Take 20 mg by mouth every morning before breakfast, Disp: , Rfl:     multivitamin (THERAGRAN) TABS, Take 1 tablet by mouth daily, Disp: , Rfl:     Mupirocin POWD, Use Add content of one cap to 240 ml saline   Irrigate each nostril with 120 ml of medicated saline BID, Disp: , Rfl:     NON FORMULARY, Mometasone nasal irrigiation, Disp: , Rfl:       Review of Systems   Constitutional: Negative for chills and fever  HENT: Positive for hearing loss and tinnitus  Negative for congestion, ear pain, postnasal drip, rhinorrhea, sinus pain and sore throat  Eyes: Negative for pain and visual disturbance  Respiratory: Negative for cough and shortness of breath  Cardiovascular: Negative for chest pain and palpitations  Gastrointestinal: Negative for abdominal pain and vomiting  Genitourinary: Negative for dysuria and hematuria  Musculoskeletal: Negative for arthralgias and back pain  Skin: Negative for color change and rash  Neurological: Negative for dizziness, seizures, syncope, light-headedness and headaches  Psychiatric/Behavioral: Negative  All other systems reviewed and are negative  Objective:      /86 (BP Location: Left arm, Patient Position: Sitting, Cuff Size: Large)   Pulse 74   Temp 98 4 °F (36 9 °C) (Tympanic)   Ht 5' 5 5" (1 664 m)   Wt 83 5 kg (184 lb)   SpO2 98%   BMI 30 15 kg/m²          Physical Exam  Vitals and nursing note reviewed  Constitutional:       General: He is not in acute distress  Appearance: Normal appearance  He is not ill-appearing  HENT:      Head: Normocephalic and atraumatic  Right Ear: Tympanic membrane, ear canal and external ear normal  There is no impacted cerumen  Left Ear: Tympanic membrane, ear canal and external ear normal  There is no impacted cerumen  Nose: Nose normal  No congestion  Right Sinus: No maxillary sinus tenderness or frontal sinus tenderness  Left Sinus: No maxillary sinus tenderness or frontal sinus tenderness  Mouth/Throat:      Mouth: Mucous membranes are moist       Pharynx: Oropharynx is clear  No oropharyngeal exudate or posterior oropharyngeal erythema  Eyes:      General: No scleral icterus       Extraocular Movements: Extraocular movements intact  Conjunctiva/sclera: Conjunctivae normal       Pupils: Pupils are equal, round, and reactive to light  Neck:      Vascular: No carotid bruit  Cardiovascular:      Rate and Rhythm: Normal rate and regular rhythm  Heart sounds: Normal heart sounds  No murmur heard  Pulmonary:      Effort: Pulmonary effort is normal  No respiratory distress  Breath sounds: Normal breath sounds  No wheezing  Musculoskeletal:         General: Normal range of motion  Cervical back: Normal range of motion  Skin:     General: Skin is warm  Neurological:      General: No focal deficit present  Mental Status: He is alert and oriented to person, place, and time  Mental status is at baseline  Cranial Nerves: No cranial nerve deficit  Gait: Gait normal    Psychiatric:         Mood and Affect: Mood normal          Behavior: Behavior normal          Thought Content:  Thought content normal

## 2021-08-11 ENCOUNTER — OFFICE VISIT (OUTPATIENT)
Dept: FAMILY MEDICINE CLINIC | Facility: CLINIC | Age: 31
End: 2021-08-11
Payer: COMMERCIAL

## 2021-08-11 VITALS
DIASTOLIC BLOOD PRESSURE: 84 MMHG | BODY MASS INDEX: 29.02 KG/M2 | TEMPERATURE: 97.2 F | HEIGHT: 66 IN | OXYGEN SATURATION: 99 % | HEART RATE: 72 BPM | WEIGHT: 180.6 LBS | SYSTOLIC BLOOD PRESSURE: 130 MMHG

## 2021-08-11 DIAGNOSIS — H93.12 TINNITUS, LEFT EAR: ICD-10-CM

## 2021-08-11 DIAGNOSIS — Z13.220 SCREENING FOR LIPID DISORDERS: ICD-10-CM

## 2021-08-11 DIAGNOSIS — Z00.00 ANNUAL PHYSICAL EXAM: Primary | ICD-10-CM

## 2021-08-11 DIAGNOSIS — K21.00 GASTROESOPHAGEAL REFLUX DISEASE WITH ESOPHAGITIS WITHOUT HEMORRHAGE: ICD-10-CM

## 2021-08-11 DIAGNOSIS — Z11.4 SCREENING FOR HIV (HUMAN IMMUNODEFICIENCY VIRUS): ICD-10-CM

## 2021-08-11 DIAGNOSIS — Z11.59 ENCOUNTER FOR HEPATITIS C SCREENING TEST FOR LOW RISK PATIENT: ICD-10-CM

## 2021-08-11 PROCEDURE — 1036F TOBACCO NON-USER: CPT | Performed by: FAMILY MEDICINE

## 2021-08-11 PROCEDURE — 99395 PREV VISIT EST AGE 18-39: CPT | Performed by: FAMILY MEDICINE

## 2021-08-11 PROCEDURE — 3008F BODY MASS INDEX DOCD: CPT | Performed by: FAMILY MEDICINE

## 2021-08-11 NOTE — ASSESSMENT & PLAN NOTE
Patient taking PPI for several years and has been unable to wean himself off the medication despite diet changes  We discussed long term risks of taking PPI     Check H Pylori testing and consider referral to GI for EGD if symptoms persist

## 2021-08-11 NOTE — PATIENT INSTRUCTIONS
Weight Management   AMBULATORY CARE:   Why it is important to manage your weight:  Being overweight increases your risk of health conditions such as heart disease, high blood pressure, type 2 diabetes, and certain types of cancer  It can also increase your risk for osteoarthritis, sleep apnea, and other respiratory problems  Aim for a slow, steady weight loss  Even a small amount of weight loss can lower your risk of health problems  How to lose weight safely:  A safe and healthy way to lose weight is to eat fewer calories and get regular exercise  · You can lose up about 1 pound a week by decreasing the number of calories you eat by 500 calories each day  You can decrease calories by eating smaller portion sizes or by cutting out high-calorie foods  Read labels to find out how many calories are in the foods you eat  · You can also burn calories with exercise such as walking, swimming, or biking  You will be more likely to keep weight off if you make these changes part of your lifestyle  Exercise at least 30 minutes per day on most days of the week  You can also fit in more physical activity by taking the stairs instead of the elevator or parking farther away from stores  Ask your healthcare provider about the best exercise plan for you  Healthy meal plan for weight management:  A healthy meal plan includes a variety of foods, contains fewer calories, and helps you stay healthy  A healthy meal plan includes the following:     · Eat whole-grain foods more often  A healthy meal plan should contain fiber  Fiber is the part of grains, fruits, and vegetables that is not broken down by your body  Whole-grain foods are healthy and provide extra fiber in your diet  Some examples of whole-grain foods are whole-wheat breads and pastas, oatmeal, brown rice, and bulgur  · Eat a variety of vegetables every day  Include dark, leafy greens such as spinach, kale, nancie greens, and mustard greens   Eat yellow and orange vegetables such as carrots, sweet potatoes, and winter squash  · Eat a variety of fruits every day  Choose fresh or canned fruit (canned in its own juice or light syrup) instead of juice  Fruit juice has very little or no fiber  · Eat low-fat dairy foods  Drink fat-free (skim) milk or 1% milk  Eat fat-free yogurt and low-fat cottage cheese  Try low-fat cheeses such as mozzarella and other reduced-fat cheeses  · Choose meat and other protein foods that are low in fat  Choose beans or other legumes such as split peas or lentils  Choose fish, skinless poultry (chicken or turkey), or lean cuts of red meat (beef or pork)  Before you cook meat or poultry, cut off any visible fat  · Use less fat and oil  Try baking foods instead of frying them  Add less fat, such as margarine, sour cream, regular salad dressing and mayonnaise to foods  Eat fewer high-fat foods  Some examples of high-fat foods include french fries, doughnuts, ice cream, and cakes  · Eat fewer sweets  Limit foods and drinks that are high in sugar  This includes candy, cookies, regular soda, and sweetened drinks  Ways to decrease calories:   · Eat smaller portions  ? Use a small plate with smaller servings  ? Do not eat second helpings  ? When you eat at a restaurant, ask for a box and place half of your meal in the box before you eat  ? Share an entrée with someone else  · Replace high-calorie snacks with healthy, low-calorie snacks  ? Choose fresh fruit, vegetables, fat-free rice cakes, or air-popped popcorn instead of potato chips, nuts, or chocolate  ? Choose water or calorie-free drinks instead of soda or sweetened drinks  · Do not shop for groceries when you are hungry  You may be more likely to make unhealthy food choices  Take a grocery list of healthy foods and shop after you have eaten  · Eat regular meals  Do not skip meals  Skipping meals can lead to overeating later in the day   This can make it harder for you to lose weight  Eat a healthy snack in place of a meal if you do not have time to eat a regular meal  Talk with a dietitian to help you create a meal plan and schedule that is right for you  Other things to consider as you try to lose weight:   · Be aware of situations that may give you the urge to overeat, such as eating while watching television  Find ways to avoid these situations  For example, read a book, go for a walk, or do crafts  · Meet with a weight loss support group or friends who are also trying to lose weight  This may help you stay motivated to continue working on your weight loss goals  © Copyright [employer] 2021 Information is for End User's use only and may not be sold, redistributed or otherwise used for commercial purposes  All illustrations and images included in CareNotes® are the copyrighted property of A [employer]  or [employer]   The above information is an  only  It is not intended as medical advice for individual conditions or treatments  Talk to your doctor, nurse or pharmacist before following any medical regimen to see if it is safe and effective for you

## 2021-08-11 NOTE — ASSESSMENT & PLAN NOTE
Tinnitus has improved but is still present  If symptoms do not resolve within the next week, he should follow up with his ENT for evaluation  Physical exam is stable

## 2021-08-13 ENCOUNTER — APPOINTMENT (OUTPATIENT)
Dept: LAB | Facility: CLINIC | Age: 31
End: 2021-08-13
Payer: COMMERCIAL

## 2021-08-13 DIAGNOSIS — Z00.00 ANNUAL PHYSICAL EXAM: ICD-10-CM

## 2021-08-13 DIAGNOSIS — K21.00 GASTROESOPHAGEAL REFLUX DISEASE WITH ESOPHAGITIS WITHOUT HEMORRHAGE: ICD-10-CM

## 2021-08-13 DIAGNOSIS — Z11.59 ENCOUNTER FOR HEPATITIS C SCREENING TEST FOR LOW RISK PATIENT: ICD-10-CM

## 2021-08-13 DIAGNOSIS — Z11.4 SCREENING FOR HIV (HUMAN IMMUNODEFICIENCY VIRUS): ICD-10-CM

## 2021-08-13 DIAGNOSIS — Z13.220 SCREENING FOR LIPID DISORDERS: ICD-10-CM

## 2021-08-13 LAB
ALBUMIN SERPL BCP-MCNC: 3.6 G/DL (ref 3.5–5)
ALP SERPL-CCNC: 66 U/L (ref 46–116)
ALT SERPL W P-5'-P-CCNC: 43 U/L (ref 12–78)
ANION GAP SERPL CALCULATED.3IONS-SCNC: 4 MMOL/L (ref 4–13)
AST SERPL W P-5'-P-CCNC: 28 U/L (ref 5–45)
BASOPHILS # BLD AUTO: 0.01 THOUSANDS/ΜL (ref 0–0.1)
BASOPHILS NFR BLD AUTO: 0 % (ref 0–1)
BILIRUB SERPL-MCNC: 0.5 MG/DL (ref 0.2–1)
BUN SERPL-MCNC: 16 MG/DL (ref 5–25)
CALCIUM SERPL-MCNC: 9.1 MG/DL (ref 8.3–10.1)
CHLORIDE SERPL-SCNC: 105 MMOL/L (ref 100–108)
CHOLEST SERPL-MCNC: 253 MG/DL (ref 50–200)
CO2 SERPL-SCNC: 28 MMOL/L (ref 21–32)
CREAT SERPL-MCNC: 1.37 MG/DL (ref 0.6–1.3)
EOSINOPHIL # BLD AUTO: 0.08 THOUSAND/ΜL (ref 0–0.61)
EOSINOPHIL NFR BLD AUTO: 2 % (ref 0–6)
ERYTHROCYTE [DISTWIDTH] IN BLOOD BY AUTOMATED COUNT: 11.6 % (ref 11.6–15.1)
GFR SERPL CREATININE-BSD FRML MDRD: 68 ML/MIN/1.73SQ M
GLUCOSE P FAST SERPL-MCNC: 87 MG/DL (ref 65–99)
HCT VFR BLD AUTO: 46.7 % (ref 36.5–49.3)
HCV AB SER QL: NORMAL
HDLC SERPL-MCNC: 44 MG/DL
HGB BLD-MCNC: 16.1 G/DL (ref 12–17)
IMM GRANULOCYTES # BLD AUTO: 0 THOUSAND/UL (ref 0–0.2)
IMM GRANULOCYTES NFR BLD AUTO: 0 % (ref 0–2)
LDLC SERPL CALC-MCNC: 178 MG/DL (ref 0–100)
LYMPHOCYTES # BLD AUTO: 1.55 THOUSANDS/ΜL (ref 0.6–4.47)
LYMPHOCYTES NFR BLD AUTO: 28 % (ref 14–44)
MCH RBC QN AUTO: 30.1 PG (ref 26.8–34.3)
MCHC RBC AUTO-ENTMCNC: 34.5 G/DL (ref 31.4–37.4)
MCV RBC AUTO: 87 FL (ref 82–98)
MONOCYTES # BLD AUTO: 0.33 THOUSAND/ΜL (ref 0.17–1.22)
MONOCYTES NFR BLD AUTO: 6 % (ref 4–12)
NEUTROPHILS # BLD AUTO: 3.53 THOUSANDS/ΜL (ref 1.85–7.62)
NEUTS SEG NFR BLD AUTO: 64 % (ref 43–75)
NRBC BLD AUTO-RTO: 0 /100 WBCS
PLATELET # BLD AUTO: 196 THOUSANDS/UL (ref 149–390)
PMV BLD AUTO: 11.1 FL (ref 8.9–12.7)
POTASSIUM SERPL-SCNC: 4.5 MMOL/L (ref 3.5–5.3)
PROT SERPL-MCNC: 7.3 G/DL (ref 6.4–8.2)
RBC # BLD AUTO: 5.35 MILLION/UL (ref 3.88–5.62)
SODIUM SERPL-SCNC: 137 MMOL/L (ref 136–145)
TRIGL SERPL-MCNC: 153 MG/DL
TSH SERPL DL<=0.05 MIU/L-ACNC: 0.9 UIU/ML (ref 0.36–3.74)
WBC # BLD AUTO: 5.5 THOUSAND/UL (ref 4.31–10.16)

## 2021-08-13 PROCEDURE — 36415 COLL VENOUS BLD VENIPUNCTURE: CPT

## 2021-08-13 PROCEDURE — 86677 HELICOBACTER PYLORI ANTIBODY: CPT

## 2021-08-13 PROCEDURE — 84443 ASSAY THYROID STIM HORMONE: CPT

## 2021-08-13 PROCEDURE — 85025 COMPLETE CBC W/AUTO DIFF WBC: CPT

## 2021-08-13 PROCEDURE — 80053 COMPREHEN METABOLIC PANEL: CPT

## 2021-08-13 PROCEDURE — 87389 HIV-1 AG W/HIV-1&-2 AB AG IA: CPT

## 2021-08-13 PROCEDURE — 86803 HEPATITIS C AB TEST: CPT

## 2021-08-13 PROCEDURE — 80061 LIPID PANEL: CPT

## 2021-08-14 LAB
H PYLORI IGG SER IA-ACNC: 0.87 INDEX VALUE (ref 0–0.79)
H PYLORI IGM SER-ACNC: <9 UNITS (ref 0–8.9)

## 2021-08-15 LAB — HIV 1+2 AB+HIV1 P24 AG SERPL QL IA: NORMAL

## 2021-10-05 ENCOUNTER — CONSULT (OUTPATIENT)
Dept: GASTROENTEROLOGY | Facility: CLINIC | Age: 31
End: 2021-10-05
Payer: COMMERCIAL

## 2021-10-05 ENCOUNTER — APPOINTMENT (OUTPATIENT)
Dept: LAB | Facility: MEDICAL CENTER | Age: 31
End: 2021-10-05
Payer: COMMERCIAL

## 2021-10-05 VITALS
HEART RATE: 97 BPM | HEIGHT: 65 IN | DIASTOLIC BLOOD PRESSURE: 80 MMHG | BODY MASS INDEX: 28.42 KG/M2 | TEMPERATURE: 98.6 F | SYSTOLIC BLOOD PRESSURE: 120 MMHG | WEIGHT: 170.6 LBS

## 2021-10-05 DIAGNOSIS — R76.8 HELICOBACTER PYLORI ANTIBODY POSITIVE: ICD-10-CM

## 2021-10-05 DIAGNOSIS — K21.9 GASTROESOPHAGEAL REFLUX DISEASE, UNSPECIFIED WHETHER ESOPHAGITIS PRESENT: Primary | ICD-10-CM

## 2021-10-05 DIAGNOSIS — H81.02 MENIERE'S DISEASE (COCHLEAR HYDROPS), LEFT: ICD-10-CM

## 2021-10-05 PROCEDURE — 86038 ANTINUCLEAR ANTIBODIES: CPT

## 2021-10-05 PROCEDURE — 1036F TOBACCO NON-USER: CPT | Performed by: PHYSICIAN ASSISTANT

## 2021-10-05 PROCEDURE — 36415 COLL VENOUS BLD VENIPUNCTURE: CPT

## 2021-10-05 PROCEDURE — 86618 LYME DISEASE ANTIBODY: CPT

## 2021-10-05 PROCEDURE — 99244 OFF/OP CNSLTJ NEW/EST MOD 40: CPT | Performed by: PHYSICIAN ASSISTANT

## 2021-10-05 PROCEDURE — 86430 RHEUMATOID FACTOR TEST QUAL: CPT

## 2021-10-05 PROCEDURE — 86780 TREPONEMA PALLIDUM: CPT

## 2021-10-05 PROCEDURE — 86039 ANTINUCLEAR ANTIBODIES (ANA): CPT

## 2021-10-05 PROCEDURE — 3008F BODY MASS INDEX DOCD: CPT | Performed by: PHYSICIAN ASSISTANT

## 2021-10-06 LAB
ANA HOMOGEN SER QL IF: NORMAL
ANA HOMOGEN TITR SER: NORMAL {TITER}
B BURGDOR IGG+IGM SER-ACNC: 25
RHEUMATOID FACT SER QL LA: NEGATIVE
RYE IGE QN: POSITIVE
T PALLIDUM AB SER QL IF: NON REACTIVE

## 2021-10-19 ENCOUNTER — TELEPHONE (OUTPATIENT)
Dept: RHEUMATOLOGY | Facility: CLINIC | Age: 31
End: 2021-10-19

## 2021-11-17 ENCOUNTER — OFFICE VISIT (OUTPATIENT)
Dept: FAMILY MEDICINE CLINIC | Facility: CLINIC | Age: 31
End: 2021-11-17
Payer: COMMERCIAL

## 2021-11-17 VITALS
DIASTOLIC BLOOD PRESSURE: 82 MMHG | BODY MASS INDEX: 28.96 KG/M2 | SYSTOLIC BLOOD PRESSURE: 118 MMHG | HEIGHT: 65 IN | OXYGEN SATURATION: 98 % | TEMPERATURE: 98 F | WEIGHT: 173.8 LBS | HEART RATE: 86 BPM

## 2021-11-17 DIAGNOSIS — E55.9 VITAMIN D DEFICIENCY: ICD-10-CM

## 2021-11-17 DIAGNOSIS — H81.09 MENIERE'S DISEASE, UNSPECIFIED LATERALITY: Primary | ICD-10-CM

## 2021-11-17 DIAGNOSIS — E53.1 VITAMIN B6 DEFICIENCY: ICD-10-CM

## 2021-11-17 DIAGNOSIS — E53.9 VITAMIN B DEFICIENCY: ICD-10-CM

## 2021-11-17 DIAGNOSIS — E53.8 VITAMIN B12 DEFICIENCY: ICD-10-CM

## 2021-11-17 PROCEDURE — 1036F TOBACCO NON-USER: CPT | Performed by: FAMILY MEDICINE

## 2021-11-17 PROCEDURE — 3008F BODY MASS INDEX DOCD: CPT | Performed by: FAMILY MEDICINE

## 2021-11-17 PROCEDURE — 99214 OFFICE O/P EST MOD 30 MIN: CPT | Performed by: FAMILY MEDICINE

## 2021-11-18 PROBLEM — H81.09 MENIERE'S DISEASE: Status: ACTIVE | Noted: 2021-11-18

## 2021-11-30 ENCOUNTER — APPOINTMENT (OUTPATIENT)
Dept: RADIOLOGY | Facility: CLINIC | Age: 31
End: 2021-11-30
Payer: COMMERCIAL

## 2021-11-30 ENCOUNTER — OFFICE VISIT (OUTPATIENT)
Dept: URGENT CARE | Facility: CLINIC | Age: 31
End: 2021-11-30
Payer: COMMERCIAL

## 2021-11-30 VITALS
HEART RATE: 80 BPM | OXYGEN SATURATION: 98 % | DIASTOLIC BLOOD PRESSURE: 80 MMHG | SYSTOLIC BLOOD PRESSURE: 132 MMHG | RESPIRATION RATE: 18 BRPM | TEMPERATURE: 97.8 F

## 2021-11-30 DIAGNOSIS — R10.9 ABDOMINAL PAIN, UNSPECIFIED ABDOMINAL LOCATION: Primary | ICD-10-CM

## 2021-11-30 DIAGNOSIS — R11.2 NAUSEA AND VOMITING, INTRACTABILITY OF VOMITING NOT SPECIFIED, UNSPECIFIED VOMITING TYPE: ICD-10-CM

## 2021-11-30 DIAGNOSIS — R10.9 ABDOMINAL PAIN, UNSPECIFIED ABDOMINAL LOCATION: ICD-10-CM

## 2021-11-30 PROCEDURE — G0382 LEV 3 HOSP TYPE B ED VISIT: HCPCS | Performed by: PHYSICIAN ASSISTANT

## 2021-11-30 PROCEDURE — 74022 RADEX COMPL AQT ABD SERIES: CPT

## 2021-12-06 ENCOUNTER — TELEPHONE (OUTPATIENT)
Dept: GASTROENTEROLOGY | Facility: CLINIC | Age: 31
End: 2021-12-06

## 2021-12-07 ENCOUNTER — EVALUATION (OUTPATIENT)
Dept: PHYSICAL THERAPY | Facility: CLINIC | Age: 31
End: 2021-12-07
Payer: COMMERCIAL

## 2021-12-07 VITALS — HEART RATE: 62 BPM | SYSTOLIC BLOOD PRESSURE: 134 MMHG | DIASTOLIC BLOOD PRESSURE: 89 MMHG

## 2021-12-07 DIAGNOSIS — H81.09 ACTIVE COCHLEOVESTIBULAR MENIERE'S DISEASE, UNSPECIFIED LATERALITY: Primary | ICD-10-CM

## 2021-12-07 PROCEDURE — 97112 NEUROMUSCULAR REEDUCATION: CPT

## 2021-12-07 PROCEDURE — 97162 PT EVAL MOD COMPLEX 30 MIN: CPT

## 2021-12-09 ENCOUNTER — TELEPHONE (OUTPATIENT)
Dept: GASTROENTEROLOGY | Facility: MEDICAL CENTER | Age: 31
End: 2021-12-09

## 2021-12-14 ENCOUNTER — OFFICE VISIT (OUTPATIENT)
Dept: PHYSICAL THERAPY | Facility: CLINIC | Age: 31
End: 2021-12-14
Payer: COMMERCIAL

## 2021-12-14 DIAGNOSIS — H81.09 ACTIVE COCHLEOVESTIBULAR MENIERE'S DISEASE, UNSPECIFIED LATERALITY: Primary | ICD-10-CM

## 2021-12-14 PROCEDURE — 97112 NEUROMUSCULAR REEDUCATION: CPT

## 2021-12-21 ENCOUNTER — APPOINTMENT (OUTPATIENT)
Dept: PHYSICAL THERAPY | Facility: CLINIC | Age: 31
End: 2021-12-21
Payer: COMMERCIAL

## 2021-12-28 ENCOUNTER — APPOINTMENT (OUTPATIENT)
Dept: PHYSICAL THERAPY | Facility: CLINIC | Age: 31
End: 2021-12-28
Payer: COMMERCIAL

## 2022-01-07 ENCOUNTER — OFFICE VISIT (OUTPATIENT)
Dept: PHYSICAL THERAPY | Facility: CLINIC | Age: 32
End: 2022-01-07
Payer: COMMERCIAL

## 2022-01-07 DIAGNOSIS — H81.09 ACTIVE COCHLEOVESTIBULAR MENIERE'S DISEASE, UNSPECIFIED LATERALITY: Primary | ICD-10-CM

## 2022-01-07 PROCEDURE — 97112 NEUROMUSCULAR REEDUCATION: CPT

## 2022-01-07 NOTE — PROGRESS NOTES
Daily Note     Today's date: 2022  Patient name: Luis Parkinson  : 1990  MRN: [id number]  Referring provider: Joao Torres DO  Dx:   Encounter Diagnosis     ICD-10-CM    1  Active cochleovestibular Meniere's disease, unspecified laterality  H81 [id number]                   Subjective: pt reports he has been feeling better in past week-less tinnitus as well  Has found that colder weather improves his overall sx  He also cont to stay the course with the diet, exercises changes he made to combat this issue  Feels the cerv/scap exer have helped with neck stiffness and     Objective: See treatment diary below      Assessment: pt demonstrates good understanding of gaze stabilization and balance exer as it relates/applies to his Meniere's diagnosis  Discussed that the gaze stabilization could be implemented after a Meniere's exacerbation when pt is recovering from vestibular setback/hypofunction brought on by the exacerbation  Balance exercises can be performed on an ongoing basis currently along with his regular fitness regime  Patient to cont indep with education and exercises learned through PT   D/c from formal PT  Plan: Continue per plan of care  Precautions: Meniere's disease with h/o drop attacks       21          Manuals #1 #2 #3  FOTO     Re-Eval   cerv - -                                                  Neuro Re-Ed             -Forward T pose  -Triangle Pose - - With head nods  30" ea exer /ea side                       -Alt foot steps forward EO/EC  -Alt foot steps backward EO/EC  x10 each           Vestibular/Meniere educ/Safety  performed - AE          Dynamic steps w   Trunk/arm rotation (for/back) - - performed 2 lengths          VOR x 1 hor/vert  -stand WBOS/NBOS  -foam   - 60' each sitting walk F/b/  NBOS on foam x60" hor and vert ea variable          Gaze shift hor/vert  -stand - - 60" stand          Ball circles:   -floor EO/EC    -foam EO - x10 each - Ball raise foam EO/EC  Tandem/NBOS   -up/down  -diag - x10 -          Tandem stance:  -ball raise up/down   -HM hor/vert EO/EC  -walk  R and L x 10      -    - -          GT HM:   -for/back - performed           BOSU  -static  -lat step up to unistand-alt sides  -           -180/360 deg turns - - performed 30"          Lunge position  - B arm raise  -trunk Rot R/L - -           Ther Ex             Cerv AROM:    -ret    -SB    -Rot     -SB w   Rot -   X 3-5 ea x3-5 ea          scap ret/shrugs - x10 x10                                                                                        Ther Activity                                       Gait Training                                       Modalities

## 2022-02-14 ENCOUNTER — APPOINTMENT (OUTPATIENT)
Dept: LAB | Facility: HOSPITAL | Age: 32
End: 2022-02-14
Attending: FAMILY MEDICINE
Payer: COMMERCIAL

## 2022-02-14 ENCOUNTER — OFFICE VISIT (OUTPATIENT)
Dept: RHEUMATOLOGY | Facility: CLINIC | Age: 32
End: 2022-02-14
Payer: COMMERCIAL

## 2022-02-14 VITALS
DIASTOLIC BLOOD PRESSURE: 68 MMHG | SYSTOLIC BLOOD PRESSURE: 122 MMHG | WEIGHT: 177.8 LBS | HEIGHT: 65 IN | BODY MASS INDEX: 29.62 KG/M2

## 2022-02-14 DIAGNOSIS — E53.8 VITAMIN B12 DEFICIENCY: ICD-10-CM

## 2022-02-14 DIAGNOSIS — M1A.9XX0 CHRONIC GOUT WITHOUT TOPHUS, UNSPECIFIED CAUSE, UNSPECIFIED SITE: ICD-10-CM

## 2022-02-14 DIAGNOSIS — E53.9 VITAMIN B DEFICIENCY: ICD-10-CM

## 2022-02-14 DIAGNOSIS — H81.09 MENIERE'S DISEASE, UNSPECIFIED LATERALITY: ICD-10-CM

## 2022-02-14 DIAGNOSIS — E53.1 VITAMIN B6 DEFICIENCY: ICD-10-CM

## 2022-02-14 DIAGNOSIS — M1A.9XX0 CHRONIC GOUT WITHOUT TOPHUS, UNSPECIFIED CAUSE, UNSPECIFIED SITE: Primary | ICD-10-CM

## 2022-02-14 DIAGNOSIS — E55.9 VITAMIN D DEFICIENCY: ICD-10-CM

## 2022-02-14 LAB
25(OH)D3 SERPL-MCNC: 28.6 NG/ML (ref 30–100)
CRP SERPL QL: 4.3 MG/L
TSH SERPL DL<=0.05 MIU/L-ACNC: 1.12 UIU/ML (ref 0.36–3.74)
URATE SERPL-MCNC: 9.3 MG/DL (ref 4.2–8)
VIT B12 SERPL-MCNC: 400 PG/ML (ref 100–900)

## 2022-02-14 PROCEDURE — 36415 COLL VENOUS BLD VENIPUNCTURE: CPT

## 2022-02-14 PROCEDURE — 84207 ASSAY OF VITAMIN B-6: CPT

## 2022-02-14 PROCEDURE — 84443 ASSAY THYROID STIM HORMONE: CPT

## 2022-02-14 PROCEDURE — 84550 ASSAY OF BLOOD/URIC ACID: CPT

## 2022-02-14 PROCEDURE — 86235 NUCLEAR ANTIGEN ANTIBODY: CPT

## 2022-02-14 PROCEDURE — 3008F BODY MASS INDEX DOCD: CPT | Performed by: INTERNAL MEDICINE

## 2022-02-14 PROCEDURE — 82306 VITAMIN D 25 HYDROXY: CPT

## 2022-02-14 PROCEDURE — 99203 OFFICE O/P NEW LOW 30 MIN: CPT | Performed by: INTERNAL MEDICINE

## 2022-02-14 PROCEDURE — 82607 VITAMIN B-12: CPT

## 2022-02-14 PROCEDURE — 1036F TOBACCO NON-USER: CPT | Performed by: INTERNAL MEDICINE

## 2022-02-14 PROCEDURE — 86140 C-REACTIVE PROTEIN: CPT

## 2022-02-14 NOTE — PATIENT INSTRUCTIONS
Keep eating healthy and exercising  I ordered some non-fasting blood work for you to get done  You can try over the counter Aspercreme with lidocaine and apply it to your painful joints 2-3 times per day as needed

## 2022-02-14 NOTE — PROGRESS NOTES
Rheumatology Consult   Jacobo Mascorro 34 y o  male 1990    DATE: 2/14/2022    Reason for Consult: +DUSTIN    Assessment and Plan:  +DUSTIN, however, nothing to suggest a systemmic autoimmune disorder  Will check a CRP, RNP and centromere ab given GERD and dry eye  Patellofemoral syndrome--PT for quadriceps-strengthening  Topical analgesics PRN  History of gout--will check uric acid  F/u in 8 weeks to discuss lab results  Instructed to maintain a symptom log  History of Present Illness:  Jacobo Mascorro is a 34 y o  male Here for initial visit for a +DUSTIN  He has a history of Menier's disease  Intermittent knee pain with activity  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   No constitutional symptoms  No SLE-like symptoms  He has a history of gout  Mother with arthritis  Review of Systems  Review of Systems   Constitutional: Negative for fatigue, fever and unexpected weight change  HENT: Negative for mouth sores  Respiratory: Negative for cough and shortness of breath  Cardiovascular: Negative for chest pain and leg swelling  Gastrointestinal: Negative for abdominal pain, constipation and diarrhea  Musculoskeletal: Positive for arthralgias  Negative for back pain, joint swelling and myalgias  Skin: Negative for color change and rash  Neurological: Negative for weakness  Hematological: Negative for adenopathy  Psychiatric/Behavioral: Negative for sleep disturbance  Allergies  No Known Allergies    Current Medications      Past Medical History  Past Medical History:   Diagnosis Date    Hypertension     Vertigo        Past Surgical History  Past Surgical History:   Procedure Laterality Date    HERNIA REPAIR      NASAL SINUS SURGERY Left 06/2020    NEPHRECTOMY      NEPHRECTOMY Right        Family History  No known autoimmune or inflammatory diseases in the family     Family History   Problem Relation Age of Onset    Hypertension Mother     Heart disease Father cardiac pacemaker    Hypertension Father        Social History  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  Social History     Substance and Sexual Activity   Alcohol Use Yes    Comment: social      Social History     Substance and Sexual Activity   Drug Use Yes    Types: Marijuana     Social History     Tobacco Use   Smoking Status Former Smoker    Packs/day: 0 50    Types: Cigarettes    Quit date: 09/2018    Years since quitting: 3 4   Smokeless Tobacco Never Used        Objective:  /68   Ht 5' 5" (1 651 m)   Wt 80 6 kg (177 lb 12 8 oz)   BMI 29 59 kg/m²     Physical Exam  Constitutional:       General: He is not in acute distress  HENT:      Head: Normocephalic and atraumatic  Eyes:      Conjunctiva/sclera: Conjunctivae normal    Cardiovascular:      Rate and Rhythm: Normal rate and regular rhythm  Heart sounds: S1 normal and S2 normal  No friction rub  Pulmonary:      Effort: Pulmonary effort is normal  No respiratory distress  Breath sounds: Normal breath sounds  No wheezing, rhonchi or rales  Musculoskeletal:      Cervical back: Neck supple  Right knee: Crepitus present  Left knee: Crepitus present  Skin:     General: Skin is warm and dry  Coloration: Skin is not pale  Findings: No rash  Neurological:      Mental Status: He is alert  Mental status is at baseline  Psychiatric:         Mood and Affect: Mood normal          Behavior: Behavior normal             Lab Results: I have personally reviewed pertinent reports        CBC:   , Chemistry Profile:       Invalid input(s): ALBUMIN, Coagulation Studies:   , Cardiac Studies:   , Additional Labs:   , iSTAT CHEM 8:       Invalid input(s): POTASSIUMIS, ABG:   , Toxicology:   , Last A1C/Lipid Panel/Thyroid Panel:   Lab Results   Component Value Date    TRIG 153 (H) 08/13/2021    TRIG 253 (H) 12/16/2017    CHOL 280 (H) 12/16/2017    CHOL 218 (H) 03/18/2016    HDL 44 08/13/2021    HDL 35 (L) 12/16/2017    1811 SAIC 178 (H) 08/13/2021    [id number] 0 896 08/13/2021     Lab Results   Component Value Date    DUSTIN Positive (A) 10/05/2021    RF Negative 10/05/2021    HEPCAB Non-reactive 08/13/2021           Invalid input(s): URIBILINOGEN         Imaging: I have personally reviewed pertinent films in PACS

## 2022-02-15 LAB
CENTROMERE B AB SER-ACNC: >8 AI (ref 0–0.9)
ENA RNP AB SER-ACNC: 0.2 AI (ref 0–0.9)
ENA SM AB SER-ACNC: <0.2 AI (ref 0–0.9)
ENA SS-A AB SER-ACNC: <0.2 AI (ref 0–0.9)
ENA SS-B AB SER-ACNC: <0.2 AI (ref 0–0.9)

## 2022-02-23 LAB — VIT B6 SERPL-MCNC: 70.5 UG/L (ref 5.3–46.7)

## 2022-02-28 ENCOUNTER — APPOINTMENT (OUTPATIENT)
Dept: LAB | Facility: CLINIC | Age: 32
End: 2022-02-28
Payer: COMMERCIAL

## 2022-02-28 DIAGNOSIS — E53.9 VITAMIN B DEFICIENCY: ICD-10-CM

## 2022-02-28 PROCEDURE — 36415 COLL VENOUS BLD VENIPUNCTURE: CPT

## 2022-02-28 PROCEDURE — 84591 ASSAY OF NOS VITAMIN: CPT

## 2022-04-07 ENCOUNTER — TELEPHONE (OUTPATIENT)
Dept: RHEUMATOLOGY | Facility: CLINIC | Age: 32
End: 2022-04-07

## 2022-05-02 ENCOUNTER — PATIENT MESSAGE (OUTPATIENT)
Dept: FAMILY MEDICINE CLINIC | Facility: CLINIC | Age: 32
End: 2022-05-02

## 2022-05-02 ENCOUNTER — APPOINTMENT (OUTPATIENT)
Dept: LAB | Facility: MEDICAL CENTER | Age: 32
End: 2022-05-02
Payer: COMMERCIAL

## 2022-05-02 ENCOUNTER — TELEMEDICINE (OUTPATIENT)
Dept: FAMILY MEDICINE CLINIC | Facility: CLINIC | Age: 32
End: 2022-05-02
Payer: COMMERCIAL

## 2022-05-02 DIAGNOSIS — E87.1 HYPONATREMIA: ICD-10-CM

## 2022-05-02 DIAGNOSIS — U07.1 COVID-19: ICD-10-CM

## 2022-05-02 DIAGNOSIS — U07.1 COVID-19: Primary | ICD-10-CM

## 2022-05-02 DIAGNOSIS — I10 HYPERTENSION, UNSPECIFIED TYPE: ICD-10-CM

## 2022-05-02 DIAGNOSIS — N18.31 STAGE 3A CHRONIC KIDNEY DISEASE (HCC): ICD-10-CM

## 2022-05-02 LAB
ANION GAP SERPL CALCULATED.3IONS-SCNC: 3 MMOL/L (ref 4–13)
BUN SERPL-MCNC: 14 MG/DL (ref 5–25)
CALCIUM SERPL-MCNC: 9.2 MG/DL (ref 8.3–10.1)
CHLORIDE SERPL-SCNC: 99 MMOL/L (ref 100–108)
CO2 SERPL-SCNC: 32 MMOL/L (ref 21–32)
CREAT SERPL-MCNC: 1.62 MG/DL (ref 0.6–1.3)
GFR SERPL CREATININE-BSD FRML MDRD: 54 ML/MIN/1.73SQ M
GLUCOSE SERPL-MCNC: 83 MG/DL (ref 65–140)
POTASSIUM SERPL-SCNC: 3.5 MMOL/L (ref 3.5–5.3)
SODIUM SERPL-SCNC: 134 MMOL/L (ref 136–145)

## 2022-05-02 PROCEDURE — 80048 BASIC METABOLIC PNL TOTAL CA: CPT

## 2022-05-02 PROCEDURE — 36415 COLL VENOUS BLD VENIPUNCTURE: CPT

## 2022-05-02 PROCEDURE — 1036F TOBACCO NON-USER: CPT | Performed by: FAMILY MEDICINE

## 2022-05-02 PROCEDURE — 99214 OFFICE O/P EST MOD 30 MIN: CPT | Performed by: FAMILY MEDICINE

## 2022-05-02 NOTE — PROGRESS NOTES
COVID-19 Outpatient Progress Note    Assessment/Plan:    Problem List Items Addressed This Visit        Cardiovascular and Mediastinum    HTN (hypertension)       Genitourinary    CKD (chronic kidney disease), stage III    Relevant Orders    Basic metabolic panel       Other    COVID-19 - Primary     +home covid test; advised paxlovid given risk factors and pt unvaccinated; discussed EUA use and potential benefits as well as potential SE; needs updated GFR which he will get today and will f/u with dosing tomorrow; reviewed quarantine guidelines and f/u guidance should sx worsen              Disposition:     Patient has COVID-19 infection  Based off CDC guidelines, they were recommended to isolate for 5 days from the date of the positive test  If they remain asymptomatic, isolation may be ended followed by 5 days of wearing a mask when around othes to minimize risk of infecting others  If they have a fever, continue to stay home until fever resolves for at least 24 hours  Discussed symptom directed medication options with patient  Discussed vitamin D, vitamin C, and/or zinc supplementation with patient  Patient meets criteria for PAXLOVID and they have been counseled appropriately according to EUA documentation released by the FDA  After discussion, patient agrees to treatment  [place] is an investigational medicine used to treat mild-to-moderate COVID-19 in adults and children (15years of age and older weighing at least 80 pounds (40 kg)) with positive results of direct SARS-CoV-2 viral testing, and who are at high risk for progression to severe COVID-19, including hospitalization or death  PAXLOVID is investigational because it is still being studied  There is limited information about the safety and effectiveness of using PAXLOVID to treat people with mild-to-moderate COVID-19      The FDA has authorized the emergency use of PAXLOVID for the treatment of mild-tomoderate COVID-19 in adults and children (15years of age and older weighing at least 80 pounds (40 kg)) with a positive test for the virus that causes COVID-19, and who are at high risk for progression to severe COVID-19, including hospitalization or death, under an EUA  What should I tell my healthcare provider before I take PAXLOVID? Tell your healthcare provider if you:  - Have any allergies  - Have liver or kidney disease  - Are pregnant or plan to become pregnant  - Are breastfeeding a child  - Have any serious illnesses    Tell your healthcare provider about all the medicines you take, including prescription and over-the-counter medicines, vitamins, and herbal supplements  Some medicines may interact with PAXLOVID and may cause serious side effects  Keep a list of your medicines to show your healthcare provider and pharmacist when you get a new medicine  You can ask your healthcare provider or pharmacist for a list of medicines that interact with PAXLOVID  Do not start taking a new medicine without telling your healthcare provider  Your healthcare provider can tell you if it is safe to take PAXLOVID with other medicines  Tell your healthcare provider if you are taking combined hormonal contraceptive  PAXLOVID may affect how your birth control pills work  Females who are able to become pregnant should use another effective alternative form of contraception or an additional barrier method of contraception  Talk to your healthcare provider if you have any questions about contraceptive methods that might be right for you  How do I take PAXLOVID? PAXLOVID consists of 2 medicines: nirmatrelvir and ritonavir  - Take 2 pink tablets of nirmatrelvir with 1 white tablet of ritonavir by mouth 2 times each day (in the morning and in the evening) for 5 days  For each dose, take all 3 tablets at the same time  - If you have kidney disease, talk to your healthcare provider  You may need a different dose  - Swallow the tablets whole   Do not chew, break, or crush the tablets  - Take PAXLOVID with or without food  - Do not stop taking PAXLOVID without talking to your healthcare provider, even if you feel better  - If you miss a dose of PAXLOVID within 8 hours of the time it is usually taken, take it as soon as you remember  If you miss a dose by more than 8 hours, skip the missed dose and take the next dose at your regular time  Do not take 2 doses of PAXLOVID at the same time  - If you take too much PAXLOVID, call your healthcare provider or go to the nearest hospital emergency room right away  - If you are taking a ritonavir- or cobicistat-containing medicine to treat hepatitis C or Human Immunodeficiency Virus (HIV), you should continue to take your medicine as prescribed by your healthcare provider   - Talk to your healthcare provider if you do not feel better or if you feel worse after 5 days  Who should generally not take PAXLOVID? Do not take PAXLOVID if:  You are allergic to nirmatrelvir, ritonavir, or any of the ingredients in PAXLOVID  You are taking any of the following medicines:  - Alfuzosin  - Pethidine, piroxicam, propoxyphene  - Ranolazine  - Amiodarone, dronedarone, flecainide, propafenone, quinidine  - Colchicine  - Lurasidone, pimozide, clozapine  - Dihydroergotamine, ergotamine, methylergonovine  - Lovastatin, simvastatin  - Sildenafil (Revatio®) for pulmonary arterial hypertension (PAH)  - Triazolam, oral midazolam  - Apalutamide  - Carbamazepine, phenobarbital, phenytoin  - Rifampin  - St  Sukumars Wort (hypericum perforatum)    What are the important possible side effects of PAXLOVID? Possible side effects of PAXLOVID are:  - Liver Problems  Tell your healthcare provider right away if you have any of these signs and symptoms of liver problems: loss of appetite, yellowing of your skin and the whites of eyes (jaundice), dark-colored urine, pale colored stools and itchy skin, stomach area (abdominal) pain    - Resistance to HIV Medicines  If you have untreated HIV infection, PAXLOVID may lead to some HIV medicines not working as well in the future  - Other possible side effects include: altered sense of taste, diarrhea, high blood pressure, or muscle aches    These are not all the possible side effects of PAXLOVID  Not many people have taken PAXLOVID  Serious and unexpected side effects may happen  [address] is still being studied, so it is possible that all of the risks are not known at this time  What other treatment choices are there? Like [place] Vega may allow for the emergency use of other medicines to treat people with COVID-19  Go to https://[employer]/ for information on the emergency use of other medicines that are authorized by FDA to treat people with COVID-19  Your healthcare provider may talk with you about clinical trials for which you may be eligible  It is your choice to be treated or not to be treated with PAXLOVID  Should you decide not to receive it or for your child not to receive it, it will not change your standard medical care  What if I am pregnant or breastfeeding? There is no experience treating pregnant women or breastfeeding mothers with PAXLOVID  For a mother and unborn baby, the benefit of taking PAXLOVID may be greater than the risk from the treatment  If you are pregnant, discuss your options and specific situation with your healthcare provider  It is recommended that you use effective barrier contraception or do not have sexual activity while taking PAXLOVID  If you are breastfeeding, discuss your options and specific situation with your healthcare provider  How do I report side effects with PAXLOVID? Contact your healthcare provider if you have any side effects that bother you or do not go away      Report side effects to FDA MedWatch at www fda gov/medwatch or call 7-789-AFP2229 or you can report side effects to [facility]  at the contact information provided below  Website Fax number Telephone number   [employer] [phone]     How should I store Chuck Fabian? Store PAXLOVID tablets at room temperature between 68°F to 77°F (20°C to 25°C)  Full fact sheet for patients, parents, and caregivers can be found at: [employer]    I have spent 20 minutes directly with the patient  Greater than 50% of this time was spent in counseling/coordination of care regarding: diagnostic results, prognosis, risks and benefits of treatment options, instructions for management, patient and family education, importance of treatment compliance, risk factor reductions and impressions  Encounter provider Bg Woo DO    Provider located at [place] [address]  [address] [facility] [address] [place] [address]  [facility]  [phone]    Recent Visits  No visits were found meeting these conditions  Showing recent visits within past 7 days and meeting all other requirements  Today's Visits  Date Type Provider Dept   05/02/22 Telemedicine Bg Woo DO [place] [id number] [facility] Moiz today's visits and meeting all other requirements  Future Appointments  No visits were found meeting these conditions  Showing future appointments within next 150 days and meeting all other requirements     This virtual check-in was done via [employer] and patient was informed that this is a secure, HIPAA-compliant platform  He agrees to proceed  Patient agrees to participate in a virtual check in via telephone or video visit instead of presenting to the office to address urgent/immediate medical needs  Patient is aware this is a billable service  After connecting through [facility], the patient was identified by name and date of birth   Hector Wilkerson was informed that this was a telemedicine visit and that the exam was being conducted confidentially over secure lines  My office door was closed  No one else was in the room  Heraclio Espitia acknowledged consent and understanding of privacy and security of the telemedicine visit  I informed the patient that I have reviewed his record in Epic and presented the opportunity for him to ask any questions regarding the visit today  The patient agreed to participate  Verification of patient location:  Patient is located in the following state in which I hold an active license: [place]    Subjective:   Heraclio Espitia is a 34 y o  male who has been screened for COVID-19  Symptom change since last report: unchanged  Patient's symptoms include fever, fatigue, nasal congestion, sore throat, cough, myalgias and headache  Patient denies shortness of breath and chest tightness      - Date of symptom onset: 4/30/2022  - Date of exposure: 4/28/2022  - Date of positive COVID-19 test: 5/1/2022  Type of test: Home antigen  Patient with typical symptoms of COVID-19 and they attest that they were positive on home rapid antigen testing  Image of positive result is not able to be uploaded into their chart  COVID-19 vaccination status: Not vaccinated    Glendale Habermann has been staying home and has isolated themselves in his home  Taking care not to share personal items?: is not      Lab Results   Component Value Date    SARSCOV2 Not Detected 06/24/2020    SARSCOV2  06/24/2020     INTERPRETATION:  The SARS-CoV-2 virus associated with COVID-19 was NOT detected at the level of sensitivity of the assay in the submitted specimen   Since SARS-CoV-2 is an emerging pathogen, this test result should be carefully integrated with clinical observation, patient history, and epidemiological information (see clinical significance and assay limitations)  CLINICAL SIGNIFICANCE AND ASSAY LIMITATIONS:  This assay was developed in response to the SARS-CoV-2 public health emergency using readily available information and resources  This test is intended for individuals suspected to have COVID-19 by their health care provider  The assay detects nucleic acid from SARS-CoV-2 (see method)  It does not detect other common respiratory pathogens, including the common human coronavirus strains (229E, HKU1, NL63, OC43)  SARS-CoV-2 is generally detectable in respiratory specimens during infection, but may be affected by sample collection methods, patient factors (e g , presence of symptoms), and/or stage of infection  While a nasopharyngeal specimen is the preferred choice for swab-based testing, other upper respiratory specimen types are considered acceptable alternatives for SARS-CoV-2 testing based on guidance from the [facility] and FDA  However, the performance characteristics of mid-turbinate and nasal swab specimens have not been fully established for all methods, so their performance may differ from nasopharyngeal samples  A "Positive" result suggests an active infection with SARS-CoV-2 but does not rule out bacterial infection or co-infection with other viruses   A "Not Detected" result does not preclude SARS-CoV-2 infection and should not be used as the sole basis for treatment or other patient management decisions  METHOD:  The presence of SARS-CoV-2 nucleic acid was determined using the TaqPath COVID-19 Combo Kit assay [employer])   Internal controls were used to verify extraction, amplification, and detection of target sequences   The normal reference range is "Not detected"  ADDITIONAL INFORMATIONFor Healthcare Providers:  Patients: [employer] ee test has received Emergency Use Authorization (EUA) by the FDA   This test was performed by the [facility] Laboratory in the Department of Pathology and Laboratory Medicine in the [address] [place] [address] at the [address] [place] [address] [facility]   This test is used for clinical purposes   It should not be regarded as investigational or for research   This laboratory is certified under the Clinical Laboratory Improvement Amendments (CLIA) as qualified to perform high complexity clinical laboratory testing  Past Medical History:   Diagnosis Date    Hypertension     Vertigo      Past Surgical History:   Procedure Laterality Date    HERNIA REPAIR      NASAL SINUS SURGERY Left 06/2020    NEPHRECTOMY      NEPHRECTOMY Right      Current Outpatient Medications   Medication Sig Dispense Refill    Apple Cider Vinegar 188 MG CAPS Take by mouth      Cholecalciferol (Vitamin D3) 125 MCG (5000 UT) TABS Take 5,000 Units by mouth daily      esomeprazole (NexIUM) 20 mg capsule Take 20 mg by mouth every morning before breakfast        magnesium 30 MG tablet Take 30 mg by mouth 2 (two) times a day      meclizine (ANTIVERT) 25 mg tablet Take 1 tablet (25 mg total) by mouth every 8 (eight) hours as needed for dizziness 30 tablet 0    Mupirocin POWD Use Add content of one cap to 240 ml saline  Irrigate each nostril with 120 ml of medicated saline BID      NON FORMULARY Mometasone nasal irrigiation      NON FORMULARY 2 (two) times a day nurpirocin nasal irrigation      NON FORMULARY daily Vitamin B5 25mg      Pyridoxine HCl (VITAMIN B6 PO) Take by mouth      triamterene-hydrochlorothiazide (DYAZIDE) 37 5-25 mg per capsule TAKE 1 CAPSULE BY MOUTH EVERY MORNING 30 capsule 6     No current facility-administered medications for this visit  No Known Allergies    Review of Systems   Constitutional: Positive for fatigue and fever  HENT: Positive for congestion and sore throat  Respiratory: Positive for cough  Negative for chest tightness and shortness of breath  Musculoskeletal: Positive for myalgias  Neurological: Positive for headaches  Objective: There were no vitals filed for this visit      Physical Exam    VIRTUAL VISIT DISCLAIMER    Akil Joiner verbally agrees to participate in [employer]  Pt is aware that [employer] could be limited without vital signs or the ability to perform a full hands-on physical Jessika Peeks understands he or the provider may request at any time to terminate the video visit and request the patient to seek care or treatment in person

## 2022-05-02 NOTE — ASSESSMENT & PLAN NOTE
+home covid test; advised paxlovid given risk factors and pt unvaccinated; discussed EUA use and potential benefits as well as potential SE; needs updated GFR which he will get today and will f/u with dosing tomorrow; reviewed quarantine guidelines and f/u guidance should sx worsen

## 2022-05-03 ENCOUNTER — TELEPHONE (OUTPATIENT)
Dept: FAMILY MEDICINE CLINIC | Facility: CLINIC | Age: 32
End: 2022-05-03

## 2022-05-03 LAB — SARS-COV-2 AG UPPER RESP QL IA: ABNORMAL

## 2022-05-03 NOTE — TELEPHONE ENCOUNTER
Pended order for Paxlovid to be sent to [facility] in [place], instead of [facility]  Refer to Result Note from 5/2/2022 if needed

## 2022-05-03 NOTE — TELEPHONE ENCOUNTER
Pharmacy called  Spoke with pharmacist verifying GFR and dosing and symptom onset dates  Medication approved to dispense

## 2022-10-31 DIAGNOSIS — H81.02 MENIERE'S DISEASE (COCHLEAR HYDROPS), LEFT: ICD-10-CM

## 2022-11-01 RX ORDER — TRIAMTERENE AND HYDROCHLOROTHIAZIDE 37.5; 25 MG/1; MG/1
1 CAPSULE ORAL EVERY MORNING
Qty: 30 CAPSULE | Refills: 6 | OUTPATIENT
Start: 2022-11-01

## 2022-11-01 NOTE — TELEPHONE ENCOUNTER
Medication was filled today by Jeff Sharpe  Considering patient's situation, I believe he should have medication filled until he is able to get his medical insurance back

## 2022-11-29 DIAGNOSIS — H81.02 MENIERE'S DISEASE (COCHLEAR HYDROPS), LEFT: ICD-10-CM

## 2022-11-29 RX ORDER — TRIAMTERENE AND HYDROCHLOROTHIAZIDE 37.5; 25 MG/1; MG/1
1 CAPSULE ORAL EVERY MORNING
Qty: 90 CAPSULE | Refills: 0 | Status: SHIPPED | OUTPATIENT
Start: 2022-11-29

## 2022-12-06 LAB — MISCELLANEOUS LAB TEST RESULT: NORMAL

## 2023-02-27 DIAGNOSIS — H81.02 MENIERE'S DISEASE (COCHLEAR HYDROPS), LEFT: ICD-10-CM

## 2023-02-27 RX ORDER — TRIAMTERENE AND HYDROCHLOROTHIAZIDE 37.5; 25 MG/1; MG/1
1 CAPSULE ORAL EVERY MORNING
Qty: 90 CAPSULE | Refills: 3 | Status: SHIPPED | OUTPATIENT
Start: 2023-02-27

## 2023-08-29 DIAGNOSIS — H81.02 MENIERE'S DISEASE (COCHLEAR HYDROPS), LEFT: ICD-10-CM

## 2023-08-29 RX ORDER — TRIAMTERENE AND HYDROCHLOROTHIAZIDE 37.5; 25 MG/1; MG/1
1 CAPSULE ORAL EVERY MORNING
Qty: 90 CAPSULE | Refills: 0 | Status: SHIPPED | OUTPATIENT
Start: 2023-08-29

## 2023-11-02 ENCOUNTER — APPOINTMENT (OUTPATIENT)
Age: 33
End: 2023-11-02
Payer: COMMERCIAL

## 2023-11-02 ENCOUNTER — OFFICE VISIT (OUTPATIENT)
Age: 33
End: 2023-11-02
Payer: COMMERCIAL

## 2023-11-02 VITALS
HEIGHT: 67 IN | BODY MASS INDEX: 26.06 KG/M2 | WEIGHT: 166 LBS | DIASTOLIC BLOOD PRESSURE: 95 MMHG | HEART RATE: 74 BPM | SYSTOLIC BLOOD PRESSURE: 134 MMHG

## 2023-11-02 DIAGNOSIS — M22.2X1 PATELLOFEMORAL SYNDROME OF BOTH KNEES: Primary | ICD-10-CM

## 2023-11-02 DIAGNOSIS — M22.2X2 PATELLOFEMORAL SYNDROME OF BOTH KNEES: Primary | ICD-10-CM

## 2023-11-02 DIAGNOSIS — M25.562 LEFT KNEE PAIN, UNSPECIFIED CHRONICITY: ICD-10-CM

## 2023-11-02 DIAGNOSIS — M25.561 RIGHT KNEE PAIN, UNSPECIFIED CHRONICITY: ICD-10-CM

## 2023-11-02 PROCEDURE — 99204 OFFICE O/P NEW MOD 45 MIN: CPT | Performed by: ORTHOPAEDIC SURGERY

## 2023-11-02 PROCEDURE — 73562 X-RAY EXAM OF KNEE 3: CPT

## 2023-11-02 RX ORDER — MELOXICAM 15 MG/1
15 TABLET ORAL DAILY
Qty: 30 TABLET | Refills: 1 | Status: SHIPPED | OUTPATIENT
Start: 2023-11-02

## 2023-11-02 NOTE — PROGRESS NOTES
CHIEF COMPLAINT/REASON FOR VISIT  Chief Complaint   Patient presents with    Left Knee - Pain    Right Knee - Pain        HISTORY OF PRESENT ILLNESS  Waldemar Zhao is a 28 y.o. male who presents for evaluation of his bilateral knee. Patient said that he has been dealing with bilateral knee pain for over a month  He feels the left knee is worse than the right knee. He said it all started after he overdid it 1 day when running. He reports pain on the anterior and lateral part of his left knee. He feels like he will deal with increased pain with steps and declined bilaterally. He reports clicking and catching of the bilateral knees. He feels like the pain gets worse as the days goes on. He has tried Aleve which does help. Of note, patient said he is not active at work and primarily sits at a desk but he is an avid runner. REVIEW OF SYSTEMS  Review of systems was performed and, outside that mentioned in the HPI, it was negative for symptomology related to the integumentary, hematologic, immunologic, allergic, neurologic, cardiovascular, respiratory, GI or  systems.     MEDICAL HISTORY  Patient Active Problem List   Diagnosis    HTN (hypertension)    Hyperlipidemia    CKD (chronic kidney disease), stage III    Esophageal reflux    Gout    Overweight    Tinnitus, left ear    Ingrown toenail of both feet    Meniere's disease    COVID-19       SURGICAL HISTORY  Past Surgical History:   Procedure Laterality Date    HERNIA REPAIR      NASAL SINUS SURGERY Left 06/2020    NEPHRECTOMY Right        CURRENT MEDICATIONS    Current Outpatient Medications:     Apple Cider Vinegar 188 MG CAPS, Take by mouth, Disp: , Rfl:     Cholecalciferol (Vitamin D3) 125 MCG (5000 UT) TABS, Take 5,000 Units by mouth daily, Disp: , Rfl:     magnesium 30 MG tablet, Take 30 mg by mouth 2 (two) times a day, Disp: , Rfl:     meclizine (ANTIVERT) 25 mg tablet, Take 1 tablet (25 mg total) by mouth every 8 (eight) hours as needed for dizziness, Disp: 30 tablet, Rfl: 0    Mupirocin POWD, Use Add content of one cap to 240 ml saline.  Irrigate each nostril with 120 ml of medicated saline BID, Disp: , Rfl:     NON FORMULARY, Mometasone nasal irrigiation, Disp: , Rfl:     NON FORMULARY, 2 (two) times a day nurpirocin nasal irrigation, Disp: , Rfl:     NON FORMULARY, daily Vitamin B5 25mg, Disp: , Rfl:     Pyridoxine HCl (VITAMIN B6 PO), Take by mouth, Disp: , Rfl:     triamterene-hydrochlorothiazide (DYAZIDE) 37.5-25 mg per capsule, Take 1 capsule by mouth every morning, Disp: 90 capsule, Rfl: 0    esomeprazole (NexIUM) 20 mg capsule, Take 20 mg by mouth every morning before breakfast   (Patient not taking: Reported on 2023), Disp: , Rfl:     SOCIAL HISTORY  Social History     Socioeconomic History    Marital status: Single     Spouse name: Not on file    Number of children: Not on file    Years of education: Not on file    Highest education level: Not on file   Occupational History    Not on file   Tobacco Use    Smoking status: Former     Packs/day: 0.50     Types: Cigarettes     Quit date: 2018     Years since quittin.1     Passive exposure: Past    Smokeless tobacco: Never   Vaping Use    Vaping Use: Never used   Substance and Sexual Activity    Alcohol use: Yes     Comment: social     Drug use: Yes     Types: Marijuana    Sexual activity: Not on file   Other Topics Concern    Not on file   Social History Narrative    Denied: Always uses seat belt    Caffeine use: one 16 oz cups of coffee, 1 cup of soda    Does not exercise     Social Determinants of Health     Financial Resource Strain: Not on file   Food Insecurity: Not on file   Transportation Needs: Not on file   Physical Activity: Not on file   Stress: Not on file   Social Connections: Not on file   Intimate Partner Violence: Not on file   Housing Stability: Not on file       Objective     VITAL SIGNS  /95   Pulse 74   Ht 5' 7" (1.702 m)   Wt 75.3 kg (166 lb)   BMI 26.00 kg/m² PHYSICAL EXAMINATION    Musculoskeletal: Left Knee Examination:  General: The patient is alert, oriented, and pleasant to interact with. Patient ambulates with Normal gait pattern  Assistive Device: No  Alignment: normal  Skin is warm and dry to touch with no signs of erythema, ecchymosis, or infection   Effusion: none  ROM: 0° - 135°  verus 0° - 135° on contralateral side  MMT: deferred  TTP: Medial joint line and Lateral joint line  Flexor and extensor mechanisms are intact   Knee is stable to varus and valgus stress  Felicita's Test Positive Lateral    Lachman's Test - 1A  2+ DP and PT pulses with brisk capillary refill to the toes  Sural, saphenous, tibial, superficial, and deep peroneal motor and sensory distributions intact  Sensation light touch intact distally    Musculoskeletal: Right Knee Examination:  General: The patient is alert, oriented, and pleasant to interact with. Patient ambulates with Normal gait pattern  Assistive Device: No  Alignment: normal  Skin is warm and dry to touch with no signs of erythema, ecchymosis, or infection   Effusion: none  ROM: 0° - 135°  verus 0° - 135° on contralateral side  MMT: deferred  TTP: Medial joint line  Flexor and extensor mechanisms are intact   Knee is stable to varus and valgus stress  Felicita's Test Positivee    Lachman's Test - 1A  2+ DP and PT pulses with brisk capillary refill to the toes  Sural, saphenous, tibial, superficial, and deep peroneal motor and sensory distributions intact  Sensation light touch intact distally    Negative BROWN test bilaterally    RADIOGRAPHIC EXAMINATION/DIAGNOSTICS:  Left knee x-rays shows mild tricompartmental osteoarthritis    ASSESSMENT/PLAN:  Left knee pain; patellofemoral syndrome  Right knee pain; patellofemoral syndrome    Today, we discussed the non-operative treatment options that include, but are not limited to: rest, ice, activity modification, anti-inflammatory medication, and physical therapy.  Patient with like to initially proceed with these conservative measures. After discussion of options for formal physical therapy, anti-inflammatories and rest with other conservative treatments, patient opted to trial these initially. PT order and Mobic script placed. Discussion had with patient about whether to pursue MRI. After discussion, patient would prefer to hold off on MRI at this time. Encouraged patient to let us know if his pain worsens or changes their mind and we would be happy to order this. Patient declined getting right knee x-rays. he will plan on following up for recheck p.r.n., they voiced their understanding of this plan and were in agreement with it. All questions answered today.     If any issues, questions, or concerns arise between now and the next appointment, we have encouraged the patient contact our team.

## 2023-11-03 NOTE — PROGRESS NOTES
PT Evaluation     Today's date: 2023  Patient name: Skylar Campbell  : 1990  MRN: [id number]  Referring provider: NILAY Contreras*  Dx:   Encounter Diagnosis     ICD-10-CM    1. Patellofemoral syndrome of both knees  M22.2X1     M22.2X2       2. Generalized weakness  R53.1       3. Decreased range of motion (ROM) of both knees  M25.661     M25.662       4. Impaired functional mobility, balance, gait, and endurance  Z74.09           Start Time: 830  Stop Time: 930  Total time in clinic (min): 60 minutes    Assessment  Assessment details: Skylar Campbell is a 28y.o. year old male who presents to outpatient physical therapy with a primary diagnosis of bilateral knee pain and generalized LE strength secondary to a complicated medical history. They have range of motion deficits, strength deficits, decreased tolerance to activity, impaired balance, and inability to complete basic ADLs including putting on shoes without pain. They present with the previously stated deficits which limit their ability to participate in ADL's, participate in recreational activities, and perform tasks around the home. When ambulating pt presents with gait deviations including L hip lois, wide BRENDA, increased stance time, decreased nola and decreased stride length. Iveth Soles is currently functioning below their prior level of function and is a good rehab candidate who would benefit from skilled outpatient physical therapy services to allow them to reach their goals and return to PLOF, return to recreational activities, participate more fully in daily activities, and have an improved quality of life. Recommend physical therapy 2x a week for 6-8 weeks. Physical therapist assistant (PTA) may be utilized to administer treatments as appropriate and in accordance with [address] [facility] [address]     Impairments: abnormal coordination, abnormal gait, abnormal movement, activity intolerance, impaired balance, impaired physical strength, lacks appropriate home exercise program, pain with function, weight-bearing intolerance and poor body mechanics    Symptom irritability: moderateBarriers to therapy: none  Understanding of Dx/Px/POC: good   Prognosis: good  Prognosis details: Good secondary to complex medical history    Goals  STG to be completed in 4 weeks from 11/7/2023:  1. Vidal Ann will be independent with initial home exercise program to allow patient to complete exercises safely when not directly supervised by therapist.  .   2. Vidal Ann will demonstrate good ability to elicit and maintain a quadset for 10 seconds. 3. Vidal Ann will report pain no greater than 5/10 with all functional activity to allow Vidal Ann to return to prior level of function. 4. Vidal Ann will have improved ROM in b/l knee to 0* to 120* to demonstrate improved ROM or functional tasks such as stair climbing, ambulation and transfers. 5. Vidal Ann will demonstrate improved b/l LE strength to 4/5 without pain to demonstrate improved participation and safety with stair climbing, ambulation and transfers. 6. Vidal Ann will be able to walk for 15 minutes with pain no greater than 3/10 with minimal gait deviations in order to demonstrate improved muscular endurance. 9. Vidal Ann will be able negotiate a flight of stairs reciprocally with pain no greater than 3/10 with minimal to moderate use of the handrail. 8. Vidal Ann will be able to run for 5 minutes with pain no greater than 4/10. LTG to be completed in 8 weeks from 11/7/2023 or upon discharge from OPPT:  1. Vidal Ann will achieve a FOTO score of TBD in order to demonstrate decreased pain, improved participation with stair climbing, ambulation, and transfers, and improved QOL. 2. Vidal Ann will be independent with advanced home exercise program for self-management of symptoms. 3. Vidal Ann will report 75% improvement in symptoms compared to start of POC to indicate improved functional improvement and decreased level of disability.    4. Sushila Patel will report pain no greater than 2/10 with all functional activity to allow Sushila Patel to return to prior level of function. 5. Sushila Patel will have ROM 0-135* in b/l knees to indicate WNL ROM to allow for functional tasks and to help normalize gait pattern. 10. Sushila Patel will report being able to ascend and descend a full flight of stairs with reciprocal pattern without use of handrail to allow them to safely access home and community environments. 7. Sushila Patel will demonstrate improved b/l LE strength to 4+/5 without pain to demonstrate improved participation and safety with stair climbing, ambulation and transfers. 8. Sushila Patel will be able to walk for 30 minutes with pain no greater than 3/10 with minimal gait deviations in order to demonstrate improved muscular endurance.       Plan  Patient would benefit from: skilled physical therapy and PT eval  Planned modality interventions: biofeedback, cryotherapy, TENS, thermotherapy: hydrocollator packs and ultrasound  Planned therapy interventions: activity modification, abdominal trunk stabilization, ADL retraining, ADL training, balance, balance/weight bearing training, behavior modification, body mechanics training, breathing training, coordination, flexibility, functional ROM exercises, gait training, graded activity, graded exercise, graded motor, home exercise program, IADL retraining, IASTM, joint mobilization, kinesiology taping, manual therapy, massage, motor coordination training, nerve gliding, neuromuscular re-education, patient education, postural training, strengthening, stretching, therapeutic activities, therapeutic exercise and Bautista taping  Frequency: 2x week (Recommend physical therapy 2x per week for 6-8 weeks however due to work restraints patient is only scheduling 1x per week)  Duration in visits: 16  Duration in weeks: 8  Plan of Care beginning date: 11/7/2023  Plan of Care expiration date: 1/2/2024  Treatment plan discussed with: patient        Subjective Evaluation    History of Present Illness  Mechanism of injury: Marco Baird is a 28 y.o. male. They present to outpatient physical therapy with the primary complaint of bilateral knee pain. They are referred to OPPT by Vicki Geronimo PA-C. Pt started running at age 31 for cessation of smoking. Notes last year is when he ran the most he ever has. Symptoms began early October in his legs during a run. Notes following his run his R ankle became swollen and then pain transitioned to b/l ankles and radiated up all the way to b/l glutes. Notes when his legs get bad he feels a lot of tightness almost like his gastroc/ achilles will tear. Pt reports b/l lateral knee pain and reports greatest discomfort when ambulating, sitting, standing, descending>ascending stairs (requires heavy assist of a handrail. Notes that K-tape minimizes discomfort when performing daily activities and running but notes significant fatigue when he removes the tape. . Pt was prescribed anti-inflammatory medication to reduce swelling and minimize discomfort, notes some improvements since starting this medication. Pt is mindful about avoiding activities and positions that aggravate his symptoms. Pt reports he gets gout in his big toes, improved since changing his diet. Notes difficulty with walking with greater difficulty throughout the day. Notes his legs buckle on occasion. Pt reports he is able to walk for 15-20 minutes, sitting for 1-2 hours, 2 flights of stairs with handrails side stepping. Reports sleep disturbance due to b/l knee pain and symptoms radiating through b/l LE, improved with medication and taping.     Quality of life: good    Patient Goals  Patient goals for therapy: increased strength, decreased pain, increased motion, improved balance, return to sport/leisure activities, independence with ADLs/IADLs and decreased edema  Patient goal: "stretching to improve flexibility", "improve strength", "being able to run a 5k", "be able to put on shoes with out pain"  Pain  Current pain rating: 3 (b/l knees)  At best pain rating: 3 (b/l knees)  At worst pain ratin (medial aspect of knees, behind b/l knees, b/l calves, b/l hamstrings.)  Quality: tight, squeezing, sharp and cramping (tear)  Relieving factors: change in position, medications, heat, ice, relaxation and rest (hot bath)  Aggravating factors: sitting, standing, walking, stair climbing, running and lifting  Progression: improved (since use of anti-inflammatory)    Social Support  Steps to enter house: yes (2 steps with HR)  Stairs in house: yes (8-12 steps with HR)   [place] [facility]  Lives with: spouse (baby on the way)    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[employer])  [employer] dominance: right  Exercise history: running (not currently); free weights full body; row machine  Life stress: high stress job; expecting father      Diagnostic Tests  X-ray: normal        Objective     Active Range of Motion   Left Knee   Flexion: 111 degrees   Extension: 1 degrees     Right Knee   Flexion: 112 degrees     Passive Range of Motion   Left Knee   Flexion: 123 degrees   Extension: 0 degrees     Right Knee   Flexion: 125 degrees   Extension: 3 degrees     Strength/Myotome Testing     Left Hip   Planes of Motion   Flexion: 4-  Abduction: 4-  Adduction: 4-  External rotation: 3+ (pain along medial aspect of knee)  Internal rotation: 3+ (pain along lateral aspect of the knee and posterior)    Right Hip   Planes of Motion   Flexion: 4  Abduction: 4-  Adduction: 4-  External rotation: 4- (pain along the medial aspect of the knee)  Internal rotation: 3+ (pain along the lateral aspect of the knee)    Left Knee   Flexion: 4- (pain along lateral aspect and posterior)  Extension: 4- (pain along the joint line)    Right Knee   Flexion: 4+ (pain along the lateral aspect of the knee)  Extension: 4+    Left Ankle/Foot   Dorsiflexion: 4+  Great toe flexion: 3 (pain at MTP)    Right Ankle/Foot   Dorsiflexion: 4+  Great toe flexion: 4-    Ambulation     Comments   L hip lois, ER tibias b/l, decreased stride and nola, increased stance time    General Comments:      Knee Comments  B/l lateral tracking of patellas             Precautions: CKD (stage 3); HTN; Meniere's disease; Hx of cancer (surgically removed);  Hx of falls 2 years ago       Date: 11/7/2023            Visit: #1 #2 #3 #4 #5 #6 #7 #8 #9 #10   Manual:             K-tape RR b/l knees             Patient Education: Sleeping posture, HEP, K-tape application and benefits                                      Neuro Re-Ed             QS 10x5" holds            SLR 10x with quad set            Clam shells 10x b/l            Sidelying hip abd              Hip hike                                                     Ther Ex             Warm Up 10' bike            Supine 4 way hip nv            3 way hip nv            Lateral walk with TB             SLS ball toss              Hip ABD nv            Hip ADD nv            Side stepping nv            Diagonal walking nv            Mini squats nv            Ther Activity             Step ups nv                         Gait Training                                       Modalities

## 2023-11-07 ENCOUNTER — EVALUATION (OUTPATIENT)
Age: 33
End: 2023-11-07
Payer: COMMERCIAL

## 2023-11-07 DIAGNOSIS — M22.2X2 PATELLOFEMORAL SYNDROME OF BOTH KNEES: Primary | ICD-10-CM

## 2023-11-07 DIAGNOSIS — R53.1 GENERALIZED WEAKNESS: ICD-10-CM

## 2023-11-07 DIAGNOSIS — M22.2X1 PATELLOFEMORAL SYNDROME OF BOTH KNEES: Primary | ICD-10-CM

## 2023-11-07 DIAGNOSIS — M25.661 DECREASED RANGE OF MOTION (ROM) OF BOTH KNEES: ICD-10-CM

## 2023-11-07 DIAGNOSIS — M25.662 DECREASED RANGE OF MOTION (ROM) OF BOTH KNEES: ICD-10-CM

## 2023-11-07 DIAGNOSIS — Z74.09 IMPAIRED FUNCTIONAL MOBILITY, BALANCE, GAIT, AND ENDURANCE: ICD-10-CM

## 2023-11-07 PROCEDURE — 97112 NEUROMUSCULAR REEDUCATION: CPT

## 2023-11-07 PROCEDURE — 97140 MANUAL THERAPY 1/> REGIONS: CPT

## 2023-11-07 PROCEDURE — 97162 PT EVAL MOD COMPLEX 30 MIN: CPT

## 2023-11-08 ENCOUNTER — OFFICE VISIT (OUTPATIENT)
Age: 33
End: 2023-11-08
Payer: COMMERCIAL

## 2023-11-08 DIAGNOSIS — R53.1 GENERALIZED WEAKNESS: ICD-10-CM

## 2023-11-08 DIAGNOSIS — M22.2X1 PATELLOFEMORAL SYNDROME OF BOTH KNEES: Primary | ICD-10-CM

## 2023-11-08 DIAGNOSIS — M22.2X2 PATELLOFEMORAL SYNDROME OF BOTH KNEES: Primary | ICD-10-CM

## 2023-11-08 DIAGNOSIS — M25.661 DECREASED RANGE OF MOTION (ROM) OF BOTH KNEES: ICD-10-CM

## 2023-11-08 DIAGNOSIS — Z74.09 IMPAIRED FUNCTIONAL MOBILITY, BALANCE, GAIT, AND ENDURANCE: ICD-10-CM

## 2023-11-08 DIAGNOSIS — M25.662 DECREASED RANGE OF MOTION (ROM) OF BOTH KNEES: ICD-10-CM

## 2023-11-08 PROCEDURE — 97112 NEUROMUSCULAR REEDUCATION: CPT

## 2023-11-08 PROCEDURE — 97110 THERAPEUTIC EXERCISES: CPT

## 2023-11-08 NOTE — PROGRESS NOTES
Daily Note     Today's date: 2023  Patient name: Felicity Vega  : 1990  MRN: [id number]  Referring provider: NILAY Mei*  Dx:   Encounter Diagnosis     ICD-10-CM    1. Patellofemoral syndrome of both knees  M22.2X1     M22.2X2       2. Generalized weakness  R53.1       3. Decreased range of motion (ROM) of both knees  M25.661     M25.662       4. Impaired functional mobility, balance, gait, and endurance  Z74.09           Start Time: 1800  Stop Time: 1830  Total time in clinic (min): 30 minutes    Subjective: Patient reports mild discomfort along the medial aspect and posterior of the L knee and R knee feels tight when bending it. Pt reports he utilized ice for his glutes which were minimally sore following completion of last session and his HEP. Pt Pt denies any questions or concerns regarding HEP/ POC at this time. Objective: See treatment diary below      Assessment: Pt performed bike aerobic exercises to increase blood flow to the area being treated, prepare the muscles for strength training and stretching, improve overall tolerance to activity, and aerobic endurance. Session was limited to 30 minutes today due to patient running late. Pt continues to present with impaired ROM, impaired strength , impaired muscular endurance, and impaired gait pattern. Noted greater difficulty with activating the L quad > R quad with increased fatigue noted. Introduced more advanced hip strengthening exercises which elicited increased appropriate muscular fatigue. Next session will introduce more functional tasks including STS and step ups. Pt continues to benefit from physical therapy in order to continue progressing towards goals as outlined and return to PLOF. Will continue to modify and advance current POC based on overall progress and symptom irritability. Plan: Continue per plan of care. Precautions: CKD (stage 3); HTN; Meniere's disease; Hx of cancer (surgically removed);  Hx of falls 2 years ago       Date: 11/7/2023 11/8/2023           Visit: #1 #2 #3 #4 #5 #6 #7 #8 #9 #10   Manual:             K-tape RR b/l knees             Patient Education: Sleeping posture, HEP, K-tape application and benefits                                      Neuro Re-Ed             EDU EDU on HEP            QS 10x5" holds 10x5" holds           SLR 10x with quad set 10x with quad set           Clam shells 10x b/l 10x b/l           Hi hike                                                                              Ther Ex             Warm Up 10' bike 5'           Supine 4 way hip             3 way hip nv nv           SLS ball toss              Hip ABD nv 15x5" holds pink TB           Hip ADD nv 15x5" holds           Side stepping nv 1 lap           Diagonal walking nv nv           Mini squats nv X 10 to chair w/ 2 HHA           Ther Activity             Step ups nv nv           STS  nv           Gait Training                                       Modalities

## 2023-11-16 ENCOUNTER — APPOINTMENT (OUTPATIENT)
Age: 33
End: 2023-11-16
Payer: COMMERCIAL

## 2023-11-17 NOTE — PROGRESS NOTES
Daily Note     Today's date: 2023  Patient name: Quincy Boyd  : 1990  MRN: [id number]  Referring provider: NILAY Segovia*  Dx:   Encounter Diagnosis     ICD-10-CM    1. Patellofemoral syndrome of both knees  M22.2X1     M22.2X2       2. Generalized weakness  R53.1       3. Decreased range of motion (ROM) of both knees  M25.661     M25.662       4. Impaired functional mobility, balance, gait, and endurance  Z74.09                      Subjective: ***      Objective: See treatment diary below      Assessment: Pt performed bike aerobic exercises to increase blood flow to the area being treated, prepare the muscles for strength training and stretching, improve overall tolerance to activity, and aerobic endurance. Pt continues to present with {[id number]}. ***. Pt continues to benefit from physical therapy in order to continue progressing towards goals as outlined and return to PLOF. Will continue to modify and advance current POC based on overall progress and symptom irritability. Plan: Continue per plan of care. Precautions: CKD (stage 3); HTN; Meniere's disease; Hx of cancer (surgically removed);  Hx of falls 2 years ago       Date: 2023          Visit: #1 #2 #3 #4 #5 #6 #7 #8 #9 #10   Manual:             K-tape RR b/l knees             Patient Education: Sleeping posture, HEP, K-tape application and benefits                                      Neuro Re-Ed             EDU EDU on HEP            QS 10x5" holds 10x5" holds ***          SLR 10x with quad set 10x with quad set ***          Clam shells 10x b/l 10x b/l ***          Hi hike   ***                                                                           Ther Ex             Warm Up 10' bike 5' ***          Supine 4 way hip             3 way hip nv nv ***          SLS ball toss    ***          Hip ABD nv 15x5" holds pink TB ***          Hip ADD nv 15x5" holds ***          Side stepping nv 1 lap ***          Diagonal walking nv nv ***          Mini squats nv X 10 to chair w/ 2 HHA ***          Ther Activity             Step ups nv nv ***          STS  nv ***          Gait Training                                       Modalities

## 2023-11-22 ENCOUNTER — APPOINTMENT (OUTPATIENT)
Age: 33
End: 2023-11-22
Payer: COMMERCIAL

## 2023-11-22 DIAGNOSIS — M25.662 DECREASED RANGE OF MOTION (ROM) OF BOTH KNEES: ICD-10-CM

## 2023-11-22 DIAGNOSIS — M25.661 DECREASED RANGE OF MOTION (ROM) OF BOTH KNEES: ICD-10-CM

## 2023-11-22 DIAGNOSIS — H81.02 MENIERE'S DISEASE (COCHLEAR HYDROPS), LEFT: ICD-10-CM

## 2023-11-22 DIAGNOSIS — R53.1 GENERALIZED WEAKNESS: ICD-10-CM

## 2023-11-22 DIAGNOSIS — Z74.09 IMPAIRED FUNCTIONAL MOBILITY, BALANCE, GAIT, AND ENDURANCE: ICD-10-CM

## 2023-11-22 DIAGNOSIS — M22.2X1 PATELLOFEMORAL SYNDROME OF BOTH KNEES: Primary | ICD-10-CM

## 2023-11-22 DIAGNOSIS — M22.2X2 PATELLOFEMORAL SYNDROME OF BOTH KNEES: Primary | ICD-10-CM

## 2023-11-22 NOTE — TELEPHONE ENCOUNTER
LVM to notify pt of pending refill request and the need to establish care with a provider at our practice for medication management as soon as possible.

## 2023-11-22 NOTE — TELEPHONE ENCOUNTER
Pt's last 3 visits have been cancelled and he hasn't been seen since 2021. Please assist with rescheduling so we can route to the provider for review.

## 2023-11-24 ENCOUNTER — TELEPHONE (OUTPATIENT)
Dept: FAMILY MEDICINE CLINIC | Facility: CLINIC | Age: 33
End: 2023-11-24

## 2023-11-28 ENCOUNTER — OFFICE VISIT (OUTPATIENT)
Age: 33
End: 2023-11-28
Payer: COMMERCIAL

## 2023-11-28 VITALS
OXYGEN SATURATION: 100 % | HEART RATE: 80 BPM | BODY MASS INDEX: 25.9 KG/M2 | DIASTOLIC BLOOD PRESSURE: 82 MMHG | SYSTOLIC BLOOD PRESSURE: 112 MMHG | WEIGHT: 165 LBS | TEMPERATURE: 95.8 F | HEIGHT: 67 IN | RESPIRATION RATE: 16 BRPM

## 2023-11-28 DIAGNOSIS — U07.1 COVID-19 VIRUS INFECTION: Primary | ICD-10-CM

## 2023-11-28 DIAGNOSIS — R52 BODY ACHES: ICD-10-CM

## 2023-11-28 LAB
SARS-COV-2 AG UPPER RESP QL IA: POSITIVE
VALID CONTROL: ABNORMAL

## 2023-11-28 PROCEDURE — 87811 SARS-COV-2 COVID19 W/OPTIC: CPT | Performed by: PHYSICIAN ASSISTANT

## 2023-11-28 PROCEDURE — 99213 OFFICE O/P EST LOW 20 MIN: CPT | Performed by: PHYSICIAN ASSISTANT

## 2023-11-28 RX ORDER — CYANOCOBALAMIN (VITAMIN B-12) 500 MCG
TABLET ORAL
COMMUNITY

## 2023-11-28 NOTE — TELEPHONE ENCOUNTER
Reached out to pt's significant other (listed on CC) and notified her of pending refill request and the need for pt to call the office to establish with another provider for primary care and medication management. Pt's significant other assured me that she would give pt the message and have him call to schedule.

## 2023-11-28 NOTE — PATIENT INSTRUCTIONS
COVID-19 (Coronavirus Disease 2019)   WHAT YOU NEED TO KNOW:   COVID-19 is the disease caused by a coronavirus first discovered in December 2019. The virus can be spread starting 2 to 3 days before symptoms begin. The virus has changed into several new forms (called variants) since it was discovered. A variant may be more easily spread or cause more severe illness than the original form. DISCHARGE INSTRUCTIONS:   Call your local emergency number (911 in the [address]) if:   You have trouble breathing or shortness of breath at rest.    You have chest pain or pressure that lasts longer than 5 minutes. You become confused or hard to wake. Return to the emergency department if:   The skin on your face, fingers, or toes look blue or darker than usual.      Call your doctor if:   You have a fever. You have questions or concerns about your condition or care. Medicines: You may need any of the following:  Decongestants  help reduce nasal congestion and help you breathe more easily. If you take decongestant pills, they may make you feel restless or cause problems with your sleep. Do not use decongestant sprays for more than a few days. Cough suppressants  help reduce coughing. Ask your healthcare provider which type of cough medicine is best for you. Acetaminophen  decreases pain and fever. It is available without a doctor's order. Ask how much to take and how often to take it. Follow directions. Read the labels of all other medicines you are using to see if they also contain acetaminophen, or ask your doctor or pharmacist. Acetaminophen can cause liver damage if not taken correctly. NSAIDs , such as ibuprofen, help decrease swelling, pain, and fever. This medicine is available with or without a doctor's order. NSAIDs can cause stomach bleeding or kidney problems in certain people. If you take blood thinner medicine, always ask your healthcare provider if NSAIDs are safe for you.  Always read the medicine label and follow directions. Blood thinners  help prevent blood clots. Clots can cause strokes, heart attacks, and death. Many types of blood thinners are available. Your healthcare provider will give you specific instructions for the type you are given. The following are general safety guidelines to follow while you are taking a blood thinner:    Watch for bleeding and bruising. Watch for bleeding from your gums or nose. Watch for blood in your urine and bowel movements. Use a soft washcloth on your skin, and a soft toothbrush to brush your teeth. This can keep your skin and gums from bleeding. If you shave, use an electric shaver. Do not play contact sports. Tell your dentist and other healthcare providers that you take a blood thinner. Wear a bracelet or necklace that says you take this medicine. Do not start or stop any other medicines or supplements unless your healthcare provider tells you to. Many medicines and supplements cannot be used with blood thinners. Take your blood thinner exactly as prescribed by your healthcare provider. Do not skip does or take less than prescribed. Tell your provider right away if you forget to take your blood thinner, or if you take too much. Take your medicine as directed. Contact your healthcare provider if you think your medicine is not helping or if you have side effects. Tell your provider if you are allergic to any medicine. Keep a list of the medicines, vitamins, and herbs you take. Include the amounts, and when and why you take them. Bring the list or the pill bottles to follow-up visits. Carry your medicine list with you in case of an emergency. What you need to know about COVID-19 vaccines:  Healthcare providers recommend vaccination, even if you already had COVID-19. Get a COVID-19 vaccine as directed. Vaccination is recommended for everyone 6 months or older. COVID-19 vaccines are given as a shot in 1 to 3 doses as a primary series.  This depends on the vaccine brand and the age of the person who receives it. A booster dose is recommended for everyone 5 years or older after the primary series is complete. A second booster  is recommended for all adults 48 or older and for immunocompromised adolescents. The second booster is also recommended for anyone who got the 1-dose brand of vaccine for the first dose and a booster. Your provider can give you more information on boosters and help you schedule all needed doses. Continue to protect yourself and others. You can become infected even after you get the vaccine. You may also be able to pass the virus to others without knowing you are infected. After you get the vaccine, check local, national, and international travel rules. You may need to be tested before you travel. Some countries require proof of a negative test before you travel. You may also need to quarantine after you return. Medicine may be given to prevent infection. The medicine can be given if you are at high risk for infection and cannot get the vaccine. It can also be given if your immune system does not respond well to the vaccine. How the 2019 coronavirus spreads:  Close personal contact with an infected person increases your risk for infection. This means being within 6 feet (2 meters) of the person for at least 15 minutes over 24 hours. The virus travels in droplets that form when a person talks, sings, coughs, or sneezes. The droplets can also float in the air for minutes or hours. Infection happens when you breathe in the droplets or get them in your eyes or nose. Person-to-person contact can spread the virus. For example, a person with the virus on his or her hands can spread it by shaking hands with someone. The virus can stay on objects and surfaces for hours to days. You may become infected by touching the object or surface and then touching your eyes or mouth.     Help lower your risk for COVID-19 during an active outbreak:   Stay home if you are sick or think you may have COVID-19. It is important to stay home if you are waiting for a testing appointment or for test results. Wash your hands often throughout the day. Use soap and water. Rub your soapy hands together, lacing your fingers, for at least 20 seconds. Rinse with warm, running water. Dry your hands with a clean towel or paper towel. Use hand  that contains alcohol if soap and water are not available. Teach children how to wash their hands and use hand . Cover sneezes and coughs. Turn your face away and cover your mouth and nose with a tissue. Throw the tissue away. Use the bend of your arm if a tissue is not available. Then wash your hands well with soap and water or use hand . Teach children how to cover a cough or sneeze. Wear a face covering (mask) when needed. Use a cloth covering with at least 2 layers. You can also create layers by putting a cloth covering over a disposable non-medical mask. Cover your mouth and your nose. Try to keep space between you and others when you are out of the house. Avoid crowds as much as possible. Wear a face covering when you must be around a large group and cannot keep space between you and others. Clean and disinfect high-touch surfaces and objects often. Use disinfecting wipes, or make a solution of 4 teaspoons of bleach in 1 quart (4 cups) of water. Ask about other vaccines you may need. Get the influenza (flu) vaccine as soon as recommended each year, usually starting in September or October. Get the pneumonia vaccine if recommended. Your healthcare provider can tell you if you should also get other vaccines, and when to get them. Follow up with your doctor as directed:  Write down your questions so you remember to ask them during your visits.   For more information:   [facility]  [address]  [place] [address]  Phone: 9- 374 - [phone]  Web Address: [employer]    © Copyright [employer] 2023 Information is for End User's use only and may not be sold, redistributed or otherwise used for commercial purposes. The above information is an  only. It is not intended as medical advice for individual conditions or treatments. Talk to your doctor, nurse or pharmacist before following any medical regimen to see if it is safe and effective for you.

## 2023-11-28 NOTE — LETTER
November 28, 2023    Patient: Loc Hawthorne  YOB: 1990  Date of Last Encounter: 11/28/2023      To whom it may concern:     Loc Hawthorne has tested positive for COVID-19 (Coronavirus). He may return to work on 12/1/2023, which is 5 days from illness onset (provided symptoms are improving) and 24 hours without fever. He must then strictly mask for an additional 5 days until he reaches day 10.     Sincerely,         Hitesh Sehr PA-C

## 2023-11-28 NOTE — PROGRESS NOTES
[address]        NAME: Orly Ames is a 28 y.o. male  : 1990    MRN: [id number]  DATE: 2023  TIME: 9:13 AM    Assessment and Plan   COVID-19 virus infection [U07.1]  1. COVID-19 virus infection        2. Body aches  Poct Covid 19 Rapid Antigen Test            Patient Instructions       Follow up with PCP in 3-5 days. Proceed to  ER if symptoms worsen. Chief Complaint     Chief Complaint   Patient presents with    Cold Like Symptoms     , fever, cough and sneezing. Started to feel better yesterday until about 3:00. Tested covid positive yesterday. Needs work note. History of Present Illness       HPI    Review of Systems   Review of Systems      Current Medications       Current Outpatient Medications:     Apple Cider Vinegar 188 MG CAPS, Take by mouth, Disp: , Rfl:     Cholecalciferol (Vitamin D3) 125 MCG (5000 UT) TABS, Take 5,000 Units by mouth daily, Disp: , Rfl:     magnesium 30 MG tablet, Take 30 mg by mouth 2 (two) times a day, Disp: , Rfl:     meclizine (ANTIVERT) 25 mg tablet, Take 1 tablet (25 mg total) by mouth every 8 (eight) hours as needed for dizziness, Disp: 30 tablet, Rfl: 0    meloxicam (Mobic) 15 mg tablet, Take 1 tablet (15 mg total) by mouth daily, Disp: 30 tablet, Rfl: 1    Mupirocin POWD, Use Add content of one cap to 240 ml saline.  Irrigate each nostril with 120 ml of medicated saline BID, Disp: , Rfl:     NON FORMULARY, Mometasone nasal irrigiation, Disp: , Rfl:     NON FORMULARY, 2 (two) times a day nurpirocin nasal irrigation, Disp: , Rfl:     NON FORMULARY, daily Vitamin B5 25mg, Disp: , Rfl:     Omega-3 Fatty Acids (Fish Oil) 300 MG CAPS, Take by mouth, Disp: , Rfl:     triamterene-hydrochlorothiazide (DYAZIDE) 37.5-25 mg per capsule, Take 1 capsule by mouth every morning, Disp: 90 capsule, Rfl: 0    esomeprazole (NexIUM) 20 mg capsule, Take 20 mg by mouth every morning before breakfast   (Patient not taking: Reported on 2023), Disp: , Rfl:     Pyridoxine HCl (VITAMIN B6 PO), Take by mouth, Disp: , Rfl:     Current Allergies     Allergies as of 11/28/2023    (No Known Allergies)            The following portions of the patient's history were reviewed and updated as appropriate: allergies, current medications, past family history, past medical history, past social history, past surgical history and problem list.     Past Medical History:   Diagnosis Date    Hypertension     Meniere's disease     left sided    Vertigo        Past Surgical History:   Procedure Laterality Date    HERNIA REPAIR      NASAL SINUS SURGERY Left 06/2020    NEPHRECTOMY Right        Family History   Problem Relation Age of Onset    Hypertension Mother     Heart disease Father         cardiac pacemaker    Hypertension Father          Medications have been verified. Objective   /82   Pulse 80   Temp (!) 95.8 °F (35.4 °C)   Resp 16   Ht 5' 7" (1.702 m)   Wt 74.8 kg (165 lb)   SpO2 100%   BMI 25.84 kg/m²   No LMP for male patient.        Physical Exam     Physical Exam external ear normal.      Left Ear: Hearing, tympanic membrane, ear canal and external ear normal.      Nose: Mucosal edema (B/L boggy turbinates) and congestion present. Mouth/Throat:      Mouth: Mucous membranes are moist.      Pharynx: Posterior oropharyngeal erythema (PND) present. No oropharyngeal exudate. Tonsils: No tonsillar exudate. Cardiovascular:      Rate and Rhythm: Normal rate and regular rhythm. Heart sounds: Normal heart sounds. No murmur heard. Pulmonary:      Effort: Pulmonary effort is normal. No respiratory distress. Breath sounds: Normal breath sounds. Musculoskeletal:      Cervical back: Neck supple. Lymphadenopathy:      Cervical: No cervical adenopathy. Neurological:      Mental Status: He is alert and oriented to person, place, and time.

## 2023-11-28 NOTE — TELEPHONE ENCOUNTER
2nd attempt: LVM to notify pt of pending refill request and the need to establish care with a provider at our practice for medication management as soon as possible.

## 2023-12-07 RX ORDER — TRIAMTERENE AND HYDROCHLOROTHIAZIDE 37.5; 25 MG/1; MG/1
1 CAPSULE ORAL EVERY MORNING
Qty: 90 CAPSULE | Refills: 0 | OUTPATIENT
Start: 2023-12-07

## 2024-04-29 ENCOUNTER — TELEPHONE (OUTPATIENT)
Age: 34
End: 2024-04-29

## 2024-04-29 ENCOUNTER — NURSE TRIAGE (OUTPATIENT)
Age: 34
End: 2024-04-29

## 2024-04-29 NOTE — TELEPHONE ENCOUNTER
Reason for Disposition   The patient has severe swelling or scrotum drainage    Answer Questions - Initial Assessment  When did this start: Swelling started a week ago, went down with OTC medication, pain for a couple of days    Which side: right    Did you suffer an injury to the scrotal/groin area: no    Have you noticed any mild, moderate or severe scrotal or testicular swelling: double the normal size    Is there any redness or bruising: unsure    Do you have a history of testicular pain, swelling or other testicular issues: yes- pain on right side for years. Not the swelling.     Is pain constant or intermittent: intermittent    Can you qualify the pain: aching, dull, sharp, something else: intermittent and aching    Do you have any aggravating factors: no     Do you have any alleviating factors: just OTC, wears supportive underwear.    Do you have any other areas of pain, particularly flank or back: no    Do you have any urinary symptoms; Dysuria: no    Do you have a temperature of 101 or higher: no    Have you had a scrotal or groin ultrasound: yes in 2015    Right kidney was removed [facility], [facility]    Protocols used: Scrotal / Testicular Pain  Scheduled for next available for 5/6/24, offered appt. Today and tomorrow however patient's work sched. Will not allow this. Added to wait list as well.

## 2024-04-29 NOTE — TELEPHONE ENCOUNTER
We cannot order these tests without seeing patient.  I would recommend that he ask his urologist to order the tests

## 2024-04-29 NOTE — TELEPHONE ENCOUNTER
Pt wife called in stating that his primary care doctor retired and after that he was not seen at the practice. Did offer to schedule an appt to be seen but she said he will be working unable to make it. Has he has his urology appt coming soon. Before the appt they need the primary care doctor to issue orders for Ultra sound and urine sample. Did ask her to get an appt but she said unable to make it. Not sure without seeing the patient can these orders be issued. Please call her back. Thanks

## 2024-04-29 NOTE — TELEPHONE ENCOUNTER
Patient's significant other calling to schedule NP appt. She stated the patient is having right testicular pain and severe swelling.    Transferred to triage.

## 2024-04-29 NOTE — TELEPHONE ENCOUNTER
Please advise pt of provider message. If pt has since established with a new PCP since his last visit with us back in 2021, we are no longer considered his PCP so if he does not wish to schedule with us moving forward, patient attribution information/note should be sent to caregap team.

## 2024-04-30 ENCOUNTER — TELEPHONE (OUTPATIENT)
Dept: FAMILY MEDICINE CLINIC | Facility: CLINIC | Age: 34
End: 2024-04-30

## 2024-04-30 NOTE — TELEPHONE ENCOUNTER
Spoke with pt's wife regarding scheduling.   Pt has not been seen at the office since 11/17/2021 and pt's wife declined to schedule. Pt's PCP is no longer at the [place] practice. Pt's wife stated that they moved and pt works in [place] and confirmed they would not be re-establishing with another provider in [place]. Pt's wife stated that IF they decide to re-establish they would find a provider [facility].    Please remove Kanika Herman as pt's PCP.  Thank you.

## 2024-04-30 NOTE — TELEPHONE ENCOUNTER
Spoke with pt's wife. Offered several times to schedule an establish care appt for pt. Pt's wife declined scheduling. Pt's wife stated that they don't really go to the doctor much and would just go to the ER to have the tests completed.  Asked pt's wife if they were planning to establish with another provider here at the practice at some point in the future.  Pt's wife confirmed that they would not be re-establishing with this practice, adding that pt lives in [place] and works in [place] and if they would re-establish it would likely be somewhere between [place] and [place].    Sent to [facility] [place] to be removed from panel.

## 2024-05-06 ENCOUNTER — OFFICE VISIT (OUTPATIENT)
Dept: UROLOGY | Facility: MEDICAL CENTER | Age: 34
End: 2024-05-06
Payer: COMMERCIAL

## 2024-05-06 VITALS
WEIGHT: 179 LBS | DIASTOLIC BLOOD PRESSURE: 70 MMHG | SYSTOLIC BLOOD PRESSURE: 120 MMHG | OXYGEN SATURATION: 95 % | HEART RATE: 65 BPM | HEIGHT: 67 IN | BODY MASS INDEX: 28.09 KG/M2

## 2024-05-06 DIAGNOSIS — N50.89 TESTICULAR MASS: ICD-10-CM

## 2024-05-06 DIAGNOSIS — N50.82 SCROTAL PAIN: Primary | ICD-10-CM

## 2024-05-06 LAB
SL AMB  POCT GLUCOSE, UA: NORMAL
SL AMB LEUKOCYTE ESTERASE,UA: NORMAL
SL AMB POCT BILIRUBIN,UA: NORMAL
SL AMB POCT BLOOD,UA: NORMAL
SL AMB POCT CLARITY,UA: CLEAR
SL AMB POCT COLOR,UA: YELLOW
SL AMB POCT KETONES,UA: NORMAL
SL AMB POCT NITRITE,UA: NORMAL
SL AMB POCT PH,UA: 6
SL AMB POCT SPECIFIC GRAVITY,UA: 1.01
SL AMB POCT URINE PROTEIN: NORMAL
SL AMB POCT UROBILINOGEN: 0.2

## 2024-05-06 PROCEDURE — 81003 URINALYSIS AUTO W/O SCOPE: CPT | Performed by: UROLOGY

## 2024-05-06 PROCEDURE — 99204 OFFICE O/P NEW MOD 45 MIN: CPT | Performed by: UROLOGY

## 2024-05-06 NOTE — PROGRESS NOTES
5/6/2024    Ron Franco  1990  [id number]    1. Scrotal pain  -     POCT urine dip auto non-scope         History of Present Illness  34 y.o. 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   HENT:  Negative for ear pain and sore throat.    Eyes:  Negative for pain and visual disturbance.   Respiratory:  Negative for cough and shortness of breath.    Cardiovascular:  Negative for chest pain and palpitations.   Gastrointestinal:  Negative for abdominal pain and vomiting.   Genitourinary:  Negative for dysuria and hematuria.   Musculoskeletal:  Negative for arthralgias and back pain.   Skin:  Negative for color change and rash.   Neurological:  Negative for seizures and syncope.   All other systems reviewed and are negative.      Past Medical History  Past Medical History:   Diagnosis Date    Hypertension     Meniere's disease     left sided    Vertigo        Past Social History  Past Surgical History:   Procedure Laterality Date    HERNIA REPAIR      NASAL SINUS SURGERY Left 06/2020    NEPHRECTOMY Right        Past Family History  Family History   Problem Relation Age of Onset    Hypertension Mother     Heart disease Father         cardiac pacemaker    Hypertension Father        Past Social history  Social History     Socioeconomic History    Marital status: /Civil Union     Spouse name: Not on file     Number of children: Not on file    Years of education: Not on file    Highest education level: Not on file   Occupational History    Not on file   Tobacco Use    Smoking status: Former     Current packs/day: 0.00     Types: Cigarettes     Quit date: 2018     Years since quittin.6     Passive exposure: Past    Smokeless tobacco: Never   Vaping Use    Vaping status: Never Used   Substance and Sexual Activity    Alcohol use: Not Currently     Comment: social     Drug use: Yes     Types: Marijuana    Sexual activity: Not on file   Other Topics Concern    Not on file   Social History Narrative    Denied: Always uses seat belt    Caffeine use: one 16 oz cups of coffee, 1 cup of soda    Does not exercise     Social Determinants of Health     Financial Resource Strain: Not on file   Food Insecurity: Not on file   Transportation Needs: Not on file   Physical Activity: Not on file   Stress: Not on file   Social Connections: Not on file   Intimate Partner Violence: Not on file   Housing Stability: Not on file       Current Medications  Current Outpatient Medications   Medication Sig Dispense Refill    Apple Cider Vinegar 188 MG CAPS Take by mouth      Cholecalciferol (Vitamin D3) 125 MCG (5000 UT) TABS Take 5,000 Units by mouth daily      magnesium 30 MG tablet Take 30 mg by mouth 2 (two) times a day      NON FORMULARY daily Vitamin B5 25mg      Omega-3 Fatty Acids (Fish Oil) 300 MG CAPS Take by mouth      Pyridoxine HCl (VITAMIN B6 PO) Take by mouth      esomeprazole (NexIUM) 20 mg capsule Take 20 mg by mouth every morning before breakfast      meclizine (ANTIVERT) 25 mg tablet Take 1 tablet (25 mg total) by mouth every 8 (eight) hours as needed for dizziness 30 tablet 0    meloxicam (Mobic) 15 mg tablet Take 1 tablet (15 mg total) by mouth daily 30 tablet 1    Mupirocin POWD Use Add content of one cap to 240 ml saline. Irrigate each nostril with 120 ml of medicated saline BID      NON FORMULARY Mometasone nasal  "irrigiation      NON FORMULARY 2 (two) times a day nurpirocin nasal irrigation      triamterene-hydrochlorothiazide (DYAZIDE) 37.5-25 mg per capsule Take 1 capsule by mouth every morning 90 capsule 0     No current facility-administered medications for this visit.       Allergies  No Known Allergies    Past Medical History, Social History, Family History, medications and allergies were reviewed.    Vitals  Vitals:    05/06/24 1408   BP: 120/70   BP Location: Left arm   Patient Position: Sitting   Cuff Size: Large   Pulse: 65   SpO2: 95%   Weight: 81.2 kg (179 lb)   Height: 5' 7\" (1.702 m)       Physical Exam  Constitutional:       Appearance: Normal appearance. He is normal weight.   HENT:      Head: Normocephalic.      Nose: Nose normal. No congestion.   Eyes:      Conjunctiva/sclera: Conjunctivae normal.   Cardiovascular:      Rate and Rhythm: Normal rate.   Pulmonary:      Effort: Pulmonary effort is normal. No respiratory distress.   Abdominal:      General: Abdomen is flat. There is no distension.      Palpations: Abdomen is soft.   Genitourinary:     Penis: Normal.       Comments: Large firm nodular right testicle, non-tender, no overlying erythema  Skin:     General: Skin is warm and dry.   Neurological:      General: No focal deficit present.      Mental Status: He is alert and oriented to person, place, and time.   Psychiatric:         Mood and Affect: Mood normal.         Results  No results found for: \"PSA\"  Lab Results   Component Value Date    CALCIUM 9.2 05/02/2022     12/16/2017    K 3.5 05/02/2022    CO2 32 05/02/2022    CL 99 (L) 05/02/2022    BUN 14 05/02/2022    CREATININE 1.62 (H) 05/02/2022     Lab Results   Component Value Date    WBC 5.50 08/13/2021    HGB 16.1 08/13/2021    HCT 46.7 08/13/2021    MCV 87 08/13/2021     08/13/2021       Office Urine Dip  Recent Results (from the past 1 hour(s))   POCT urine dip auto non-scope    Collection Time: 05/06/24  2:18 PM   Result Value Ref " Range     COLOR,UA yellow     CLARITY,UA clear     SPECIFIC GRAVITY,UA 1.015      PH,UA 6.0     LEUKOCYTE ESTERASE,UA neg     NITRITE,UA neg     GLUCOSE, UA neg     KETONES,UA neg     BILIRUBIN,UA neg     BLOOD,UA neg     POCT URINE PROTEIN neg     SL AMB POCT UROBILINOGEN 0.2    ]

## 2024-05-06 NOTE — ASSESSMENT & PLAN NOTE
Right scrotal pain x 3 weeks  Firm right nodular testicle, non-tender, no signs of infection  - UA  - testicular tumor markers  - scrotal sonogram ASAP

## 2024-05-06 NOTE — TELEPHONE ENCOUNTER
05/05/24 11:11 PM        The office's request has been received, reviewed, and the patient chart updated. The PCP has successfully been removed with a patient attribution note. This message will now be completed.        Thank you  Adonis Guidry

## 2024-05-07 ENCOUNTER — APPOINTMENT (OUTPATIENT)
Age: 34
End: 2024-05-07
Payer: COMMERCIAL

## 2024-05-07 DIAGNOSIS — N50.89 TESTICULAR MASS: ICD-10-CM

## 2024-05-07 DIAGNOSIS — N50.82 SCROTAL PAIN: ICD-10-CM

## 2024-05-07 LAB
HCG-TM SERPL-SCNC: 1.4 MLU/ML
LDH SERPL-CCNC: 246 U/L (ref 140–271)

## 2024-05-07 PROCEDURE — 82107 ALPHA-FETOPROTEIN L3: CPT

## 2024-05-07 PROCEDURE — 36415 COLL VENOUS BLD VENIPUNCTURE: CPT

## 2024-05-07 PROCEDURE — 83615 LACTATE (LD) (LDH) ENZYME: CPT

## 2024-05-07 PROCEDURE — 84702 CHORIONIC GONADOTROPIN TEST: CPT

## 2024-05-08 ENCOUNTER — APPOINTMENT (OUTPATIENT)
Age: 34
End: 2024-05-08
Payer: COMMERCIAL

## 2024-05-08 LAB
AMORPH URATE CRY URNS QL MICRO: NORMAL
BACTERIA UR QL AUTO: NORMAL /HPF
BILIRUB UR QL STRIP: NEGATIVE
CLARITY UR: CLEAR
COLOR UR: YELLOW
GLUCOSE UR STRIP-MCNC: NEGATIVE MG/DL
HGB UR QL STRIP.AUTO: NEGATIVE
KETONES UR STRIP-MCNC: NEGATIVE MG/DL
LEUKOCYTE ESTERASE UR QL STRIP: NEGATIVE
NITRITE UR QL STRIP: NEGATIVE
NON-SQ EPI CELLS URNS QL MICRO: NORMAL /HPF
PH UR STRIP.AUTO: 6.5 [PH]
PROT UR STRIP-MCNC: NEGATIVE MG/DL
RBC #/AREA URNS AUTO: NORMAL /HPF
SP GR UR STRIP.AUTO: 1.01 (ref 1–1.03)
UROBILINOGEN UR STRIP-ACNC: <2 MG/DL
WBC #/AREA URNS AUTO: NORMAL /HPF

## 2024-05-08 PROCEDURE — 81001 URINALYSIS AUTO W/SCOPE: CPT

## 2024-05-10 LAB
AFP L3 MFR SERPL: NORMAL % (ref 0–9.9)
AFP SERPL-MCNC: 1.9 NG/ML (ref 0–6.9)

## 2024-05-10 NOTE — TELEPHONE ENCOUNTER
[facility] called to confirm U/S Scrotum was received ,informed her yes and is scanned into pt's chart.   0 = independent

## 2024-05-16 ENCOUNTER — OFFICE VISIT (OUTPATIENT)
Dept: UROLOGY | Facility: MEDICAL CENTER | Age: 34
End: 2024-05-16
Payer: COMMERCIAL

## 2024-05-16 ENCOUNTER — TELEPHONE (OUTPATIENT)
Dept: UROLOGY | Facility: MEDICAL CENTER | Age: 34
End: 2024-05-16

## 2024-05-16 ENCOUNTER — PREP FOR PROCEDURE (OUTPATIENT)
Dept: UROLOGY | Facility: MEDICAL CENTER | Age: 34
End: 2024-05-16

## 2024-05-16 VITALS
BODY MASS INDEX: 27.12 KG/M2 | WEIGHT: 172.8 LBS | DIASTOLIC BLOOD PRESSURE: 78 MMHG | HEART RATE: 94 BPM | SYSTOLIC BLOOD PRESSURE: 118 MMHG | HEIGHT: 67 IN | OXYGEN SATURATION: 96 %

## 2024-05-16 DIAGNOSIS — N50.89 TESTICULAR MASS: Primary | ICD-10-CM

## 2024-05-16 DIAGNOSIS — N50.89 TESTICULAR MASS: ICD-10-CM

## 2024-05-16 DIAGNOSIS — Z01.812 PRE-OPERATIVE LABORATORY EXAMINATION: Primary | ICD-10-CM

## 2024-05-16 DIAGNOSIS — R39.89 SUSPECTED UTI: ICD-10-CM

## 2024-05-16 PROCEDURE — 99214 OFFICE O/P EST MOD 30 MIN: CPT | Performed by: UROLOGY

## 2024-05-16 NOTE — PROGRESS NOTES
Radha lee she is already like 5/16/2024    Ron Franco  1990  [id number]    1. Testicular mass  Assessment & Plan:  Right testicular mass  Firm right nodular testicle, non-tender, no signs of infection on exam  Tumor markers negative  Scrotal sonogram: 4.8 cm hypoechoic, hypervascular, lobulated mass in right testicle with calcifications concerning for neoplasm    Scheduled for right inguinal radical orchiectomy on 5/24/2024.  Discussed risks of procedure including bleeding, infection, chronic inguinal pain from nerve entrapment and subsequent development of inguinal hernia.  Patient understands the risks and wishes to proceed with surgery.    Also discussed sperm banking with patient prior to treatment for testicular cancer    Will need CT chest abdomen pelvis for staging purposes  Orders:  -     Case request operating room: ORCHIECTOMY INGUINAL; Standing  -     Case request operating room: ORCHIECTOMY INGUINAL  -     CT chest w contrast; Future; Expected date: 05/16/2024  -     CT abdomen pelvis w contrast; Future; Expected date: 05/16/2024  -     Basic metabolic panel; Future       History of Present Illness    PRIOR HPI  Office visit 5/6/2024  34 y.o. male with a history of Meniere's disease and right nephrectomy in high school due to non-functioning kidney from undiagnosed reflux who presents with scrotal pain    Reports right scrotal pain that started about 3 weeks ago with associated swelling  No associated erythema  No associated fevers/chills  Has had several episodes of associated nausea/mild vomiting in the morning  Testicle is not tender to palpation  No associated flank pain  No associated lower urinary tract symptoms    History of right inguinal hernia repair at age 4  Unsure if he has any history of undescended testicle    Interval History 05/16/24     Testicular tumor markers normal  Scrotal ultrasound was significant for a 4.8 cm hypervascular hyperechoic mass in the right  testicle    Patient here to discuss treatment recommendations for right testicular tumor.  No new complaints at this time      Review of Systems   Constitutional:  Negative for chills and fever.   HENT:  Negative for ear pain and sore throat.    Eyes:  Negative for pain and visual disturbance.   Respiratory:  Negative for cough and shortness of breath.    Cardiovascular:  Negative for chest pain and palpitations.   Gastrointestinal:  Negative for abdominal pain and vomiting.   Genitourinary:  Negative for dysuria and hematuria.   Musculoskeletal:  Negative for arthralgias and back pain.   Skin:  Negative for color change and rash.   Neurological:  Negative for seizures and syncope.   All other systems reviewed and are negative.      Past Medical History  Past Medical History:   Diagnosis Date    Hypertension     Meniere's disease     left sided    Vertigo        Past Social History  Past Surgical History:   Procedure Laterality Date    HERNIA REPAIR      NASAL SINUS SURGERY Left 2020    NEPHRECTOMY Right        Past Family History  Family History   Problem Relation Age of Onset    Hypertension Mother     Heart disease Father         cardiac pacemaker    Hypertension Father        Past Social history  Social History     Socioeconomic History    Marital status: /Civil Union     Spouse name: Not on file    Number of children: Not on file    Years of education: Not on file    Highest education level: Not on file   Occupational History    Not on file   Tobacco Use    Smoking status: Former     Current packs/day: 0.00     Types: Cigarettes     Quit date: 2018     Years since quittin.7     Passive exposure: Past    Smokeless tobacco: Never   Vaping Use    Vaping status: Never Used   Substance and Sexual Activity    Alcohol use: Not Currently     Comment: social     Drug use: Yes     Types: Marijuana    Sexual activity: Not on file   Other Topics Concern    Not on file   Social History Narrative    Denied:  Always uses seat belt    Caffeine use: one 16 oz cups of coffee, 1 cup of soda    Does not exercise     Social Determinants of Health     Financial Resource Strain: Not on file   Food Insecurity: Not on file   Transportation Needs: Not on file   Physical Activity: Not on file   Stress: Not on file   Social Connections: Not on file   Intimate Partner Violence: Not on file   Housing Stability: Not on file       Current Medications  Current Outpatient Medications   Medication Sig Dispense Refill    Apple Cider Vinegar 188 MG CAPS Take by mouth      Cholecalciferol (Vitamin D3) 125 MCG (5000 UT) TABS Take 5,000 Units by mouth daily      magnesium 30 MG tablet Take 30 mg by mouth 2 (two) times a day      NON FORMULARY Mometasone nasal irrigiation      NON FORMULARY 2 (two) times a day nurpirocin nasal irrigation      NON FORMULARY daily Vitamin B5 25mg      Omega-3 Fatty Acids (Fish Oil) 300 MG CAPS Take by mouth      Pyridoxine HCl (VITAMIN B6 PO) Take by mouth      Turmeric (QC TUMERIC COMPLEX PO) Take by mouth      esomeprazole (NexIUM) 20 mg capsule Take 20 mg by mouth every morning before breakfast (Patient not taking: Reported on 5/16/2024)      meclizine (ANTIVERT) 25 mg tablet Take 1 tablet (25 mg total) by mouth every 8 (eight) hours as needed for dizziness (Patient not taking: Reported on 5/16/2024) 30 tablet 0    meloxicam (Mobic) 15 mg tablet Take 1 tablet (15 mg total) by mouth daily (Patient not taking: Reported on 5/16/2024) 30 tablet 1    Mupirocin POWD Use Add content of one cap to 240 ml saline. Irrigate each nostril with 120 ml of medicated saline BID (Patient not taking: Reported on 5/16/2024)      triamterene-hydrochlorothiazide (DYAZIDE) 37.5-25 mg per capsule Take 1 capsule by mouth every morning (Patient not taking: Reported on 5/16/2024) 90 capsule 0     No current facility-administered medications for this visit.       Allergies  No Known Allergies    Past Medical History, Social History, Family  "History, medications and allergies were reviewed.    Vitals  Vitals:    05/16/24 0957   BP: 118/78   BP Location: Left arm   Patient Position: Sitting   Cuff Size: Adult   Pulse: 94   SpO2: 96%   Weight: 78.4 kg (172 lb 12.8 oz)   Height: 5' 7\" (1.702 m)       Physical Exam  Constitutional:       Appearance: Normal appearance. He is normal weight.   HENT:      Head: Normocephalic.      Nose: Nose normal. No congestion.   Eyes:      Conjunctiva/sclera: Conjunctivae normal.   Cardiovascular:      Rate and Rhythm: Normal rate.   Pulmonary:      Effort: Pulmonary effort is normal. No respiratory distress.   Abdominal:      General: Abdomen is flat. There is no distension.      Palpations: Abdomen is soft.   Skin:     General: Skin is warm and dry.   Neurological:      General: No focal deficit present.      Mental Status: He is alert and oriented to person, place, and time.   Psychiatric:         Mood and Affect: Mood normal.         Results  No results found for: \"PSA\"  Lab Results   Component Value Date    CALCIUM 9.2 05/02/2022     12/16/2017    K 3.5 05/02/2022    CO2 32 05/02/2022    CL 99 (L) 05/02/2022    BUN 14 05/02/2022    CREATININE 1.62 (H) 05/02/2022     Lab Results   Component Value Date    WBC 5.50 08/13/2021    HGB 16.1 08/13/2021    HCT 46.7 08/13/2021    MCV 87 08/13/2021     08/13/2021       Office Urine Dip  No results found for this or any previous visit (from the past 1 hour(s)).  ]    "

## 2024-05-16 NOTE — ASSESSMENT & PLAN NOTE
Right testicular mass  Firm right nodular testicle, non-tender, no signs of infection on exam  Tumor markers negative  Scrotal sonogram: 4.8 cm hypoechoic, hypervascular, lobulated mass in right testicle with calcifications concerning for neoplasm    Scheduled for right inguinal radical orchiectomy on 5/24/2024.  Discussed risks of procedure including bleeding, infection, chronic inguinal pain from nerve entrapment and subsequent development of inguinal hernia.  Patient understands the risks and wishes to proceed with surgery.    Also discussed sperm banking with patient prior to treatment for testicular cancer    Will need CT chest abdomen pelvis for staging purposes

## 2024-05-16 NOTE — H&P (VIEW-ONLY)
Radha lee she is already like 5/16/2024    Ron Franco  1990  [id number]    1. Testicular mass  Assessment & Plan:  Right testicular mass  Firm right nodular testicle, non-tender, no signs of infection on exam  Tumor markers negative  Scrotal sonogram: 4.8 cm hypoechoic, hypervascular, lobulated mass in right testicle with calcifications concerning for neoplasm    Scheduled for right inguinal radical orchiectomy on 5/24/2024.  Discussed risks of procedure including bleeding, infection, chronic inguinal pain from nerve entrapment and subsequent development of inguinal hernia.  Patient understands the risks and wishes to proceed with surgery.    Also discussed sperm banking with patient prior to treatment for testicular cancer    Will need CT chest abdomen pelvis for staging purposes  Orders:  -     Case request operating room: ORCHIECTOMY INGUINAL; Standing  -     Case request operating room: ORCHIECTOMY INGUINAL  -     CT chest w contrast; Future; Expected date: 05/16/2024  -     CT abdomen pelvis w contrast; Future; Expected date: 05/16/2024  -     Basic metabolic panel; Future       History of Present Illness    PRIOR HPI  Office visit 5/6/2024  34 y.o. male with a history of Meniere's disease and right nephrectomy in high school due to non-functioning kidney from undiagnosed reflux who presents with scrotal pain    Reports right scrotal pain that started about 3 weeks ago with associated swelling  No associated erythema  No associated fevers/chills  Has had several episodes of associated nausea/mild vomiting in the morning  Testicle is not tender to palpation  No associated flank pain  No associated lower urinary tract symptoms    History of right inguinal hernia repair at age 4  Unsure if he has any history of undescended testicle    Interval History 05/16/24     Testicular tumor markers normal  Scrotal ultrasound was significant for a 4.8 cm hypervascular hyperechoic mass in the right  testicle    Patient here to discuss treatment recommendations for right testicular tumor.  No new complaints at this time      Review of Systems   Constitutional:  Negative for chills and fever.   HENT:  Negative for ear pain and sore throat.    Eyes:  Negative for pain and visual disturbance.   Respiratory:  Negative for cough and shortness of breath.    Cardiovascular:  Negative for chest pain and palpitations.   Gastrointestinal:  Negative for abdominal pain and vomiting.   Genitourinary:  Negative for dysuria and hematuria.   Musculoskeletal:  Negative for arthralgias and back pain.   Skin:  Negative for color change and rash.   Neurological:  Negative for seizures and syncope.   All other systems reviewed and are negative.      Past Medical History  Past Medical History:   Diagnosis Date    Hypertension     Meniere's disease     left sided    Vertigo        Past Social History  Past Surgical History:   Procedure Laterality Date    HERNIA REPAIR      NASAL SINUS SURGERY Left 2020    NEPHRECTOMY Right        Past Family History  Family History   Problem Relation Age of Onset    Hypertension Mother     Heart disease Father         cardiac pacemaker    Hypertension Father        Past Social history  Social History     Socioeconomic History    Marital status: /Civil Union     Spouse name: Not on file    Number of children: Not on file    Years of education: Not on file    Highest education level: Not on file   Occupational History    Not on file   Tobacco Use    Smoking status: Former     Current packs/day: 0.00     Types: Cigarettes     Quit date: 2018     Years since quittin.7     Passive exposure: Past    Smokeless tobacco: Never   Vaping Use    Vaping status: Never Used   Substance and Sexual Activity    Alcohol use: Not Currently     Comment: social     Drug use: Yes     Types: Marijuana    Sexual activity: Not on file   Other Topics Concern    Not on file   Social History Narrative    Denied:  Always uses seat belt    Caffeine use: one 16 oz cups of coffee, 1 cup of soda    Does not exercise     Social Determinants of Health     Financial Resource Strain: Not on file   Food Insecurity: Not on file   Transportation Needs: Not on file   Physical Activity: Not on file   Stress: Not on file   Social Connections: Not on file   Intimate Partner Violence: Not on file   Housing Stability: Not on file       Current Medications  Current Outpatient Medications   Medication Sig Dispense Refill    Apple Cider Vinegar 188 MG CAPS Take by mouth      Cholecalciferol (Vitamin D3) 125 MCG (5000 UT) TABS Take 5,000 Units by mouth daily      magnesium 30 MG tablet Take 30 mg by mouth 2 (two) times a day      NON FORMULARY Mometasone nasal irrigiation      NON FORMULARY 2 (two) times a day nurpirocin nasal irrigation      NON FORMULARY daily Vitamin B5 25mg      Omega-3 Fatty Acids (Fish Oil) 300 MG CAPS Take by mouth      Pyridoxine HCl (VITAMIN B6 PO) Take by mouth      Turmeric (QC TUMERIC COMPLEX PO) Take by mouth      esomeprazole (NexIUM) 20 mg capsule Take 20 mg by mouth every morning before breakfast (Patient not taking: Reported on 5/16/2024)      meclizine (ANTIVERT) 25 mg tablet Take 1 tablet (25 mg total) by mouth every 8 (eight) hours as needed for dizziness (Patient not taking: Reported on 5/16/2024) 30 tablet 0    meloxicam (Mobic) 15 mg tablet Take 1 tablet (15 mg total) by mouth daily (Patient not taking: Reported on 5/16/2024) 30 tablet 1    Mupirocin POWD Use Add content of one cap to 240 ml saline. Irrigate each nostril with 120 ml of medicated saline BID (Patient not taking: Reported on 5/16/2024)      triamterene-hydrochlorothiazide (DYAZIDE) 37.5-25 mg per capsule Take 1 capsule by mouth every morning (Patient not taking: Reported on 5/16/2024) 90 capsule 0     No current facility-administered medications for this visit.       Allergies  No Known Allergies    Past Medical History, Social History, Family  "History, medications and allergies were reviewed.    Vitals  Vitals:    05/16/24 0957   BP: 118/78   BP Location: Left arm   Patient Position: Sitting   Cuff Size: Adult   Pulse: 94   SpO2: 96%   Weight: 78.4 kg (172 lb 12.8 oz)   Height: 5' 7\" (1.702 m)       Physical Exam  Constitutional:       Appearance: Normal appearance. He is normal weight.   HENT:      Head: Normocephalic.      Nose: Nose normal. No congestion.   Eyes:      Conjunctiva/sclera: Conjunctivae normal.   Cardiovascular:      Rate and Rhythm: Normal rate.   Pulmonary:      Effort: Pulmonary effort is normal. No respiratory distress.   Abdominal:      General: Abdomen is flat. There is no distension.      Palpations: Abdomen is soft.   Skin:     General: Skin is warm and dry.   Neurological:      General: No focal deficit present.      Mental Status: He is alert and oriented to person, place, and time.   Psychiatric:         Mood and Affect: Mood normal.         Results  No results found for: \"PSA\"  Lab Results   Component Value Date    CALCIUM 9.2 05/02/2022     12/16/2017    K 3.5 05/02/2022    CO2 32 05/02/2022    CL 99 (L) 05/02/2022    BUN 14 05/02/2022    CREATININE 1.62 (H) 05/02/2022     Lab Results   Component Value Date    WBC 5.50 08/13/2021    HGB 16.1 08/13/2021    HCT 46.7 08/13/2021    MCV 87 08/13/2021     08/13/2021       Office Urine Dip  No results found for this or any previous visit (from the past 1 hour(s)).  ]    "

## 2024-05-16 NOTE — TELEPHONE ENCOUNTER
Dr. Hamm brought patient into my office to schedule surgery. He asked patient if he would do 5/24 - patient confirmed surgery date of 5/24 @ [facility] with Dr. Hamm.     Type of surgery: Right Orchiectomy   Operating physician: Dr. Hamm  Location of surgery: [facility]     Verbally went over prep with patient on:   NPO  Bowel prep? No  Hospital calls afternoon prior with arrival time -Calls Friday afternoon for Monday surgeries  Patient needs ride to and from surgery (outpatient)   Pre-op testing to be done 2 weeks prior to surgery  Saw that Dr. Hamm already ordered BMP, added CBC as well (no EKG - he is under 59 yrs. - if needed can be done on admit)  Blood thinners:   No majors - let them know PAT will call prior to surgery to go over medications  Clearances needed: None     Handed to patient on: 5/16  Soap / Bowel prep in packet  Post-op in packet  Post-op made in office for 6/11 @ 3pm    Consent: in Media

## 2024-05-21 ENCOUNTER — APPOINTMENT (OUTPATIENT)
Dept: LAB | Facility: HOSPITAL | Age: 34
End: 2024-05-21
Payer: COMMERCIAL

## 2024-05-21 DIAGNOSIS — N50.89 TESTICULAR MASS: ICD-10-CM

## 2024-05-21 DIAGNOSIS — Z01.812 PRE-OPERATIVE LABORATORY EXAMINATION: ICD-10-CM

## 2024-05-21 DIAGNOSIS — R39.89 SUSPECTED UTI: ICD-10-CM

## 2024-05-21 LAB
ANION GAP SERPL CALCULATED.3IONS-SCNC: 7 MMOL/L (ref 4–13)
BASOPHILS # BLD AUTO: 0.02 THOUSANDS/ÂΜL (ref 0–0.1)
BASOPHILS NFR BLD AUTO: 0 % (ref 0–1)
BUN SERPL-MCNC: 19 MG/DL (ref 5–25)
CALCIUM SERPL-MCNC: 9.3 MG/DL (ref 8.4–10.2)
CHLORIDE SERPL-SCNC: 99 MMOL/L (ref 96–108)
CO2 SERPL-SCNC: 31 MMOL/L (ref 21–32)
CREAT SERPL-MCNC: 1.52 MG/DL (ref 0.6–1.3)
EOSINOPHIL # BLD AUTO: 0.11 THOUSAND/ÂΜL (ref 0–0.61)
EOSINOPHIL NFR BLD AUTO: 2 % (ref 0–6)
ERYTHROCYTE [DISTWIDTH] IN BLOOD BY AUTOMATED COUNT: 11.7 % (ref 11.6–15.1)
GFR SERPL CREATININE-BSD FRML MDRD: 58 ML/MIN/1.73SQ M
GLUCOSE SERPL-MCNC: 90 MG/DL (ref 65–140)
HCT VFR BLD AUTO: 46 % (ref 36.5–49.3)
HGB BLD-MCNC: 16 G/DL (ref 12–17)
IMM GRANULOCYTES # BLD AUTO: 0.01 THOUSAND/UL (ref 0–0.2)
IMM GRANULOCYTES NFR BLD AUTO: 0 % (ref 0–2)
LYMPHOCYTES # BLD AUTO: 2.07 THOUSANDS/ÂΜL (ref 0.6–4.47)
LYMPHOCYTES NFR BLD AUTO: 32 % (ref 14–44)
MCH RBC QN AUTO: 29.9 PG (ref 26.8–34.3)
MCHC RBC AUTO-ENTMCNC: 34.8 G/DL (ref 31.4–37.4)
MCV RBC AUTO: 86 FL (ref 82–98)
MONOCYTES # BLD AUTO: 0.57 THOUSAND/ÂΜL (ref 0.17–1.22)
MONOCYTES NFR BLD AUTO: 9 % (ref 4–12)
NEUTROPHILS # BLD AUTO: 3.72 THOUSANDS/ÂΜL (ref 1.85–7.62)
NEUTS SEG NFR BLD AUTO: 57 % (ref 43–75)
NRBC BLD AUTO-RTO: 0 /100 WBCS
PLATELET # BLD AUTO: 192 THOUSANDS/UL (ref 149–390)
PMV BLD AUTO: 10.2 FL (ref 8.9–12.7)
POTASSIUM SERPL-SCNC: 3.9 MMOL/L (ref 3.5–5.3)
RBC # BLD AUTO: 5.35 MILLION/UL (ref 3.88–5.62)
SODIUM SERPL-SCNC: 137 MMOL/L (ref 135–147)
WBC # BLD AUTO: 6.5 THOUSAND/UL (ref 4.31–10.16)

## 2024-05-21 PROCEDURE — 36415 COLL VENOUS BLD VENIPUNCTURE: CPT

## 2024-05-21 PROCEDURE — 80048 BASIC METABOLIC PNL TOTAL CA: CPT

## 2024-05-21 PROCEDURE — 85025 COMPLETE CBC W/AUTO DIFF WBC: CPT

## 2024-05-22 ENCOUNTER — ANESTHESIA EVENT (OUTPATIENT)
Dept: PERIOP | Facility: HOSPITAL | Age: 34
End: 2024-05-22
Payer: COMMERCIAL

## 2024-05-23 NOTE — ANESTHESIA PREPROCEDURE EVALUATION
Procedure:  ORCHIECTOMY INGUINAL (Right: Groin)    Relevant Problems   ANESTHESIA   (+) PONV (postoperative nausea and vomiting)      CARDIO   (+) HTN (hypertension)   (+) Hyperlipidemia      GI/HEPATIC   (+) Esophageal reflux      /RENAL   (+) CKD (chronic kidney disease), stage III      MUSCULOSKELETAL   (+) Gout      NEURO/PSYCH  Daily marijunana use       PULMONARY (within normal limits)  Daily marijuana smoking       Other   (+) Testicular mass   (+) Tinnitus, left ear        Physical Exam    Airway    Mallampati score: II         Dental   Comment: #9 capped     Cardiovascular  Cardiovascular exam normal    Pulmonary  Pulmonary exam normal Breath sounds clear to auscultation    Other Findings        Anesthesia Plan  ASA Score- 2     Anesthesia Type- general with ASA Monitors.         Additional Monitors:     Airway Plan: LMA.           Plan Factors-    Chart reviewed.   Existing labs reviewed. Patient summary reviewed.    Patient is a current smoker.  Patient instructed to abstain from smoking on day of procedure.             Induction- intravenous.    Postoperative Plan-         Informed Consent- Anesthetic plan and risks discussed with patient.

## 2024-05-24 ENCOUNTER — ANESTHESIA (OUTPATIENT)
Dept: PERIOP | Facility: HOSPITAL | Age: 34
End: 2024-05-24
Payer: COMMERCIAL

## 2024-05-24 ENCOUNTER — HOSPITAL ENCOUNTER (OUTPATIENT)
Facility: HOSPITAL | Age: 34
Setting detail: OUTPATIENT SURGERY
Discharge: HOME | End: 2024-05-24
Attending: UROLOGY | Admitting: UROLOGY
Payer: COMMERCIAL

## 2024-05-24 ENCOUNTER — TELEPHONE (OUTPATIENT)
Dept: UROLOGY | Facility: MEDICAL CENTER | Age: 34
End: 2024-05-24

## 2024-05-24 VITALS
HEART RATE: 56 BPM | OXYGEN SATURATION: 98 % | SYSTOLIC BLOOD PRESSURE: 130 MMHG | RESPIRATION RATE: 18 BRPM | BODY MASS INDEX: 26.69 KG/M2 | DIASTOLIC BLOOD PRESSURE: 85 MMHG | WEIGHT: 170.42 LBS | TEMPERATURE: 96.8 F

## 2024-05-24 DIAGNOSIS — N50.89 TESTICULAR MASS: Primary | ICD-10-CM

## 2024-05-24 PROBLEM — Z98.890 PONV (POSTOPERATIVE NAUSEA AND VOMITING): Status: RESOLVED | Noted: 2024-05-24 | Resolved: 2024-05-24

## 2024-05-24 PROBLEM — R11.2 PONV (POSTOPERATIVE NAUSEA AND VOMITING): Status: ACTIVE | Noted: 2024-05-24

## 2024-05-24 PROBLEM — Z98.890 PONV (POSTOPERATIVE NAUSEA AND VOMITING): Status: ACTIVE | Noted: 2024-05-24

## 2024-05-24 PROBLEM — R11.2 PONV (POSTOPERATIVE NAUSEA AND VOMITING): Status: RESOLVED | Noted: 2024-05-24 | Resolved: 2024-05-24

## 2024-05-24 PROCEDURE — 54530 REMOVAL OF TESTIS: CPT | Performed by: UROLOGY

## 2024-05-24 PROCEDURE — 54530 REMOVAL OF TESTIS: CPT | Performed by: PHYSICIAN ASSISTANT

## 2024-05-24 PROCEDURE — 88342 IMHCHEM/IMCYTCHM 1ST ANTB: CPT | Performed by: PATHOLOGY

## 2024-05-24 PROCEDURE — 88309 TISSUE EXAM BY PATHOLOGIST: CPT | Performed by: PATHOLOGY

## 2024-05-24 PROCEDURE — 88341 IMHCHEM/IMCYTCHM EA ADD ANTB: CPT | Performed by: PATHOLOGY

## 2024-05-24 RX ORDER — FENTANYL CITRATE/PF 50 MCG/ML
25 SYRINGE (ML) INJECTION
Status: COMPLETED | OUTPATIENT
Start: 2024-05-24 | End: 2024-05-24

## 2024-05-24 RX ORDER — OXYCODONE HYDROCHLORIDE AND ACETAMINOPHEN 5; 325 MG/1; MG/1
1 TABLET ORAL EVERY 4 HOURS PRN
Qty: 10 TABLET | Refills: 0 | Status: SHIPPED | OUTPATIENT
Start: 2024-05-24 | End: 2024-05-27

## 2024-05-24 RX ORDER — ONDANSETRON 2 MG/ML
INJECTION INTRAMUSCULAR; INTRAVENOUS AS NEEDED
Status: DISCONTINUED | OUTPATIENT
Start: 2024-05-24 | End: 2024-05-24

## 2024-05-24 RX ORDER — PROPOFOL 10 MG/ML
INJECTION, EMULSION INTRAVENOUS CONTINUOUS PRN
Status: DISCONTINUED | OUTPATIENT
Start: 2024-05-24 | End: 2024-05-24

## 2024-05-24 RX ORDER — OXYCODONE HYDROCHLORIDE AND ACETAMINOPHEN 5; 325 MG/1; MG/1
1 TABLET ORAL EVERY 4 HOURS PRN
Status: DISCONTINUED | OUTPATIENT
Start: 2024-05-24 | End: 2024-05-24 | Stop reason: HOSPADM

## 2024-05-24 RX ORDER — HYDROMORPHONE HCL/PF 1 MG/ML
0.5 SYRINGE (ML) INJECTION
Status: DISCONTINUED | OUTPATIENT
Start: 2024-05-24 | End: 2024-05-24 | Stop reason: HOSPADM

## 2024-05-24 RX ORDER — MEPERIDINE HYDROCHLORIDE 25 MG/ML
12.5 INJECTION INTRAMUSCULAR; INTRAVENOUS; SUBCUTANEOUS
Status: DISCONTINUED | OUTPATIENT
Start: 2024-05-24 | End: 2024-05-24 | Stop reason: HOSPADM

## 2024-05-24 RX ORDER — OXYCODONE HYDROCHLORIDE AND ACETAMINOPHEN 5; 325 MG/1; MG/1
1 TABLET ORAL ONCE
Status: COMPLETED | OUTPATIENT
Start: 2024-05-24 | End: 2024-05-24

## 2024-05-24 RX ORDER — MIDAZOLAM HYDROCHLORIDE 2 MG/2ML
INJECTION, SOLUTION INTRAMUSCULAR; INTRAVENOUS AS NEEDED
Status: DISCONTINUED | OUTPATIENT
Start: 2024-05-24 | End: 2024-05-24

## 2024-05-24 RX ORDER — PROPOFOL 10 MG/ML
INJECTION, EMULSION INTRAVENOUS AS NEEDED
Status: DISCONTINUED | OUTPATIENT
Start: 2024-05-24 | End: 2024-05-24

## 2024-05-24 RX ORDER — CEFAZOLIN SODIUM 2 G/50ML
2000 SOLUTION INTRAVENOUS ONCE
Status: COMPLETED | OUTPATIENT
Start: 2024-05-24 | End: 2024-05-24

## 2024-05-24 RX ORDER — FENTANYL CITRATE 50 UG/ML
INJECTION, SOLUTION INTRAMUSCULAR; INTRAVENOUS AS NEEDED
Status: DISCONTINUED | OUTPATIENT
Start: 2024-05-24 | End: 2024-05-24

## 2024-05-24 RX ORDER — DEXAMETHASONE SODIUM PHOSPHATE 10 MG/ML
INJECTION, SOLUTION INTRAMUSCULAR; INTRAVENOUS AS NEEDED
Status: DISCONTINUED | OUTPATIENT
Start: 2024-05-24 | End: 2024-05-24

## 2024-05-24 RX ORDER — LIDOCAINE HYDROCHLORIDE 20 MG/ML
INJECTION, SOLUTION EPIDURAL; INFILTRATION; INTRACAUDAL; PERINEURAL AS NEEDED
Status: DISCONTINUED | OUTPATIENT
Start: 2024-05-24 | End: 2024-05-24

## 2024-05-24 RX ORDER — LIDOCAINE HYDROCHLORIDE 10 MG/ML
INJECTION, SOLUTION EPIDURAL; INFILTRATION; INTRACAUDAL; PERINEURAL AS NEEDED
Status: DISCONTINUED | OUTPATIENT
Start: 2024-05-24 | End: 2024-05-24 | Stop reason: HOSPADM

## 2024-05-24 RX ORDER — ONDANSETRON 2 MG/ML
4 INJECTION INTRAMUSCULAR; INTRAVENOUS ONCE AS NEEDED
Status: DISCONTINUED | OUTPATIENT
Start: 2024-05-24 | End: 2024-05-24 | Stop reason: HOSPADM

## 2024-05-24 RX ORDER — SODIUM CHLORIDE, SODIUM LACTATE, POTASSIUM CHLORIDE, CALCIUM CHLORIDE 600; 310; 30; 20 MG/100ML; MG/100ML; MG/100ML; MG/100ML
125 INJECTION, SOLUTION INTRAVENOUS CONTINUOUS
Status: DISCONTINUED | OUTPATIENT
Start: 2024-05-24 | End: 2024-05-24 | Stop reason: HOSPADM

## 2024-05-24 RX ADMIN — FENTANYL CITRATE 50 MCG: 50 INJECTION INTRAMUSCULAR; INTRAVENOUS at 07:48

## 2024-05-24 RX ADMIN — PROPOFOL 120 MCG/KG/MIN: 10 INJECTION, EMULSION INTRAVENOUS at 07:30

## 2024-05-24 RX ADMIN — FENTANYL CITRATE 25 MCG: 50 INJECTION INTRAMUSCULAR; INTRAVENOUS at 07:37

## 2024-05-24 RX ADMIN — SODIUM CHLORIDE, SODIUM LACTATE, POTASSIUM CHLORIDE, AND CALCIUM CHLORIDE 125 ML/HR: .6; .31; .03; .02 INJECTION, SOLUTION INTRAVENOUS at 05:36

## 2024-05-24 RX ADMIN — SODIUM CHLORIDE, SODIUM LACTATE, POTASSIUM CHLORIDE, AND CALCIUM CHLORIDE: .6; .31; .03; .02 INJECTION, SOLUTION INTRAVENOUS at 07:58

## 2024-05-24 RX ADMIN — DEXAMETHASONE SODIUM PHOSPHATE 10 MG: 10 INJECTION INTRAMUSCULAR; INTRAVENOUS at 07:36

## 2024-05-24 RX ADMIN — FENTANYL CITRATE 25 MCG: 50 INJECTION, SOLUTION INTRAMUSCULAR; INTRAVENOUS at 09:45

## 2024-05-24 RX ADMIN — OXYCODONE HYDROCHLORIDE AND ACETAMINOPHEN 1 TABLET: 5; 325 TABLET ORAL at 10:28

## 2024-05-24 RX ADMIN — FENTANYL CITRATE 25 MCG: 50 INJECTION, SOLUTION INTRAMUSCULAR; INTRAVENOUS at 09:49

## 2024-05-24 RX ADMIN — PROPOFOL 200 MG: 10 INJECTION, EMULSION INTRAVENOUS at 07:30

## 2024-05-24 RX ADMIN — FENTANYL CITRATE 25 MCG: 50 INJECTION, SOLUTION INTRAMUSCULAR; INTRAVENOUS at 09:38

## 2024-05-24 RX ADMIN — FENTANYL CITRATE 25 MCG: 50 INJECTION, SOLUTION INTRAMUSCULAR; INTRAVENOUS at 09:32

## 2024-05-24 RX ADMIN — MIDAZOLAM 2 MG: 1 INJECTION INTRAMUSCULAR; INTRAVENOUS at 07:28

## 2024-05-24 RX ADMIN — ONDANSETRON 4 MG: 2 INJECTION INTRAMUSCULAR; INTRAVENOUS at 07:36

## 2024-05-24 RX ADMIN — FENTANYL CITRATE 25 MCG: 50 INJECTION INTRAMUSCULAR; INTRAVENOUS at 07:30

## 2024-05-24 RX ADMIN — CEFAZOLIN SODIUM 2000 MG: 2 SOLUTION INTRAVENOUS at 07:30

## 2024-05-24 RX ADMIN — LIDOCAINE HYDROCHLORIDE 100 MG: 20 INJECTION, SOLUTION EPIDURAL; INFILTRATION; INTRACAUDAL at 07:30

## 2024-05-24 NOTE — TELEPHONE ENCOUNTER
Right inguinal radical orchiectomy today.  Patient to follow up in 2 weeks for pathology results.  Instructed no heavy lifting x 6 weeks and no tub baths or swimming for 2 weeks.

## 2024-05-24 NOTE — ANESTHESIA POSTPROCEDURE EVALUATION
Post-Op Assessment Note    CV Status:  Stable    Pain management: adequate       Mental Status:  Alert and awake   Hydration Status:  Euvolemic   PONV Controlled:  Controlled   Airway Patency:  Patent     Post Op Vitals Reviewed: Yes    No anethesia notable event occurred.    Staff: Anesthesiologist               BP      Temp      Pulse     Resp      SpO2      /68   Pulse 59   Temp (!) 97.3 °F (36.3 °C) (Temporal)   Resp 16   Wt 77.3 kg (170 lb 6.7 oz)   SpO2 98%   BMI 26.69 kg/m²

## 2024-05-24 NOTE — DISCHARGE INSTR - AVS FIRST PAGE
No heavy lifting over 10 lbs for 6 weeks  Scrotal support x 2 weeks  No tub baths or swimming in pool for 2 weeks    Follow up for pathology results in 2 weeks

## 2024-05-24 NOTE — INTERVAL H&P NOTE
H&P reviewed. After examining the patient I find no changes in the patients condition since the H&P had been written.    Vitals:    05/24/24 0545   BP: 115/76   Pulse: 57   Resp: 16   Temp: (!) 97.3 °F (36.3 °C)   SpO2: 97%

## 2024-05-24 NOTE — OP NOTE
Operative Note       PRE-OP DIAGNOSES:   1) Right testicular mass     POST-OP DIAGNOSES AND OPERATIVE FINDINGS:   1) Right testicular mass    PROCEDURES:  1) Right radical inguinal orchiectomy    SURGEON:   Nathan Hamm MD    ANESTHESIA TYPE:  General anesthesia and local anesthetic block    ESTIMATED BLOOD LOSS:   15 mL    COMPLICATIONS:   None    ANTIBIOTICS:  Ancef 2 g IV    INTRAOPERATIVE THROMBOEMBOLISM PROPHYLAXIS:  Pneumatic compression stockings        PROCEDURE SUMMARY:    On an outpatient basis the patient's urological workup revealed a right testicular tumor.  The management of testicular tumors including radical inguinal orchiectomy was discussed with the patient. All risks benefits and alternatives of this procedure were discussed with the patient as well.  Informed consent for the proposed procedure was obtained and medical optimization was also obtained for this patient.    The patient was brought to the operating room and placed in the supine position for the induction of general anesthesia.  A full time-out confirmed the proper patient, position, and procedure.  The patient was then placed in the supine position and prepped and draped using the standard sterile technique.  All pressure points were carefully padded there was no undue pressure on any bony prominences, muscle bellies, or nervous structures..  Prior to the procedure antibiotics were administered as per the guidelines, and thromboembolism prophylaxis was administered in the form of sequential compression devices.   A full time-out confirmed the proper patient, position, procedure, and laterality.    Exam under anesthesia showed: firm right testicular mass    We began by inserting a finger into the ipsilateral scrotum to the level of the ipsilateral external inguinal ring.  The area overlying this landmark was marked with a surgical marking pen indicating a incision of appropriate size for removal of the testicular mass.  A 10 blade  was then used to incise the epidermis and the dermis after which Bovie electrocautery was used to dissect through the subcutaneous tissues to the level of the external oblique fascia.  The external oblique fascia was scarred from prior right inguinal hernia surgery.  The spermatic cord was identified and gently grasped with a Tj clamp after which time it was dissected free of its surrounding attachments and a Penrose drain 1 quarter-inch was used to isolate it from the surrounding tissue.    The ipsilateral testicle was then delivered through the ipsilateral scrotum into the surgical field and a moist Ray-Haile sponge was used to retract the testis while the gubernacular attachments were divided.  Prior to this manipulation the proximal portion of the spermatic cord was placed under tourniquet pressure via a double wrap of the quarter-inch Penrose drain.    The external inguinal ring was then opened along the fibers of the external oblique muscle with a sliding motion of the Metzenbaum scissors to the level of the internal inguinal ring.  Arm[employer] retractors were used to peer into the internal inguinal ring and dissect the spermatic cord as proximally as possible.  This was divided between Farheen clamps taking care to avoid spillage of spermatic cord contents and not to violate the testicle or testicular mass.  The proximal portion of the spermatic cord was doubly ligated with a 0 silk stitch leaving long tails in the event that a future retroperitoneal lymph node dissection will be necessary.  A suture ligature of the same suture was also placed and the proximal clamp was flashed to ensure hemostasis prior to releasing the proximal cord remnant.    The surgical field was copiously irrigated with sterile water.  A 2 0 Vicryl stitch was then used to close the aponeurosis of the external oblique muscle us reconstructing the inguinal canal.  Meticulous hemostasis was achieved and local anesthetic block was performed  via performing a ilioinguinal nerve block and a field block around the inguinal incision and along the course of the spermatic cord.    The wound was again inspected, all instrument counts and sponge counts were correct.  The subcutaneous tissues were then closed with a 2-0 Vicryl stitch to eliminate dead space.  The skin edge was closed with a 4-0 Monocryl stitch and the skin edge was sealed with skin glue.      DISPOSITION:  PACU to home.  .  Plan:  The patient will be discharged home and will avoid heavy lifting for 6 weeks while wearing tight-fitting underwear for scrotal support.   Our office will arrange follow up with me in 2 weeks to discuss his pathology and to arrange for appropriate follow-up as per the NCCN guidelines.

## 2024-05-28 NOTE — TELEPHONE ENCOUNTER
LVM for pt asking him to give the office a call back to see how he is doing s/p surgery on 5.24. Office number provided. Pt was told to ask for Anne TORRES.

## 2024-05-28 NOTE — TELEPHONE ENCOUNTER
Pt returned call to the RN.  Pt sts that he is feeling ok.  Pt sts that he has some soreness/pain, but that's all.  No other symptoms.    Pt call back: [phone]

## 2024-05-28 NOTE — TELEPHONE ENCOUNTER
Post Op Note    Ron Franco is a 34 y.o. male s/p ORCHIECTOMY INGUINAL (Right: Groin) performed 5/24/24.  Ron Franco is a patient of Dr. Hamm and is seen at the [place] office.     How would you rate your pain on a scale from 1 to 10, 10 being the worst pain ever? 2  Have you had a fever? No (see below)  Have your bowel movements been regular? Yes  Do you have any difficulty urinating? No  If the patient has an incision- do you have any redness around the incision or any drainage from the incision? No    Do you have any other questions or concerns that I can address at this time? Post-op call placed to patient. Patient he is feeling okay despite some soreness. Patient stated he did have a migraine two nights ago, with sweats but feels it was related to his Meniere's disease. He feels it was not related to his surgery. He denies any urinating problems and states his urine is clear - yellow. He had 3-5 bowel movements with no straining. Patient states he has no redness at incision site but does notice some bruising, which he was advised this is normal. Pt is taking tylenol as needed for discomfort and Percocet at bedtime.     Pt was advised he would need a 2-week follow up appointment with Dr. Hamm for pathology review. Pt has appt on 6/11/24 at 3 pm.     *Pt was also advised No heavy lifting for 6-weeks and No tub baths or swimming for 2-weeks. Pt verbalized his understanding.

## 2024-06-03 PROCEDURE — 88309 TISSUE EXAM BY PATHOLOGIST: CPT | Performed by: PATHOLOGY

## 2024-06-03 PROCEDURE — 88341 IMHCHEM/IMCYTCHM EA ADD ANTB: CPT | Performed by: PATHOLOGY

## 2024-06-03 PROCEDURE — 88342 IMHCHEM/IMCYTCHM 1ST ANTB: CPT | Performed by: PATHOLOGY

## 2024-06-04 ENCOUNTER — TELEPHONE (OUTPATIENT)
Dept: UROLOGY | Facility: MEDICAL CENTER | Age: 34
End: 2024-06-04

## 2024-06-04 NOTE — TELEPHONE ENCOUNTER
LVM for pt asking him when he will be scheduling the ct scan of abdomen and pelvis and ct scan of chest. The number for central scheduling was provided. Pt was told to call the office and let us know when he would be having these tests done, so that we could monitor for the results. Office call back number provided.

## 2024-06-05 ENCOUNTER — TELEPHONE (OUTPATIENT)
Dept: UROLOGY | Facility: MEDICAL CENTER | Age: 34
End: 2024-06-05

## 2024-06-05 NOTE — TELEPHONE ENCOUNTER
Spoke with pt. He will be going for both tests. Ct scan abd &  pelvis is scheduled for 6/10/24. He will be scheduling ct scan of chest too.     Pt's follow up appt with Dr. Hamm is 6/11/24.    *Monitor for ct scan of chest appt.

## 2024-06-06 NOTE — TELEPHONE ENCOUNTER
Spoke with Aj from Radiology and told him we will need to  have ct chest and ct abdomen and pelvis done results in time for pt's visit with Dr. Hamm on 6/11/24 at 3pm.     Aj states pt is having tests at [place] and will make a note that results are needed by this time. *Aj ensured the tests would be available for pt's appt.

## 2024-06-10 ENCOUNTER — HOSPITAL ENCOUNTER (OUTPATIENT)
Dept: CT IMAGING | Facility: HOSPITAL | Age: 34
Discharge: HOME | End: 2024-06-10
Attending: UROLOGY
Payer: COMMERCIAL

## 2024-06-10 ENCOUNTER — TELEPHONE (OUTPATIENT)
Age: 34
End: 2024-06-10

## 2024-06-10 DIAGNOSIS — N50.89 TESTICULAR MASS: ICD-10-CM

## 2024-06-10 PROCEDURE — 71260 CT THORAX DX C+: CPT

## 2024-06-10 PROCEDURE — 74177 CT ABD & PELVIS W/CONTRAST: CPT

## 2024-06-10 RX ADMIN — IOHEXOL 100 ML: 350 INJECTION, SOLUTION INTRAVENOUS at 08:15

## 2024-06-10 NOTE — TELEPHONE ENCOUNTER
Significant findings   Patient managed by Tam Hamm   Radiology Test results-Radiology calling with report update     CT chest abdomen pelvis w contrast    Please review imaging

## 2024-06-11 ENCOUNTER — OFFICE VISIT (OUTPATIENT)
Dept: UROLOGY | Facility: MEDICAL CENTER | Age: 34
End: 2024-06-11

## 2024-06-11 ENCOUNTER — TELEPHONE (OUTPATIENT)
Dept: UROLOGY | Facility: MEDICAL CENTER | Age: 34
End: 2024-06-11

## 2024-06-11 VITALS
BODY MASS INDEX: 26.97 KG/M2 | SYSTOLIC BLOOD PRESSURE: 130 MMHG | OXYGEN SATURATION: 97 % | HEIGHT: 67 IN | DIASTOLIC BLOOD PRESSURE: 82 MMHG | HEART RATE: 68 BPM | WEIGHT: 171.8 LBS

## 2024-06-11 DIAGNOSIS — C62.11 SEMINOMA OF DESCENDED RIGHT TESTIS (HCC): Primary | ICD-10-CM

## 2024-06-11 PROCEDURE — 99024 POSTOP FOLLOW-UP VISIT: CPT | Performed by: UROLOGY

## 2024-06-11 NOTE — PROGRESS NOTES
6/11/2024    Ron Franco  1990  [id number]    1. 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 male with a history of right testicular mass s/p right inguinal orchiectomy on 5/24/2024    Final pathology:    A.  Right testicle and spermatic cord (radical inguinal orchiectomy):     - Seminoma (4.5 cm).      - No definitive lymph-vascular invasion.     - Tunica vaginalis negative for tumor.     - Surgical margin negative for tumor.  - pT1b    Serum tumor markers WNL    CT chest/abdomen/pelvis  IMPRESSION:     Multiple pulmonary nodules. No prior study for comparison. Based on current Fleischner Society 2017 Guidelines on incidental pulmonary nodule, patients with a known malignancy are at increased risk of metastasis and should receive initial three-month   follow-up chest CT.     Mediastinal and bilateral hilar lymphadenopathy concerning for metastatic disease given the patient's history.     Cholelithiasis.     Status post right orchiectomy with expected postoperative changes.    Doing well postoperatively  Surgical incision healing well  No complaints at this time    Review of Systems   All other systems reviewed and are negative.      Past Medical History  Past Medical History:   Diagnosis Date    Hypertension     resolved    Meniere's disease     left sided    PONV (postoperative nausea and vomiting)     Single kidney     left    Vertigo        Past Social History  Past Surgical History:   Procedure Laterality Date    HERNIA REPAIR      NASAL SINUS SURGERY Left  2020    NEPHRECTOMY Right     DC ORCHIECTOMY RADICAL TUMOR INGUINAL APPROACH Right 2024    Procedure: ORCHIECTOMY INGUINAL;  Surgeon: Nathan Hamm MD;  Location: [facility];  Service: Urology       Past Family History  Family History   Problem Relation Age of Onset    Hypertension Mother     Heart disease Father         cardiac pacemaker    Hypertension Father        Past Social history  Social History     Socioeconomic History    Marital status: /Civil Union     Spouse name: Not on file    Number of children: Not on file    Years of education: Not on file    Highest education level: Not on file   Occupational History    Not on file   Tobacco Use    Smoking status: Former     Current packs/day: 0.00     Types: Cigarettes     Quit date: 2018     Years since quittin.7     Passive exposure: Past    Smokeless tobacco: Never   Vaping Use    Vaping status: Never Used   Substance and Sexual Activity    Alcohol use: Not Currently    Drug use: Yes     Frequency: 7.0 times per week     Types: Marijuana     Comment: vidhi 24    Sexual activity: Not on file   Other Topics Concern    Not on file   Social History Narrative    Denied: Always uses seat belt    Caffeine use: one 16 oz cups of coffee, 1 cup of soda    Does not exercise     Social Determinants of Health     Financial Resource Strain: Not on file   Food Insecurity: Not on file   Transportation Needs: Not on file   Physical Activity: Not on file   Stress: Not on file   Social Connections: Not on file   Intimate Partner Violence: Not on file   Housing Stability: Not on file       Current Medications  Current Outpatient Medications   Medication Sig Dispense Refill    Apple Cider Vinegar 188 MG CAPS Take by mouth      Cholecalciferol (Vitamin D3) 125 MCG (5000 UT) TABS Take 5,000 Units by mouth daily      magnesium 30 MG tablet Take 30 mg by mouth 2 (two) times a day      Omega-3 Fatty Acids (Fish Oil) 300 MG CAPS Take by mouth       "Pyridoxine HCl (VITAMIN B6 PO) Take by mouth      Turmeric (QC TUMERIC COMPLEX PO) Take by mouth      esomeprazole (NexIUM) 20 mg capsule Take 20 mg by mouth every morning before breakfast (Patient not taking: Reported on 5/16/2024)      meclizine (ANTIVERT) 25 mg tablet Take 1 tablet (25 mg total) by mouth every 8 (eight) hours as needed for dizziness (Patient not taking: Reported on 5/16/2024) 30 tablet 0    meloxicam (Mobic) 15 mg tablet Take 1 tablet (15 mg total) by mouth daily (Patient not taking: Reported on 5/16/2024) 30 tablet 1    Mupirocin POWD Use Add content of one cap to 240 ml saline. Irrigate each nostril with 120 ml of medicated saline BID (Patient not taking: Reported on 5/16/2024)      NON FORMULARY Mometasone nasal irrigiation (Patient not taking: Reported on 6/11/2024)      NON FORMULARY 2 (two) times a day nurpirocin nasal irrigation (Patient not taking: Reported on 6/11/2024)      NON FORMULARY daily Vitamin B5 25mg (Patient not taking: Reported on 6/11/2024)      triamterene-hydrochlorothiazide (DYAZIDE) 37.5-25 mg per capsule Take 1 capsule by mouth every morning (Patient not taking: Reported on 5/16/2024) 90 capsule 0     No current facility-administered medications for this visit.       Allergies  No Known Allergies    Past Medical History, Social History, Family History, medications and allergies were reviewed.    Vitals  Vitals:    06/11/24 1456   BP: 130/82   BP Location: Left arm   Patient Position: Sitting   Cuff Size: Adult   Pulse: 68   SpO2: 97%   Weight: 77.9 kg (171 lb 12.8 oz)   Height: 5' 7\" (1.702 m)       Physical Exam  Constitutional:       Appearance: Normal appearance. He is normal weight.   HENT:      Head: Normocephalic.      Nose: Nose normal. No congestion.   Eyes:      Conjunctiva/sclera: Conjunctivae normal.   Cardiovascular:      Rate and Rhythm: Normal rate.   Pulmonary:      Effort: Pulmonary effort is normal. No respiratory distress.   Abdominal:      General: " "Abdomen is flat. There is no distension.      Palpations: Abdomen is soft.      Tenderness: There is no right CVA tenderness or left CVA tenderness.      Comments: Right inguinal incision c/d/i   Musculoskeletal:      Comments: Normal gait   Skin:     General: Skin is warm and dry.   Neurological:      General: No focal deficit present.      Mental Status: He is alert and oriented to person, place, and time.   Psychiatric:         Mood and Affect: Mood normal.         Results  No results found for: \"PSA\"  Lab Results   Component Value Date    CALCIUM 9.3 05/21/2024     12/16/2017    K 3.9 05/21/2024    CO2 31 05/21/2024    CL 99 05/21/2024    BUN 19 05/21/2024    CREATININE 1.52 (H) 05/21/2024     Lab Results   Component Value Date    WBC 6.50 05/21/2024    HGB 16.0 05/21/2024    HCT 46.0 05/21/2024    MCV 86 05/21/2024     05/21/2024       Office Urine Dip  No results found for this or any previous visit (from the past 1 hour(s)).]    "

## 2024-06-11 NOTE — ASSESSMENT & PLAN NOTE
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

## 2024-06-12 ENCOUNTER — PATIENT OUTREACH (OUTPATIENT)
Dept: HEMATOLOGY ONCOLOGY | Facility: CLINIC | Age: 34
End: 2024-06-12

## 2024-06-12 NOTE — PROGRESS NOTES
Outreach made to patient. I LM introducing myself and explained my role. I let him know tat I received a message from urology office that he had further questions regarding Oncology appt and sperm banking. I asked patient to return my call to discuss.

## 2024-06-12 NOTE — TELEPHONE ENCOUNTER
Left voice message letting pt know that I was checking in to see if how he was doing and to let him know that Corin from Oncology will contact him sometime next week. Call back number provided in case pt had further questions/concerns.    You  Bernie Milligan MA; Nathan Hamm MD; Corin Hernandez, RN4 hours ago (9:20 AM)     [facility] Bernie, Dr. Hamm explained in detail everything to the pt twice but next steps with Oncology we are not able to give the pt exact timelines. He wanted to know when he will start chemo, as he will be doing sperm banking before. I will call the pt today to let him know that Corin will reach out to him next week. Thanks.

## 2024-06-13 ENCOUNTER — PATIENT OUTREACH (OUTPATIENT)
Dept: HEMATOLOGY ONCOLOGY | Facility: CLINIC | Age: 34
End: 2024-06-13

## 2024-06-13 NOTE — PROGRESS NOTES
Patient returned my call and stated that he has to go [facility] due to his insurance. He will be setting the sperm banking samples for himself prior to oncology appt.  We scheduled his Med Onc appt for 6/26/24 in [facility]. I let patient know I will be going to appointment on 6/26/24 for support. He was appreciative. He knows to outreach if he needs anything prior to appt.  He is aware that NN will be calling him to assess barriers to care and introduce herself.

## 2024-06-14 ENCOUNTER — PATIENT OUTREACH (OUTPATIENT)
Dept: HEMATOLOGY ONCOLOGY | Facility: CLINIC | Age: 34
End: 2024-06-14

## 2024-06-14 DIAGNOSIS — C62.11 SEMINOMA OF DESCENDED RIGHT TESTIS (HCC): Primary | ICD-10-CM

## 2024-06-14 NOTE — PROGRESS NOTES
New patient outreach for newly diagnosed testicular seminoma. Patient with sperm banking in process. Staging scan completed, med onc scheduled.     Follow up blood work to be placed, patient to have drawn prior to medical oncology consultation.   post-op labs (preferably ~2 weeks post op) of HCG, LDH, AFP, CBC w/diff and CMP

## 2024-06-17 ENCOUNTER — PATIENT OUTREACH (OUTPATIENT)
Dept: HEMATOLOGY ONCOLOGY | Facility: CLINIC | Age: 34
End: 2024-06-17

## 2024-06-17 NOTE — PROGRESS NOTES
Outreach to pt and left voicemail introducing myself and role as Nurse Navigator to assist with coordination of cancer care, preparation for upcoming appointment, be a point of contact prior to oncology consult,  provide support and connect with available resources.  Provided contact information, reviewed upcoming appts and requested call back.    Future Appointments   Date Time Provider Department Center   6/26/2024  2:30 PM Shreyas Amezcua MD [facility]

## 2024-06-19 ENCOUNTER — PATIENT OUTREACH (OUTPATIENT)
Dept: HEMATOLOGY ONCOLOGY | Facility: CLINIC | Age: 34
End: 2024-06-19

## 2024-06-19 DIAGNOSIS — C62.11 SEMINOMA OF DESCENDED RIGHT TESTIS (HCC): Primary | ICD-10-CM

## 2024-06-19 NOTE — PROGRESS NOTES
Patient returned call to NN.   Oncology Nurse Navigator made call to patient due to referral to provider within [facility]. I spoke with patient about my role as an Oncology Nurse Navigator. I explained how to reach the office for any routine or urgent matters and the availability of patient navigation for non urgent matters. Explained oncology navigation is here to help patients through their journey and to offer assistance with any barriers to care. As a team, we strive to be patient advocates and help navigate healthcare system. Patient aware that medical oncology nursing will be primary contact once consult is complete and patient navigator will continue to offer support through entire journey. Conversation is based on evidence based care to optimize patient outcomes. Emotional support provided throughout conversation.     Patient has many questions in regards to cancer treatments. Discussed pathology, NCCN guidelines and expectations for ability to work through treatments. Explained that oncologist will discuss treatment and staff will do teaching when patient is seen next week.     Patient reports no symptoms at this time. Healing well.     Evaluated for any barriers to care.   Genetic testing discussed referral placed  Smoking status verified patient smokes MMj regularly   Provided contact information for nurse navigator and patient navigator  Discussed use of My Chart    Answered questions to pt's satisfaction.  Reviewed upcoming appts. Provided support and provided direct contact information for any questions or concerns.       Future Appointments   Date Time Provider Department Center   6/26/2024  2:30 PM Shreyas Amezcua MD [facility]

## 2024-06-19 NOTE — PROGRESS NOTES
Outreach to pt and left voicemail introducing myself and role as Nurse Navigator to assist with coordination of cancer care, preparation for upcoming appointment, be a point of contact prior to oncology consult,  provide support and connect with available resources.  Provided contact information, reviewed upcoming appts and requested call back.    Future Appointments   Date Time Provider Department Center   6/26/2024  2:30 PM Shreyas Amezcua MD [facility]     
rolling walker

## 2024-06-23 NOTE — H&P (VIEW-ONLY)
Hematology/Oncology Outpatient Office Note    Date of Service: 2024    [facility] [address]  [facility]ly [facility], [address] [address]  [phone]    Reason for Consultation:   Chief Complaint   Patient presents with    Consult       Cancer Stage at diagnosis: IIIA    Referral Physician: Nathan Hamm MD    Primary Care Physician:  No primary care provider on file.     Nickname: Ron    Spouse: Bernie    Has a 4 month old son    Original ECO     Today's ECO    Goals and Barriers:  Current Goal: Minimize effects of disease burden, extend life.   Barriers to accomplishing this: None    Patient's Capacity to Self Care:  Patient is able to self care    ASSESSMENT & PLAN      Diagnosis ICD-10-CM Associated Orders   1. Seminoma of descended right testis (HCC)  C62.11 Ambulatory Referral to Hematology / Oncology            This is a 34 y.o. 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 I reviewed relevant office notes, imaging reports and pathology result as well.  I told the patient that this is a case of curable disease and what this means. We discussed that the goal of anti-cancer therapy is to provide best quality of life, extend overall survival, and progression free survival as shown in clinical trials.   I explained the risks/benefits of the proposed cancer therapy: Cisplatin + Etoposide and after discussion including understanding risks of possible life-threatening complications and  therapy-related malignancy development, informed consent for blood products and treatment has been signed and obtained.  TNM/Staging At Diagnosis  Cancer Staging   Seminoma of descended right testis (HCC)  Staging form: Testis, AJCC 8th Edition  - Pathologic stage from 5/24/2024: Stage Unknown (pT1b, cN2c, cM1a, S0) - Signed by Shreyas Amezcua MD on 6/23/2024  Stage prefix: Initial diagnosis  Residual tumor (R): R0 - None  [id number] (underlying LN)  Disease Features/Tumor Markers/Genetics  Tumor Marker: AFP, LDH, HCT WNL  6/25/2024: HCG, AFP, LDH WNL  Notable Path Features:   5/24/2024: Right testicle and spermatic cord (radical inguinal orchiectomy):     - Seminoma (4.5 cm).      - No definitive lymph-vascular invasion.     - Tunica vaginalis negative for tumor.     - Surgical margin negative for tumor  Treatment: Cisplatin + Etoposide   Other Supportive care:   Treatment Team Members  Surgeon: Dr. Hamm  Rad Onc  Palliative  Labs  Diagnostics  6/10/2024 CT CAP w/c: Multiple pulmonary nodules. No prior study for comparison. Based on current Fleischner Society 2017 Guidelines on incidental pulmonary nodule, patients with a known malignancy are at increased risk of metastasis and should receive initial three-month   follow-up chest CT. Mediastinal and bilateral hilar lymphadenopathy concerning for metastatic disease given the patient's history.  Bilateral hilar and mediastinal lymphadenopathy. Right hilar lymph node measures 2.9 x 2.1 cm. Subcarinal lymph node is 2.5 x 1.4 cm. Pretracheal lymph node is 2.2 x 1.1 cm. Left hilar lymph node is 1.3 x 1.1 cm.     Discussion of decision making    I personally reviewed the following lab results, the image studies, pathology, other specialty/physicians consult notes and recommendations, and outside medical records. I had a lengthy discussion with the patient and shared the work-up findings. We discussed the diagnosis and management plan as below. I spent 62 minutes  reviewing the records (labs, clinician notes, outside records, medical history, ordering medicine/tests/procedures, monitoring of anti-neoplastic toxicities, interpreting the imaging/labs previously done) and coordination of care as well as direct time with the patient today, of which greater than 50% of the time was spent in counseling and coordination of care with the patient/family.      Plan/Labs  Treatment would consist of Cisplatin 20 mg/m2 D1-5 + Etoposide 100 mg/m2 D1-5 for 4 cycles (good risk Stage III pure seminoma), consent being obtained today  C1 coming up with preceding labs  IR referral port  Zofran 8 mg ODT prn   EMLA  Audiometry referral due to baseline unilateral hearing loss  Dietitian referral       Follow Up: q3 weeks    All questions were answered to the patient's satisfaction during this encounter. The patient knows the contact information for our office and knows to reach out for any relevant concerns related to this encounter. They are to call for any temperature 100.4 or higher, new symptoms including but not restricted to shaking chills, decreased appetite, nausea, vomiting, diarrhea, increased fatigue, shortness of breath or chest pain, confusion, and not feeling the strength to come to the clinic. For all other listed problems and medical diagnosis in their chart - they are managed by PCP and/or other specialists, which the patient acknowledges. Thank you very much for your consultation and making us a part of this patient's care. We are continuing to follow closely with you. Please do not hesitate to reach out to me with any additional questions or concerns.    Shreyas Amezcua MD  Hematology & Medical Oncology Staff Physician             Disclaimer: This document was prepared using [employer] Fluency Direct technology. If a word or phrase is confusing, or does not make sense, this is likely due to recognition error which was not discovered during this clinician's review. If you believe  an error has occurred, please contact me through [facility] service line for kirill?cation.      ONCOLOGY HISTORY OF PRESENT ILLNESS        Oncology History   Seminoma of descended right testis (HCC)   5/24/2024 -  Cancer Staged    Staging form: Testis, AJCC 8th Edition  - Pathologic stage from 5/24/2024: Stage Unknown (pT1b, pNX, cM0, S0) - Signed by Shreyas Amezcua MD on 6/23/2024  Stage prefix: Initial diagnosis  Residual tumor (R): R0 - None       6/11/2024 Initial Diagnosis    Seminoma of descended right testis (HCC)           SUBJECTIVE  (INTERVAL HISTORY)      Clotting History None   Bleeding History None   Cancer History R Testicular   Family Cancer History None   H/O Blood/Plt Transfusion None   Tobacco/etoh/drug abuse 1 PPD x 3 years, quit at age 26, no etoh abuse, does recreational marijuana           Occupation  at a [employer]     Pain: none      I have reviewed the relevant past medical, surgical, social and family history. I have also reviewed allergies and medications for this patient.    Review of Systems  Baseline weight: 165-175 lbs    Denies F/C, N/V, SOB, CP, HA, rash, itching, gen weakness, melena, hematuria, hematochezia, falls, diarrhea, or constipation       A 10-point review of system was performed, pertinent positive and negative were detailed as above. Otherwise, the 10-point review of system was negative.      Past Medical History:   Diagnosis Date    Hypertension     resolved    Meniere's disease     left sided    PONV (postoperative nausea and vomiting)     Single kidney     left    Vertigo        Past Surgical History:   Procedure Laterality Date    HERNIA REPAIR      NASAL SINUS SURGERY Left 06/2020    NEPHRECTOMY Right     AR ORCHIECTOMY RADICAL TUMOR INGUINAL APPROACH Right 5/24/2024    Procedure: ORCHIECTOMY INGUINAL;  Surgeon: Nathan Hamm MD;  Location: [facility];  Service: Urology       Family History   Problem Relation Age of Onset    Hypertension Mother      Heart disease Father         cardiac pacemaker    Hypertension Father        Social History     Socioeconomic History    Marital status: /Civil Union     Spouse name: Not on file    Number of children: Not on file    Years of education: Not on file    Highest education level: Not on file   Occupational History    Not on file   Tobacco Use    Smoking status: Former     Current packs/day: 0.00     Types: Cigarettes     Quit date: 2018     Years since quittin.8     Passive exposure: Past    Smokeless tobacco: Never   Vaping Use    Vaping status: Never Used   Substance and Sexual Activity    Alcohol use: Not Currently    Drug use: Yes     Frequency: 7.0 times per week     Types: Marijuana     Comment: vidhi 24    Sexual activity: Not on file   Other Topics Concern    Not on file   Social History Narrative    Denied: Always uses seat belt    Caffeine use: one 16 oz cups of coffee, 1 cup of soda    Does not exercise     Social Determinants of Health     Financial Resource Strain: Not on file   Food Insecurity: Not on file   Transportation Needs: Not on file   Physical Activity: Not on file   Stress: Not on file   Social Connections: Not on file   Intimate Partner Violence: Not on file   Housing Stability: Not on file       No Known Allergies    Current Outpatient Medications   Medication Sig Dispense Refill    Apple Cider Vinegar 188 MG CAPS Take by mouth      Cholecalciferol (Vitamin D3) 125 MCG (5000 UT) TABS Take 5,000 Units by mouth daily      magnesium 30 MG tablet Take 30 mg by mouth 2 (two) times a day      Omega-3 Fatty Acids (Fish Oil) 300 MG CAPS Take by mouth      Pyridoxine HCl (VITAMIN B6 PO) Take by mouth      Turmeric (QC TUMERIC COMPLEX PO) Take by mouth      esomeprazole (NexIUM) 20 mg capsule Take 20 mg by mouth every morning before breakfast (Patient not taking: Reported on 2024)      meclizine (ANTIVERT) 25 mg tablet Take 1 tablet (25 mg total) by mouth every 8 (eight)  hours as needed for dizziness (Patient not taking: Reported on 5/16/2024) 30 tablet 0    meloxicam (Mobic) 15 mg tablet Take 1 tablet (15 mg total) by mouth daily (Patient not taking: Reported on 5/16/2024) 30 tablet 1    Mupirocin POWD Use Add content of one cap to 240 ml saline. Irrigate each nostril with 120 ml of medicated saline BID (Patient not taking: Reported on 5/16/2024)      NON FORMULARY Mometasone nasal irrigiation (Patient not taking: Reported on 6/11/2024)      NON FORMULARY 2 (two) times a day nurpirocin nasal irrigation (Patient not taking: Reported on 6/11/2024)      NON FORMULARY daily Vitamin B5 25mg (Patient not taking: Reported on 6/11/2024)      triamterene-hydrochlorothiazide (DYAZIDE) 37.5-25 mg per capsule Take 1 capsule by mouth every morning (Patient not taking: Reported on 5/16/2024) 90 capsule 0     No current facility-administered medications for this visit.       (Not in a hospital admission)      Objective:     24 Hour Vitals Assessment:     Vitals:    06/26/24 1426   BP: 128/80   Pulse: 76   Resp: 18   Temp: 98.3 °F (36.8 °C)   SpO2: 98%       PHYSICIAN EXAM:    General: Appearance: alert, cooperative, no distress.  HEENT: Normocephalic, atraumatic. No scleral icterus. conjunctivae clear. EOMI.  Chest: No tenderness to palpation. No open wound noted.  Lungs: Clear to auscultation bilaterally, Respirations unlabored.  Cardiac: Regular rate and rhythm, +S1and S2, -r/m/g  Abdomen: Soft, non-tender, non-distended. Bowel sounds are normal.  Extremities:  No edema, cyanosis, clubbing.  Skin: Skin color, turgor are normal. No rashes.  Lymphatics: no palpable supra-cervical, axillary, or inguinal adenopathy  Neurologic: Awake, Alert, and oriented, no gross focal deficits noted b/l.       DATA REVIEW:    Pathology Result:    Final Diagnosis   Date Value Ref Range Status   05/24/2024   Final    A.  Right testicle and spermatic cord (radical inguinal orchiectomy):     - Seminoma (4.5 cm).       - No definitive lymph-vascular invasion.     - Tunica vaginalis negative for tumor.     - Surgical margin negative for tumor.          Image Results:   Image result are reviewed and documented in Hematology/Oncology history    CT chest abdomen pelvis w contrast  Narrative: CT CHEST, ABDOMEN AND PELVIS WITH IV CONTRAST    INDICATION: N50.89: Other specified disorders of the male genital organs.    COMPARISON: None.    TECHNIQUE: CT examination of the chest, abdomen and pelvis was performed. Multiplanar 2D reformatted images were created from the source data.    This examination, like all CT scans performed in the [facility], was performed utilizing techniques to minimize radiation dose exposure, including the use of iterative reconstruction and automated exposure control. Radiation dose length   product (DLP) for this visit: 559 mGy-cm    IV Contrast: 100 mL of iohexol (OMNIPAQUE)  Enteric Contrast: Not administered.    FINDINGS:    CHEST    LUNGS: 2 mm calcified right lower lobe nodule (4/126). 4 mm left lower lobe nodule (4/82) and 3 mm left lower lobe nodule (4/80) along the major fissure concerning for metastases, given the patient's history. 12 x 7 mm soft tissue nodule medial inferior   lingula (4/133) may represent subsegmental atelectasis.    PLEURA: Unremarkable.    HEART/GREAT VESSELS: Heart is unremarkable for patient's age. No thoracic aortic aneurysm.    MEDIASTINUM AND NOEL: Bilateral hilar and mediastinal lymphadenopathy. Right hilar lymph node measures 2.9 x 2.1 cm. Subcarinal lymph node is 2.5 x 1.4 cm. Pretracheal lymph node is 2.2 x 1.1 cm. Left hilar lymph node is 1.3 x 1.1 cm.    CHEST WALL AND LOWER NECK: Unremarkable.    ABDOMEN    LIVER/BILIARY TREE: Unremarkable.    GALLBLADDER: Cholelithiasis without findings of acute cholecystitis.    SPLEEN: Unremarkable.    PANCREAS: Unremarkable.    ADRENAL GLANDS: Unremarkable.    KIDNEYS/URETERS: Status post right nephrectomy. No  "hydronephrosis.    STOMACH AND BOWEL: Unremarkable.    APPENDIX: Normal. 2 small appendicoliths are noted, the largest measuring 4 mm.    ABDOMINOPELVIC CAVITY: No ascites. No pneumoperitoneum. No lymphadenopathy. Nonpathologically enlarged, symmetric bilateral external iliac lymph nodes.    VESSELS: Unremarkable for patient's age.    PELVIS    REPRODUCTIVE ORGANS: Unremarkable for patient's age.    URINARY BLADDER: Unremarkable.    ABDOMINAL WALL/INGUINAL REGIONS: Postoperative changes from right orchiectomy.    BONES: No acute fracture or suspicious osseous lesion.  Impression: Multiple pulmonary nodules. No prior study for comparison. Based on current Fleischner Society 2017 Guidelines on incidental pulmonary nodule, patients with a known malignancy are at increased risk of metastasis and should receive initial three-month   follow-up chest CT.    Mediastinal and bilateral hilar lymphadenopathy concerning for metastatic disease given the patient's history.    Cholelithiasis.    Status post right orchiectomy with expected postoperative changes.    The study was marked in EPIC for significant notification.    Workstation performed: [id number]      LABS:  Lab data are reviewed and documented in HemOnc history.       Lab Results   Component Value Date    HGB 15.7 06/25/2024    HCT 44.1 06/25/2024    MCV 87 06/25/2024     06/25/2024    WBC 4.85 06/25/2024    NRBC 0 06/25/2024     Lab Results   Component Value Date     12/16/2017    K 3.7 06/25/2024     06/25/2024    CO2 29 06/25/2024    BUN 17 06/25/2024    CREATININE 1.29 06/25/2024    GLUF 82 06/25/2024    CALCIUM 9.5 06/25/2024    AST 43 (H) 06/25/2024    ALT 71 (H) 06/25/2024    ALKPHOS 44 06/25/2024    PROT 7.3 12/16/2017    BILITOT 1.0 12/16/2017    EGFR 71 06/25/2024       No results found for: \"IRON\", \"TIBC\", \"FERRITIN\"    Lab Results   Component Value Date    QNACNHJS24 400 02/14/2022       Recent Labs     06/25/24  1200   WBC 4.85   PLT " 184       By:  Shreyas Amezcua MD, 6/26/2024, 2:30 PM

## 2024-06-23 NOTE — PROGRESS NOTES
Hematology/Oncology Outpatient Office Note    Date of Service: 2024    [facility] HEMATOLOGY ONCOLOGY SPECIALISTS [address]  [facility] [place] [address]  [phone]    Reason for Consultation:   Chief Complaint   Patient presents with    Consult       Cancer Stage at diagnosis: IIIA    Referral Physician: Nathan Hamm MD    Primary Care Physician:  No primary care provider on file.     Nickname: Ron    Spouse: Bernie    Has a 4 month old son    Original ECO     Today's ECO    Goals and Barriers:  Current Goal: Minimize effects of disease burden, extend life.   Barriers to accomplishing this: None    Patient's Capacity to Self Care:  Patient is able to self care    ASSESSMENT & PLAN      Diagnosis ICD-10-CM Associated Orders   1. Seminoma of descended right testis (HCC)  C62.11 Ambulatory Referral to Hematology / Oncology            This is a 34 y.o. 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 I reviewed relevant office notes, imaging reports and pathology result as well.  I told the patient that this is a case of curable disease and what this means. We discussed that the goal of anti-cancer therapy is to provide best quality of life, extend overall survival, and progression free survival as shown in clinical trials.   I explained the risks/benefits of the proposed cancer therapy: Cisplatin + Etoposide and after discussion including understanding risks of possible life-threatening complications and  therapy-related malignancy development, informed consent for blood products and treatment has been signed and obtained.  TNM/Staging At Diagnosis  Cancer Staging   Seminoma of descended right testis (HCC)  Staging form: Testis, AJCC 8th Edition  - Pathologic stage from 5/24/2024: Stage Unknown (pT1b, cN2c, cM1a, S0) - Signed by Shreyas Amezcua MD on 6/23/2024  Stage prefix: Initial diagnosis  Residual tumor (R): R0 - None  [id number] (underlying LN)  Disease Features/Tumor Markers/Genetics  Tumor Marker: AFP, LDH, HCT WNL  6/25/2024: HCG, AFP, LDH WNL  Notable Path Features:   5/24/2024: Right testicle and spermatic cord (radical inguinal orchiectomy):     - Seminoma (4.5 cm).      - No definitive lymph-vascular invasion.     - Tunica vaginalis negative for tumor.     - Surgical margin negative for tumor  Treatment: Cisplatin + Etoposide   Other Supportive care:   Treatment Team Members  Surgeon: Dr. Hamm  Rad Onc  Palliative  Labs  Diagnostics  6/10/2024 CT CAP w/c: Multiple pulmonary nodules. No prior study for comparison. Based on current Fleischner Society 2017 Guidelines on incidental pulmonary nodule, patients with a known malignancy are at increased risk of metastasis and should receive initial three-month   follow-up chest CT. Mediastinal and bilateral hilar lymphadenopathy concerning for metastatic disease given the patient's history.  Bilateral hilar and mediastinal lymphadenopathy. Right hilar lymph node measures 2.9 x 2.1 cm. Subcarinal lymph node is 2.5 x 1.4 cm. Pretracheal lymph node is 2.2 x 1.1 cm. Left hilar lymph node is 1.3 x 1.1 cm.     Discussion of decision making    I personally reviewed the following lab results, the image studies, pathology, other specialty/physicians consult notes and recommendations, and outside medical records. I had a lengthy discussion with the patient and shared the work-up findings. We discussed the diagnosis and management plan as below. I spent 62 minutes  reviewing the records (labs, clinician notes, outside records, medical history, ordering medicine/tests/procedures, monitoring of anti-neoplastic toxicities, interpreting the imaging/labs previously done) and coordination of care as well as direct time with the patient today, of which greater than 50% of the time was spent in counseling and coordination of care with the patient/family.      Plan/Labs  Treatment would consist of Cisplatin 20 mg/m2 D1-5 + Etoposide 100 mg/m2 D1-5 for 4 cycles (good risk Stage III pure seminoma), consent being obtained today  C1 coming up with preceding labs  IR referral port  Zofran 8 mg ODT prn   EMLA  Audiometry referral due to baseline unilateral hearing loss  Dietitian referral       Follow Up: q3 weeks    All questions were answered to the patient's satisfaction during this encounter. The patient knows the contact information for our office and knows to reach out for any relevant concerns related to this encounter. They are to call for any temperature 100.4 or higher, new symptoms including but not restricted to shaking chills, decreased appetite, nausea, vomiting, diarrhea, increased fatigue, shortness of breath or chest pain, confusion, and not feeling the strength to come to the clinic. For all other listed problems and medical diagnosis in their chart - they are managed by PCP and/or other specialists, which the patient acknowledges. Thank you very much for your consultation and making us a part of this patient's care. We are continuing to follow closely with you. Please do not hesitate to reach out to me with any additional questions or concerns.    Shreyas Amezcua MD  Hematology & Medical Oncology Staff Physician             Disclaimer: This document was prepared using [employer] Fluency Direct technology. If a word or phrase is confusing, or does not make sense, this is likely due to recognition error which was not discovered during this clinician's review. If you believe  an error has occurred, please contact me through [facility] service line for kirill?cation.      ONCOLOGY HISTORY OF PRESENT ILLNESS        Oncology History   Seminoma of descended right testis (HCC)   5/24/2024 -  Cancer Staged    Staging form: Testis, AJCC 8th Edition  - Pathologic stage from 5/24/2024: Stage Unknown (pT1b, pNX, cM0, S0) - Signed by Shreyas Amezcua MD on 6/23/2024  Stage prefix: Initial diagnosis  Residual tumor (R): R0 - None       6/11/2024 Initial Diagnosis    Seminoma of descended right testis (HCC)           SUBJECTIVE  (INTERVAL HISTORY)      Clotting History None   Bleeding History None   Cancer History R Testicular   Family Cancer History None   H/O Blood/Plt Transfusion None   Tobacco/etoh/drug abuse 1 PPD x 3 years, quit at age 26, no etoh abuse, does recreational marijuana           Occupation  at a [employer]     Pain: none      I have reviewed the relevant past medical, surgical, social and family history. I have also reviewed allergies and medications for this patient.    Review of Systems  Baseline weight: 165-175 lbs    Denies F/C, N/V, SOB, CP, HA, rash, itching, gen weakness, melena, hematuria, hematochezia, falls, diarrhea, or constipation       A 10-point review of system was performed, pertinent positive and negative were detailed as above. Otherwise, the 10-point review of system was negative.      Past Medical History:   Diagnosis Date    Hypertension     resolved    Meniere's disease     left sided    PONV (postoperative nausea and vomiting)     Single kidney     left    Vertigo        Past Surgical History:   Procedure Laterality Date    HERNIA REPAIR      NASAL SINUS SURGERY Left 06/2020    NEPHRECTOMY Right     MS ORCHIECTOMY RADICAL TUMOR INGUINAL APPROACH Right 5/24/2024    Procedure: ORCHIECTOMY INGUINAL;  Surgeon: Nathan Hamm MD;  Location: [facility];  Service: Urology       Family History   Problem Relation Age of Onset    Hypertension Mother      Heart disease Father         cardiac pacemaker    Hypertension Father        Social History     Socioeconomic History    Marital status: /Civil Union     Spouse name: Not on file    Number of children: Not on file    Years of education: Not on file    Highest education level: Not on file   Occupational History    Not on file   Tobacco Use    Smoking status: Former     Current packs/day: 0.00     Types: Cigarettes     Quit date: 2018     Years since quittin.8     Passive exposure: Past    Smokeless tobacco: Never   Vaping Use    Vaping status: Never Used   Substance and Sexual Activity    Alcohol use: Not Currently    Drug use: Yes     Frequency: 7.0 times per week     Types: Marijuana     Comment: vidhi 24    Sexual activity: Not on file   Other Topics Concern    Not on file   Social History Narrative    Denied: Always uses seat belt    Caffeine use: one 16 oz cups of coffee, 1 cup of soda    Does not exercise     Social Determinants of Health     Financial Resource Strain: Not on file   Food Insecurity: Not on file   Transportation Needs: Not on file   Physical Activity: Not on file   Stress: Not on file   Social Connections: Not on file   Intimate Partner Violence: Not on file   Housing Stability: Not on file       No Known Allergies    Current Outpatient Medications   Medication Sig Dispense Refill    Apple Cider Vinegar 188 MG CAPS Take by mouth      Cholecalciferol (Vitamin D3) 125 MCG (5000 UT) TABS Take 5,000 Units by mouth daily      magnesium 30 MG tablet Take 30 mg by mouth 2 (two) times a day      Omega-3 Fatty Acids (Fish Oil) 300 MG CAPS Take by mouth      Pyridoxine HCl (VITAMIN B6 PO) Take by mouth      Turmeric (QC TUMERIC COMPLEX PO) Take by mouth      esomeprazole (NexIUM) 20 mg capsule Take 20 mg by mouth every morning before breakfast (Patient not taking: Reported on 2024)      meclizine (ANTIVERT) 25 mg tablet Take 1 tablet (25 mg total) by mouth every 8 (eight)  hours as needed for dizziness (Patient not taking: Reported on 5/16/2024) 30 tablet 0    meloxicam (Mobic) 15 mg tablet Take 1 tablet (15 mg total) by mouth daily (Patient not taking: Reported on 5/16/2024) 30 tablet 1    Mupirocin POWD Use Add content of one cap to 240 ml saline. Irrigate each nostril with 120 ml of medicated saline BID (Patient not taking: Reported on 5/16/2024)      NON FORMULARY Mometasone nasal irrigiation (Patient not taking: Reported on 6/11/2024)      NON FORMULARY 2 (two) times a day nurpirocin nasal irrigation (Patient not taking: Reported on 6/11/2024)      NON FORMULARY daily Vitamin B5 25mg (Patient not taking: Reported on 6/11/2024)      triamterene-hydrochlorothiazide (DYAZIDE) 37.5-25 mg per capsule Take 1 capsule by mouth every morning (Patient not taking: Reported on 5/16/2024) 90 capsule 0     No current facility-administered medications for this visit.       (Not in a hospital admission)      Objective:     24 Hour Vitals Assessment:     Vitals:    06/26/24 1426   BP: 128/80   Pulse: 76   Resp: 18   Temp: 98.3 °F (36.8 °C)   SpO2: 98%       PHYSICIAN EXAM:    General: Appearance: alert, cooperative, no distress.  HEENT: Normocephalic, atraumatic. No scleral icterus. conjunctivae clear. EOMI.  Chest: No tenderness to palpation. No open wound noted.  Lungs: Clear to auscultation bilaterally, Respirations unlabored.  Cardiac: Regular rate and rhythm, +S1and S2, -r/m/g  Abdomen: Soft, non-tender, non-distended. Bowel sounds are normal.  Extremities:  No edema, cyanosis, clubbing.  Skin: Skin color, turgor are normal. No rashes.  Lymphatics: no palpable supra-cervical, axillary, or inguinal adenopathy  Neurologic: Awake, Alert, and oriented, no gross focal deficits noted b/l.       DATA REVIEW:    Pathology Result:    Final Diagnosis   Date Value Ref Range Status   05/24/2024   Final    A.  Right testicle and spermatic cord (radical inguinal orchiectomy):     - Seminoma (4.5 cm).       - No definitive lymph-vascular invasion.     - Tunica vaginalis negative for tumor.     - Surgical margin negative for tumor.          Image Results:   Image result are reviewed and documented in Hematology/Oncology history    CT chest abdomen pelvis w contrast  Narrative: CT CHEST, ABDOMEN AND PELVIS WITH IV CONTRAST    INDICATION: N50.89: Other specified disorders of the male genital organs.    COMPARISON: None.    TECHNIQUE: CT examination of the chest, abdomen and pelvis was performed. Multiplanar 2D reformatted images were created from the source data.    This examination, like all CT scans performed in the [facility], was performed utilizing techniques to minimize radiation dose exposure, including the use of iterative reconstruction and automated exposure control. Radiation dose length   product (DLP) for this visit: 559 mGy-cm    IV Contrast: 100 mL of iohexol (OMNIPAQUE)  Enteric Contrast: Not administered.    FINDINGS:    CHEST    LUNGS: 2 mm calcified right lower lobe nodule (4/126). 4 mm left lower lobe nodule (4/82) and 3 mm left lower lobe nodule (4/80) along the major fissure concerning for metastases, given the patient's history. 12 x 7 mm soft tissue nodule medial inferior   lingula (4/133) may represent subsegmental atelectasis.    PLEURA: Unremarkable.    HEART/GREAT VESSELS: Heart is unremarkable for patient's age. No thoracic aortic aneurysm.    MEDIASTINUM AND NOEL: Bilateral hilar and mediastinal lymphadenopathy. Right hilar lymph node measures 2.9 x 2.1 cm. Subcarinal lymph node is 2.5 x 1.4 cm. Pretracheal lymph node is 2.2 x 1.1 cm. Left hilar lymph node is 1.3 x 1.1 cm.    CHEST WALL AND LOWER NECK: Unremarkable.    ABDOMEN    LIVER/BILIARY TREE: Unremarkable.    GALLBLADDER: Cholelithiasis without findings of acute cholecystitis.    SPLEEN: Unremarkable.    PANCREAS: Unremarkable.    ADRENAL GLANDS: Unremarkable.    KIDNEYS/URETERS: Status post right nephrectomy. No  "hydronephrosis.    STOMACH AND BOWEL: Unremarkable.    APPENDIX: Normal. 2 small appendicoliths are noted, the largest measuring 4 mm.    ABDOMINOPELVIC CAVITY: No ascites. No pneumoperitoneum. No lymphadenopathy. Nonpathologically enlarged, symmetric bilateral external iliac lymph nodes.    VESSELS: Unremarkable for patient's age.    PELVIS    REPRODUCTIVE ORGANS: Unremarkable for patient's age.    URINARY BLADDER: Unremarkable.    ABDOMINAL WALL/INGUINAL REGIONS: Postoperative changes from right orchiectomy.    BONES: No acute fracture or suspicious osseous lesion.  Impression: Multiple pulmonary nodules. No prior study for comparison. Based on current Fleischner Society 2017 Guidelines on incidental pulmonary nodule, patients with a known malignancy are at increased risk of metastasis and should receive initial three-month   follow-up chest CT.    Mediastinal and bilateral hilar lymphadenopathy concerning for metastatic disease given the patient's history.    Cholelithiasis.    Status post right orchiectomy with expected postoperative changes.    The study was marked in EPIC for significant notification.    Workstation performed: [id number]      LABS:  Lab data are reviewed and documented in HemOnc history.       Lab Results   Component Value Date    HGB 15.7 06/25/2024    HCT 44.1 06/25/2024    MCV 87 06/25/2024     06/25/2024    WBC 4.85 06/25/2024    NRBC 0 06/25/2024     Lab Results   Component Value Date     12/16/2017    K 3.7 06/25/2024     06/25/2024    CO2 29 06/25/2024    BUN 17 06/25/2024    CREATININE 1.29 06/25/2024    GLUF 82 06/25/2024    CALCIUM 9.5 06/25/2024    AST 43 (H) 06/25/2024    ALT 71 (H) 06/25/2024    ALKPHOS 44 06/25/2024    PROT 7.3 12/16/2017    BILITOT 1.0 12/16/2017    EGFR 71 06/25/2024       No results found for: \"IRON\", \"TIBC\", \"FERRITIN\"    Lab Results   Component Value Date    [id number] 400 02/14/2022       Recent Labs     06/25/24  1200   WBC 4.85   PLT " 184       By:  Shreyas Amezcua MD, 6/26/2024, 2:30 PM

## 2024-06-25 ENCOUNTER — APPOINTMENT (OUTPATIENT)
Age: 34
End: 2024-06-25
Payer: COMMERCIAL

## 2024-06-25 DIAGNOSIS — C62.11 SEMINOMA OF DESCENDED RIGHT TESTIS (HCC): ICD-10-CM

## 2024-06-25 LAB
AFP-TM SERPL-MCNC: 3.31 NG/ML (ref 0–9)
ALBUMIN SERPL BCG-MCNC: 4.2 G/DL (ref 3.5–5)
ALP SERPL-CCNC: 44 U/L (ref 34–104)
ALT SERPL W P-5'-P-CCNC: 71 U/L (ref 7–52)
ANION GAP SERPL CALCULATED.3IONS-SCNC: 9 MMOL/L (ref 4–13)
AST SERPL W P-5'-P-CCNC: 43 U/L (ref 13–39)
BASOPHILS # BLD AUTO: 0.02 THOUSANDS/ÂΜL (ref 0–0.1)
BASOPHILS NFR BLD AUTO: 0 % (ref 0–1)
BILIRUB SERPL-MCNC: 0.72 MG/DL (ref 0.2–1)
BUN SERPL-MCNC: 17 MG/DL (ref 5–25)
CALCIUM SERPL-MCNC: 9.5 MG/DL (ref 8.4–10.2)
CHLORIDE SERPL-SCNC: 101 MMOL/L (ref 96–108)
CO2 SERPL-SCNC: 29 MMOL/L (ref 21–32)
CREAT SERPL-MCNC: 1.29 MG/DL (ref 0.6–1.3)
EOSINOPHIL # BLD AUTO: 0.2 THOUSAND/ÂΜL (ref 0–0.61)
EOSINOPHIL NFR BLD AUTO: 4 % (ref 0–6)
ERYTHROCYTE [DISTWIDTH] IN BLOOD BY AUTOMATED COUNT: 11.8 % (ref 11.6–15.1)
GFR SERPL CREATININE-BSD FRML MDRD: 71 ML/MIN/1.73SQ M
GLUCOSE P FAST SERPL-MCNC: 82 MG/DL (ref 65–99)
HCG-TM SERPL-SCNC: <0.6 MLU/ML
HCT VFR BLD AUTO: 44.1 % (ref 36.5–49.3)
HGB BLD-MCNC: 15.7 G/DL (ref 12–17)
IMM GRANULOCYTES # BLD AUTO: 0 THOUSAND/UL (ref 0–0.2)
IMM GRANULOCYTES NFR BLD AUTO: 0 % (ref 0–2)
LDH SERPL-CCNC: 177 U/L (ref 140–271)
LYMPHOCYTES # BLD AUTO: 1.55 THOUSANDS/ÂΜL (ref 0.6–4.47)
LYMPHOCYTES NFR BLD AUTO: 32 % (ref 14–44)
MCH RBC QN AUTO: 31 PG (ref 26.8–34.3)
MCHC RBC AUTO-ENTMCNC: 35.6 G/DL (ref 31.4–37.4)
MCV RBC AUTO: 87 FL (ref 82–98)
MONOCYTES # BLD AUTO: 0.41 THOUSAND/ÂΜL (ref 0.17–1.22)
MONOCYTES NFR BLD AUTO: 9 % (ref 4–12)
NEUTROPHILS # BLD AUTO: 2.67 THOUSANDS/ÂΜL (ref 1.85–7.62)
NEUTS SEG NFR BLD AUTO: 55 % (ref 43–75)
NRBC BLD AUTO-RTO: 0 /100 WBCS
PLATELET # BLD AUTO: 184 THOUSANDS/UL (ref 149–390)
PMV BLD AUTO: 11.2 FL (ref 8.9–12.7)
POTASSIUM SERPL-SCNC: 3.7 MMOL/L (ref 3.5–5.3)
PROT SERPL-MCNC: 6.8 G/DL (ref 6.4–8.4)
RBC # BLD AUTO: 5.06 MILLION/UL (ref 3.88–5.62)
SODIUM SERPL-SCNC: 139 MMOL/L (ref 135–147)
WBC # BLD AUTO: 4.85 THOUSAND/UL (ref 4.31–10.16)

## 2024-06-25 PROCEDURE — 80053 COMPREHEN METABOLIC PANEL: CPT

## 2024-06-25 PROCEDURE — 82105 ALPHA-FETOPROTEIN SERUM: CPT

## 2024-06-25 PROCEDURE — 85025 COMPLETE CBC W/AUTO DIFF WBC: CPT

## 2024-06-25 PROCEDURE — 83615 LACTATE (LD) (LDH) ENZYME: CPT

## 2024-06-25 PROCEDURE — 84702 CHORIONIC GONADOTROPIN TEST: CPT

## 2024-06-25 PROCEDURE — 36415 COLL VENOUS BLD VENIPUNCTURE: CPT

## 2024-06-26 ENCOUNTER — CONSULT (OUTPATIENT)
Dept: HEMATOLOGY ONCOLOGY | Facility: CLINIC | Age: 34
End: 2024-06-26
Payer: COMMERCIAL

## 2024-06-26 ENCOUNTER — TELEPHONE (OUTPATIENT)
Dept: HEMATOLOGY ONCOLOGY | Facility: CLINIC | Age: 34
End: 2024-06-26

## 2024-06-26 ENCOUNTER — PATIENT OUTREACH (OUTPATIENT)
Dept: HEMATOLOGY ONCOLOGY | Facility: CLINIC | Age: 34
End: 2024-06-26

## 2024-06-26 VITALS
OXYGEN SATURATION: 98 % | DIASTOLIC BLOOD PRESSURE: 80 MMHG | SYSTOLIC BLOOD PRESSURE: 128 MMHG | HEIGHT: 67 IN | WEIGHT: 176 LBS | RESPIRATION RATE: 18 BRPM | HEART RATE: 76 BPM | BODY MASS INDEX: 27.62 KG/M2 | TEMPERATURE: 98.3 F

## 2024-06-26 DIAGNOSIS — R11.2 CHEMOTHERAPY INDUCED NAUSEA AND VOMITING: ICD-10-CM

## 2024-06-26 DIAGNOSIS — C62.11 SEMINOMA OF DESCENDED RIGHT TESTIS (HCC): Primary | ICD-10-CM

## 2024-06-26 DIAGNOSIS — T45.1X5A CHEMOTHERAPY INDUCED NAUSEA AND VOMITING: ICD-10-CM

## 2024-06-26 PROCEDURE — 99205 OFFICE O/P NEW HI 60 MIN: CPT | Performed by: INTERNAL MEDICINE

## 2024-06-26 RX ORDER — LIDOCAINE AND PRILOCAINE 25; 25 MG/G; MG/G
CREAM TOPICAL AS NEEDED
Qty: 50 G | Refills: 1 | Status: SHIPPED | OUTPATIENT
Start: 2024-06-26

## 2024-06-26 RX ORDER — SODIUM CHLORIDE 9 MG/ML
20 INJECTION, SOLUTION INTRAVENOUS ONCE
OUTPATIENT
Start: 2024-07-18

## 2024-06-26 RX ORDER — SODIUM CHLORIDE 9 MG/ML
20 INJECTION, SOLUTION INTRAVENOUS ONCE
OUTPATIENT
Start: 2024-07-17

## 2024-06-26 RX ORDER — SODIUM CHLORIDE 9 MG/ML
20 INJECTION, SOLUTION INTRAVENOUS ONCE
OUTPATIENT
Start: 2024-07-16

## 2024-06-26 RX ORDER — SODIUM CHLORIDE 9 MG/ML
20 INJECTION, SOLUTION INTRAVENOUS ONCE
OUTPATIENT
Start: 2024-07-19

## 2024-06-26 RX ORDER — SODIUM CHLORIDE 9 MG/ML
20 INJECTION, SOLUTION INTRAVENOUS ONCE
OUTPATIENT
Start: 2024-07-15

## 2024-06-26 RX ORDER — ONDANSETRON 8 MG/1
8 TABLET, ORALLY DISINTEGRATING ORAL EVERY 8 HOURS PRN
Qty: 20 TABLET | Refills: 1 | Status: SHIPPED | OUTPATIENT
Start: 2024-06-26

## 2024-06-26 NOTE — PROGRESS NOTES
Reviewed oncolink printouts for cisplatin and etoposide. Reviewed infusion recommendations and lab recommendations. Reviewed port information as well.     Reviewed office handout with H[facility]EL[facility] number.     Reviewed importance of sperm banking and audiology consultation appointment prior to starting chemotherapy. Patient stated hopefully will be starting sperm banking process next week.    Email sent to  Audiology team to assist with consultation appointment.    Reviewed supplements patient is on with Dr. Amezcua and recommendation to hold Beet root during treatment. My chart message sent with recommendation.    Consent obtained on 6/26/24. Copy given to patient and uploaded into patient chart.

## 2024-06-26 NOTE — PROGRESS NOTES
Met with patient during MED ONC appt today. Questionnaire completed. Patient has my direct contact for needs moving forward.

## 2024-06-27 ENCOUNTER — TELEPHONE (OUTPATIENT)
Dept: INTERVENTIONAL RADIOLOGY/VASCULAR | Facility: HOSPITAL | Age: 34
End: 2024-06-27

## 2024-06-27 ENCOUNTER — TELEPHONE (OUTPATIENT)
Dept: NUTRITION | Facility: CLINIC | Age: 34
End: 2024-06-27

## 2024-06-27 NOTE — TELEPHONE ENCOUNTER
Received notification by Dr. Amezcua on 6/26/24 that pt has triggered for oncology nutrition care (reason for referral: Ambulatory referral for nutrition services for oncology ).    Contacted Ron today to establish care.  No answer.  Left voice message with the reason for today's call and this RD’s contact information asking that Ron call back as able/desired.

## 2024-07-01 ENCOUNTER — HOSPITAL ENCOUNTER (OUTPATIENT)
Dept: INTERVENTIONAL RADIOLOGY/VASCULAR | Facility: HOSPITAL | Age: 34
Discharge: HOME | End: 2024-07-01
Admitting: RADIOLOGY
Payer: COMMERCIAL

## 2024-07-01 VITALS
TEMPERATURE: 97.8 F | WEIGHT: 176 LBS | SYSTOLIC BLOOD PRESSURE: 124 MMHG | HEIGHT: 67 IN | DIASTOLIC BLOOD PRESSURE: 74 MMHG | RESPIRATION RATE: 18 BRPM | BODY MASS INDEX: 27.62 KG/M2 | HEART RATE: 68 BPM | OXYGEN SATURATION: 98 %

## 2024-07-01 DIAGNOSIS — C62.11 SEMINOMA OF DESCENDED RIGHT TESTIS (HCC): ICD-10-CM

## 2024-07-01 LAB
INR PPP: 0.99 (ref 0.84–1.19)
PROTHROMBIN TIME: 13.4 SECONDS (ref 11.6–14.5)

## 2024-07-01 PROCEDURE — 85610 PROTHROMBIN TIME: CPT | Performed by: RADIOLOGY

## 2024-07-01 PROCEDURE — 99152 MOD SED SAME PHYS/QHP 5/>YRS: CPT

## 2024-07-01 PROCEDURE — 77001 FLUOROGUIDE FOR VEIN DEVICE: CPT

## 2024-07-01 PROCEDURE — C1788 PORT, INDWELLING, IMP: HCPCS

## 2024-07-01 PROCEDURE — 36561 INSERT TUNNELED CV CATH: CPT

## 2024-07-01 PROCEDURE — 76937 US GUIDE VASCULAR ACCESS: CPT

## 2024-07-01 PROCEDURE — C1894 INTRO/SHEATH, NON-LASER: HCPCS

## 2024-07-01 RX ORDER — LIDOCAINE WITH 8.4% SOD BICARB 0.9%(10ML)
SYRINGE (ML) INJECTION AS NEEDED
Status: COMPLETED | OUTPATIENT
Start: 2024-07-01 | End: 2024-07-01

## 2024-07-01 RX ORDER — CEFAZOLIN SODIUM 2 G/50ML
2000 SOLUTION INTRAVENOUS ONCE
Status: COMPLETED | OUTPATIENT
Start: 2024-07-01 | End: 2024-07-01

## 2024-07-01 RX ORDER — FENTANYL CITRATE 50 UG/ML
INJECTION, SOLUTION INTRAMUSCULAR; INTRAVENOUS AS NEEDED
Status: COMPLETED | OUTPATIENT
Start: 2024-07-01 | End: 2024-07-01

## 2024-07-01 RX ORDER — MIDAZOLAM HYDROCHLORIDE 2 MG/2ML
INJECTION, SOLUTION INTRAMUSCULAR; INTRAVENOUS AS NEEDED
Status: COMPLETED | OUTPATIENT
Start: 2024-07-01 | End: 2024-07-01

## 2024-07-01 RX ADMIN — MIDAZOLAM HYDROCHLORIDE 1 MG: 1 INJECTION, SOLUTION INTRAMUSCULAR; INTRAVENOUS at 09:56

## 2024-07-01 RX ADMIN — MIDAZOLAM HYDROCHLORIDE 1 MG: 1 INJECTION, SOLUTION INTRAMUSCULAR; INTRAVENOUS at 09:51

## 2024-07-01 RX ADMIN — FENTANYL CITRATE 50 MCG: 50 INJECTION, SOLUTION INTRAMUSCULAR; INTRAVENOUS at 09:51

## 2024-07-01 RX ADMIN — FENTANYL CITRATE 50 MCG: 50 INJECTION, SOLUTION INTRAMUSCULAR; INTRAVENOUS at 09:56

## 2024-07-01 RX ADMIN — Medication 10 ML: at 09:54

## 2024-07-01 RX ADMIN — CEFAZOLIN SODIUM 2000 MG: 2 SOLUTION INTRAVENOUS at 09:31

## 2024-07-01 NOTE — INTERVAL H&P NOTE
Update: (This section must be completed if the H&P was completed greater than 24 hrs to procedure or admission)    H&P reviewed. After examining the patient, I find no changed to the H&P since it had been written.    Patient re-evaluated. Accept as history and physical.    Sumeet Styles MD/July 1, 2024/9:24 AM

## 2024-07-01 NOTE — DISCHARGE INSTRUCTIONS
[facility] Interventional Radiology        [address] [place] [address]             Phone:  [phone]                  IR Scheduling: [phone]       Procedural Sedation   WHAT YOU NEED TO KNOW:     Procedural sedation is medicine used during procedures to help you feel relaxed and calm. You will remember little to none of the procedure. After sedation you may feel tired, weak, or unsteady on your feet. You may also have trouble concentrating or short-term memory loss. These symptoms should go away in 24 hours or less.     DISCHARGE INSTRUCTIONS:     Call 911 or have someone else call for any of the following:     You have sudden trouble breathing.     You cannot be woken.     Contact Interventional Radiology at [phone]      You have a severe headache or dizziness.     Your heart is beating faster than usual.    You have a fever or chills.     Your skin is itchy, swollen, or you have a rash.     You have nausea or are vomiting for more than 8 hours after the procedure.      You have questions or concerns about your condition or care.    Self-care:     Have someone stay with you for 24 hours. This person can drive you to errands and help you do things around the house. This person can also watch for problems.      Rest and do quiet activities for 24 hours. Do not exercise, ride a bike, or play sports. Stand up slowly to prevent dizziness and falls. Take short walks around the house with another person. Slowly return to your usual activities the next day.      Do not drive or use dangerous machines or tools for 24 hours. You may injure yourself or others. Examples include a lawnmower, saw, or drill. Do not return to work for 24 hours if you use dangerous machines or tools for work.      Do not make important decisions for 24 hours. For example, do not sign important papers or invest money.      Drink liquids as directed. Liquids help flush the sedation medicine out of your  body. Ask how much liquid to drink each day and which liquids are best for you.      Eat small, frequent meals to prevent nausea and vomiting. Start with clear liquids such as juice or broth. If you do not vomit after clear liquids, you can eat your usual foods.      Do not drink alcohol or take medicines that make you drowsy. This includes medicines that help you sleep and anxiety medicines. Ask your healthcare provider if it is safe for you to take pain medicine.  Follow up with your healthcare provider as directed: Write down your questions so you remember to ask them during your visits.     Implanted Venous Access Port     WHAT YOU NEED TO KNOW:     An implanted venous access port is a device used to give treatments and take blood. It may also be called a central venous access device (CVAD). The port is a small container that is placed under your skin, usually in your upper chest. The port is attached to a catheter that enters a large vein.     DISCHARGE INSTRUCTIONS:     Resume your normal diet. Small sips of flat soda will help with mild nausea.    Prevent an infection:     Wash your hands often.  Use soap and water. Clean your hands before and after you care for your port. Remind everyone who cares for your port to wash their hands.    Check your skin for infection every day.  Look for redness, swelling, or fluid oozing from the port site.    Care for your port:     1. You may shower beginning 48 hours after procedure.     2.  Leave glue in place.    3. It is normal for some bruising to occur.    4. Use Tylenol for pain.    5. Limit use of arm on the side that your port was placed. Lift nothing heavier than 5 pounds for 1 week, and then gradually increase activity as tolerated.    6. DO NOT apply ointment, lotion or cream to port site until incision is healed. Allow glue to fall off. DO NOT attempt to peel glue from skin even it it begins to flake.     7. After the port incision is healed you may swim,  mary.    Notify the Interventional Radiologist if you have any of the followin. Fever above 101 F    2. Increased redness or swelling after 1st day.     3. Increased pain after 1st day.    4. Any sign of infection (drainage from port site, skin separation, hot to touch).    5. Persistent nausea or vomiting.    Contact Interventional Radiology at [phone]

## 2024-07-01 NOTE — BRIEF OP NOTE (RAD/CATH)
INTERVENTIONAL RADIOLOGY PROCEDURE NOTE    Date: 7/1/2024    Procedure: Power port placement  Procedure Summary       Date: 07/01/24 Room / Location: [facility] Interventional Radiology    Anesthesia Start:  Anesthesia Stop:     Procedure: IR PORT PLACEMENT Diagnosis:       Seminoma of descended right testis (HCC)      (Seminoma of descended right testis)    Scheduled Providers:  Responsible Provider:     Anesthesia Type: Not recorded ASA Status: Not recorded            Preoperative diagnosis:   1. Seminoma of descended right testis (HCC)         Postoperative diagnosis: Same.    Surgeon: Sumeet Styles MD     Assistant: None. No qualified resident was available.    Blood loss: Minimal    Specimens: None     Findings: Right internal jugular SL power port placed. OK to use.    Complications: None immediate.    Anesthesia: conscious sedation

## 2024-07-02 ENCOUNTER — TELEPHONE (OUTPATIENT)
Age: 34
End: 2024-07-02

## 2024-07-02 ENCOUNTER — PATIENT OUTREACH (OUTPATIENT)
Dept: HEMATOLOGY ONCOLOGY | Facility: CLINIC | Age: 34
End: 2024-07-02

## 2024-07-02 ENCOUNTER — PATIENT OUTREACH (OUTPATIENT)
Dept: CASE MANAGEMENT | Facility: HOSPITAL | Age: 34
End: 2024-07-02

## 2024-07-02 DIAGNOSIS — C62.11 SEMINOMA OF DESCENDED RIGHT TESTIS (HCC): Primary | ICD-10-CM

## 2024-07-02 NOTE — TELEPHONE ENCOUNTER
Second attempt made today to reach [facility] to establish care and discuss his nutrition.  No answer.  Left a voice message requesting a call back at [facility]'s convenience.

## 2024-07-02 NOTE — PROGRESS NOTES
"Patient called and NILSON stating  \"Hi Bernie, this is Ron Franco. My birthday is January 21st, 1990. I'm calling because I have a question about fertility clinics. I believe the first time that you and I spoke, you mentioned that [facility] has programs in place to help people cover the cost. We were told by our insurance company that they were going to cover the cost of the sperm banking and now they're telling us that they are not going to. So I just wanted to talk with you and see if I heard you correctly that there was an option through [facility] for some assistance in this. If you give me a call back, I'd appreciate it. My number is [phone]. Thank you. Phil.\"    I returned his call. LM asking him to return my call. He has my direct line.  "

## 2024-07-02 NOTE — TELEPHONE ENCOUNTER
Received call back from Ron. He is interested in setting up an appointment to discuss his nutrition. Initial phone consultation scheduled for 7/5/24 at 1:30.

## 2024-07-02 NOTE — PROGRESS NOTES
OSW received referral for patient. He is looking for assistance with fertility costs. OSW will outreach for assessment

## 2024-07-02 NOTE — PROGRESS NOTES
Patient returned my call. He stated that per [facility] it will be about $1,200 initially and then $125 a month to store. I have patient information on [facility]. He did already call them and is going to get more info and let me know what they decide. Patient feels that he will cancel appt with Harjit Bravo at this point. If he needs financial help he will contact me. He has my direct number.    DT also complete during this call. Due to social, emotional, and practical concerns oncology social work referral was placed.

## 2024-07-03 ENCOUNTER — PATIENT OUTREACH (OUTPATIENT)
Dept: HEMATOLOGY ONCOLOGY | Facility: CLINIC | Age: 34
End: 2024-07-03

## 2024-07-03 ENCOUNTER — PATIENT OUTREACH (OUTPATIENT)
Dept: CASE MANAGEMENT | Facility: HOSPITAL | Age: 34
End: 2024-07-03

## 2024-07-03 NOTE — PROGRESS NOTES
OSW outreached to patient however patient unable to talk at this time. Spouse answered and stated that they  were able to find organization that was more in their price range.    OSW was able to provide overview of additional financial supports that could be available to patient/family for fertility but also medical/household bills if needed for short term.     OSW will outreach next week.

## 2024-07-03 NOTE — PROGRESS NOTES
July 3, 2024  Me   to Ron Franco   EK      7/3/24  2:53 PM  Timo Velasquez,     Great, wonderful news! Yes, we will send over a referral. Please keep me updated when you get scheduled.     Thank you,     Bernie Milligan MA, [facility] Oncology Care Coordinator  [facility] [place] [address]  [phone]    This [employer] message has not been read.       EP    7/3/24  2:28 PM  Laurie Qiu, RN routed this conversation to Me  Ailyn Tan, RN   to Hematology Oncology Wing Clinical   EC    7/3/24  2:26 PM  Pt requesting referral for [facility] per previous discussions with Bernie. Thanks!  Ron Franco   to P Hematology & Oncology Pod Clinical (supporting You)         7/3/24  2:20 PM  Gallo Gibbs!    We got in touch with [facility] and the pricing you gave is correct and is a much more affordable option for us.  We found out that they actually have a program for people going through this type of situation that provides discounts!  I’m so glad I decided to call you!  They did ask that we send over a referral.  Can you help with that?      -Ron Franco  July 2, 2024  Me   to Ron Franco   EK      7/2/24  4:00 PM  Timo Velasquez,     Please keep me updated on the fertility appointments and if you need financial help in anyway.        Thank you,     Bernie Milligan MA, [facility] Oncology Care Coordinator  [address] [place] [address] [place] [address]  [phone]      Referral will be placed and faxed to [facility] for Fertility.

## 2024-07-05 ENCOUNTER — DOCUMENTATION (OUTPATIENT)
Dept: HEMATOLOGY ONCOLOGY | Facility: CLINIC | Age: 34
End: 2024-07-05

## 2024-07-05 ENCOUNTER — OFFICE VISIT (OUTPATIENT)
Dept: AUDIOLOGY | Age: 34
End: 2024-07-05
Payer: COMMERCIAL

## 2024-07-05 ENCOUNTER — NUTRITION (OUTPATIENT)
Dept: NUTRITION | Facility: CLINIC | Age: 34
End: 2024-07-05

## 2024-07-05 DIAGNOSIS — Z71.3 NUTRITIONAL COUNSELING: Primary | ICD-10-CM

## 2024-07-05 DIAGNOSIS — H90.5 SENSORY HEARING LOSS, UNILATERAL: Primary | ICD-10-CM

## 2024-07-05 DIAGNOSIS — C62.11 SEMINOMA OF DESCENDED RIGHT TESTIS (HCC): ICD-10-CM

## 2024-07-05 DIAGNOSIS — H91.09 OTOTOXIC HEARING LOSS, UNSPECIFIED LATERALITY: ICD-10-CM

## 2024-07-05 PROCEDURE — 92557 COMPREHENSIVE HEARING TEST: CPT | Performed by: AUDIOLOGIST

## 2024-07-05 PROCEDURE — 92567 TYMPANOMETRY: CPT | Performed by: AUDIOLOGIST

## 2024-07-05 NOTE — PATIENT INSTRUCTIONS
Nutrition Rx & Recommendations:  Diet: High Calorie, High Protein (for high calorie foods see pages 52-53, and for high protein foods see pages 49-51 in your Eating Hints book)  Include protein at all meals/snacks.  Include a variety of foods (as tolerated/allowed by diet).  Follow a Cancer Preventative Nutrition Pattern: colorful, plant-based, low-fat, avoid added sugars, limit alcohol, include high fiber foods, limit processed meats, limit red meat, choose lean protein sources, use low-fat cooking methods, balance calories with physical activity, avoid excessive weight gain throughout life.  Incorporate physical activity as able/allowed.  Stay hydrated by sipping fluids of choice/tolerance throughout the day.  Alter food choices and eating patterns to accommodate changing needs.  Refer to Eating Hints book for other meal/snack ideas and symptom management.  Weigh yourself regularly. If you notice weight loss, make an effort to increase your daily food/calorie intake. If you continue to notice loss after these efforts, reach out to your dietitian to establish a plan to stabilize weight.  Make sure to add protein to your smoothies- ideas include: yogurt, peanut butter, protein powder, fairlife milk  Snack ideas that include protein:  Hard boiled eggs  High protein granola  Trail mix or nuts/seeds  Apple with peanut butter  String cheese  High protein yogurt (greek)    Follow Up Plan: PRN per patient request  Recommend Referral to Other Providers: none at this time

## 2024-07-05 NOTE — PROGRESS NOTES
Oncology Finance Advocacy Intake and Intervention  Oncology Finance Counselor/Advocate placed call to patient. This writer informed patient that this writer is here to assist patient with billing questions, financial assistance, payment/payment plans, quotes, copayment assistance, insurance optimization, and insurance navigation.    This writer conducted a thorough benefit review of copayment, deductible, and out of pocket cost. This information is documented below and has been reviewed with patient.     Copayment:NONE  Deductible:$5000/ $0 Remaining  Out of Pocket Cost:$7150/ $1952.07 Remaining  Insurance optimization (Limited benefit vs self-pay):NA  Patient assistance status:Co Pay Ast-Etoposide & Any PEND  Free Drug Applications:NA  Oral Chemo Application:NA    Interventions:  Writer spoke with Pt and spouse on 7/5/24 for introductions and reviewing current financial status. Writer successfully enrolled Pt in Co-pay card(Etoposide) with Pt consent. Writer and Pt also agreed to meet prior to Pt appointment on 7/15/24 @ [facility] for 8 AM to prepare documents for Any Care application. Writer did email FA fernando to Pt to begin filling out prior to meeting. Writer provided all contact information to Pt for any questions or concerns that may arise going forward.          Co-Pay Assistance Card Details  BIN: [id number]  GROUP: [id number]  RxPCN: [id number]  ID: [id number]  EFF: 7/5/24-12/31/24        Information above was review thoroughly with patient and patient was advise of possible assistance programs/interventions. If any question arise patient can contact this writer at below information. This information was given to patient at time of contact.      Jose L Cardenas  Phone:[phone]  Email:[email or web address]

## 2024-07-05 NOTE — PROGRESS NOTES
Diagnostic Hearing Evaluation    Name:  Ron Franco  :  1990  Age:  34 y.o.   MRN:  [id number]  Date of Evaluation: 24     HISTORY:     Reason for visit:  Ototoxic Monitoring    Ron Franco is being seen today at the request of Dr. Amezcua for an initial  evaluation of hearing for a baseline prior to chemotherapy treatments. Patient reports a previous hearing loss in the left ear which he feels has improved. Prior history of vertigo, tinnitus and hearing loss associated with a diagnosis of Meniere's Disease. Patient denies otalgia, otorrhea, dizziness, fullness, tinnitus, noise exposure, and family history of hearing loss.     EVALUATION:    Otoscopic Evaluation:   Right Ear: Unremarkable, canal clear   Left Ear: Unremarkable, canal clear    Tympanometry:   Right Ear: Type As, normal middle ear pressure with decreased static compliance, consistent with a hypomobile tympanic membrane.    Left Ear: Type As, normal middle ear pressure with decreased static compliance, consistent with a hypomobile tympanic membrane.     Speech Audiometry:  Speech Reception (SRT)    Right Ear: 0 dB HL    Left Ear: 5 dB HL    Word Recognition Scores (WRS):  Right Ear: excellent (100 % correct)     Left Ear: excellent (100 % correct)    Stimuli: [id number]    Pure Tone Audiometry:  Conventional pure tone audiometry from 250 - 8000 Hz  was obtained with good reliability and revealed the following:     Right Ear: Normal hearing sensitivity   Left Ear: Normal hearing sensitivity from 250-4000 Hz with a mild SNHL at 8000 Hz     High Frequency Audiometry:          9kHz      10kHz      11.2kHz       12.5kHz   Right Ear:   5dB 0dB      0dB  5dB   Left Ear:     40dB 50dB      55dB 65dB    *see attached audiogram    IMPRESSIONS:   Normal hearing sensitivity from 250-4000 Hz, mild sloping to moderately severe high frequency hearing loss in the left ear. Normal hearing sensitivity in the right ear.    RECOMMENDATIONS:  Return to [facility]. for F/U,  Copy to Patient/Caregiver, and Continue Ototoxic Monitoring as per physician orders    PATIENT EDUCATION:   The results of today's results and recommendations were reviewed with the patient and his hearing thresholds were explained at length. Treatment options, including amplification and communication strategies, were discussed as appropriate. The patient voiced understanding of his test results. Questions were addressed and the patient was encouraged to contact our department should concerns arise.      Sue Medina., CCC-A  Clinical Audiologist  [facility] AUDIOLOGY & HEARING AID CENTER  [facility] [place] [id number]

## 2024-07-05 NOTE — PROGRESS NOTES
Outpatient Oncology Nutrition Consultation   Type of Consult: Initial Consult  Care Location: Telephone Call    Reason for referral: Received notification by Dr. Amezcua on 6/26/24 that pt has triggered for oncology nutrition care (reason for referral: Ambulatory referral for nutrition services for oncology ).     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  S/p orchiectomy 5/24/24  Starting chemo 7/15/24 (cisplatin, etoposide)    Oncology History   Seminoma of descended right testis (HCC)   5/24/2024 -  Cancer Staged    Staging form: Testis, AJCC 8th Edition  - Pathologic stage from 5/24/2024: Stage IIIA (pT1b, cM1a, S0) - Signed by Shreyas Amezcua MD on 6/26/2024  Stage prefix: Initial diagnosis  Residual tumor (R): R0 - None       6/11/2024 Initial Diagnosis    Seminoma of descended right testis (HCC)     7/15/2024 -  Chemotherapy    alteplase (CATHFLO), 2 mg, Intracatheter, Every 1 Minute as needed, 0 of 4 cycles  CISplatin (PLATINOL) infusion, 20 mg/m2 = 38.2 mg, Intravenous, Once, 0 of 4 cycles  fosaprepitant (EMEND) IVPB, 150 mg, Intravenous, Once, 0 of 4 cycles  etoposide (TOPOSAR), 100 mg/m2 = 191 mg, Intravenous, Once, 0 of 4 cycles       Past Medical & Surgical Hx:   Patient Active Problem List   Diagnosis    HTN (hypertension)    Hyperlipidemia    CKD (chronic kidney disease), stage III    Esophageal reflux    Gout    Overweight    Tinnitus, left ear    Ingrown toenail of both feet    Meniere's disease    COVID-19    Seminoma of descended right testis (HCC)     Past Medical History:   Diagnosis Date    Hypertension     resolved    Meniere's disease     left sided    PONV (postoperative nausea and vomiting)     Single kidney     left    Vertigo      Past Surgical History:   Procedure Laterality Date    HERNIA REPAIR      IR PORT PLACEMENT  7/1/2024    NASAL SINUS SURGERY Left 06/2020    NEPHRECTOMY Right     CO ORCHIECTOMY RADICAL TUMOR  "INGUINAL APPROACH Right 5/24/2024    Procedure: ORCHIECTOMY INGUINAL;  Surgeon: Nathan Hamm MD;  Location: [facility] Main OR;  Service: Urology       Review of Medications:   Vitamins, Supplements and Herbals: Med List Reviewed & pt is only taking: apple cider vinegar. ,mg, b complex, vitamin d, fish oil, turmeric, Discussed reading supplement labels and making sure supplements have <100% of the daily value for all nutrients to avoid over supplementation, and Refered pt to [facility] \"About Herbs\" website for further information on safety/effectiveness/interactions of supplements    Current Outpatient Medications:     Apple Cider Vinegar 188 MG CAPS, Take by mouth, Disp: , Rfl:     Cholecalciferol (Vitamin D3) 125 MCG (5000 UT) TABS, Take 5,000 Units by mouth daily, Disp: , Rfl:     esomeprazole (NexIUM) 20 mg capsule, Take 20 mg by mouth every morning before breakfast (Patient not taking: Reported on 5/16/2024), Disp: , Rfl:     lidocaine-prilocaine (EMLA) cream, Apply topically as needed for mild pain, Disp: 50 g, Rfl: 1    magnesium 30 MG tablet, Take 30 mg by mouth 2 (two) times a day, Disp: , Rfl:     meclizine (ANTIVERT) 25 mg tablet, Take 1 tablet (25 mg total) by mouth every 8 (eight) hours as needed for dizziness (Patient not taking: Reported on 5/16/2024), Disp: 30 tablet, Rfl: 0    meloxicam (Mobic) 15 mg tablet, Take 1 tablet (15 mg total) by mouth daily (Patient not taking: Reported on 5/16/2024), Disp: 30 tablet, Rfl: 1    Mupirocin POWD, Use Add content of one cap to 240 ml saline. Irrigate each nostril with 120 ml of medicated saline BID (Patient not taking: Reported on 5/16/2024), Disp: , Rfl:     NON FORMULARY, Mometasone nasal irrigiation (Patient not taking: Reported on 6/11/2024), Disp: , Rfl:     NON FORMULARY, 2 (two) times a day nurpirocin nasal irrigation (Patient not taking: Reported on 6/11/2024), Disp: , Rfl:     NON FORMULARY, daily Vitamin B5 25mg (Patient not taking: Reported on 6/11/2024), " "Disp: , Rfl:     Omega-3 Fatty Acids (Fish Oil) 300 MG CAPS, Take by mouth, Disp: , Rfl:     ondansetron (ZOFRAN-ODT) 8 mg disintegrating tablet, Take 1 tablet (8 mg total) by mouth every 8 (eight) hours as needed for nausea or vomiting, Disp: 20 tablet, Rfl: 1    Pyridoxine HCl (VITAMIN B6 PO), Take by mouth, Disp: , Rfl:     triamterene-hydrochlorothiazide (DYAZIDE) 37.5-25 mg per capsule, Take 1 capsule by mouth every morning (Patient not taking: Reported on 5/16/2024), Disp: 90 capsule, Rfl: 0    Turmeric (QC TUMERIC COMPLEX PO), Take by mouth, Disp: , Rfl:     Most Recent Lab Results:   Lab Results   Component Value Date    WBC 4.85 06/25/2024    NEUTROABS 2.67 06/25/2024    CHOLESTEROL 253 (H) 08/13/2021    CHOL 280 (H) 12/16/2017    TRIG 153 (H) 08/13/2021    HDL 44 08/13/2021    LDLCALC 178 (H) 08/13/2021    ALT 71 (H) 06/25/2024    AST 43 (H) 06/25/2024    ALB 4.2 06/25/2024     12/16/2017     07/19/2016    SODIUM 139 06/25/2024    SODIUM 137 05/21/2024    K 3.7 06/25/2024    K 3.9 05/21/2024     06/25/2024    BUN 17 06/25/2024    BUN 19 05/21/2024    CREATININE 1.29 06/25/2024    CREATININE 1.52 (H) 05/21/2024    EGFR 71 06/25/2024    GLUF 82 06/25/2024    GLUF 87 08/13/2021    GLUC 90 05/21/2024    CALCIUM 9.5 06/25/2024       Anthropometric Measurements:   Height: 67\"  Ht Readings from Last 1 Encounters:   07/01/24 5' 7\" (1.702 m)     Wt Readings from Last 20 Encounters:   07/01/24 79.8 kg (176 lb)   06/26/24 79.8 kg (176 lb)   06/11/24 77.9 kg (171 lb 12.8 oz)   05/24/24 77.3 kg (170 lb 6.7 oz)   05/16/24 78.4 kg (172 lb 12.8 oz)   05/06/24 81.2 kg (179 lb)   11/28/23 74.8 kg (165 lb)   11/02/23 75.3 kg (166 lb)   02/14/22 80.6 kg (177 lb 12.8 oz)   11/17/21 78.8 kg (173 lb 12.8 oz)   10/05/21 77.4 kg (170 lb 9.6 oz)   08/19/21 80.7 kg (178 lb)   08/11/21 81.9 kg (180 lb 9.6 oz)   08/03/21 83.5 kg (184 lb)   03/10/21 86.4 kg (190 lb 6.4 oz)   03/03/21 88.4 kg (194 lb 12.8 oz) " "  02/20/21 86.2 kg (190 lb)   06/02/20 88 kg (194 lb)   05/28/20 88 kg (194 lb)   05/24/20 89 kg (196 lb 3.4 oz)       Weight History:   Usual Weight: 165-175#  Home Scale: (7/4/24) 175#    Oncology Nutrition-Anthropometrics      Flowsheet Row Nutrition from 7/5/2024 in [facility] Oncology Dietitian Services   Patient age (years): 34 years   Patient (male) height (in): 67 in   Current weight (lbs): 176 lbs   Current weight to be used for anthropometric calculations (kg) 80 kg   BMI: 27.6   IBW male 148 lb   IBW (kg) male 67.3 kg   IBW % (male) 118.9 %   Adjusted BW (male): 155 lbs   Adjusted BW in kg (male): 70.5 kg   % weight change after 1 month: 2.4 %   Weight change after 1 month (lbs) 4.2 lbs            Nutrition-Focused Physical Findings: n/a due to telephone call    Food/Nutrition-Related History & Client/Social History:    Current Nutrition Impact Symptoms:  [] Nausea  [] Reduced Appetite  [] Acid Reflux    [] Vomiting  [] Unintended Wt Loss  [] Malabsorption    [] Diarrhea  [] Unintended Wt Gain  [] Dumping Syndrome    [] Constipation  [] Thick Mucous/Secretions  [] Abdominal Pain    [] Dysgeusia (Altered Taste)  [] Xerostomia (Dry Mouth)  [] Gas    [] Dysosmia (Altered Smell)  [] Thrush  [] Difficulty Chewing    [] Oral Mucositis (Sore Mouth)  [] Fatigue  [] Hyperglycemia   No results found for: \"HGBA1C\"   [] Odynophagia  [] Esophagitis  [] Other:    [] Dysphagia  [] Early Satiety  [x] No Problems Eating      Food Allergies & Intolerances: no    Current Diet: Regular Diet, No Restrictions  Current Nutrition Intake: Unchanged from usual  Appetite: Good  Nutrition Route: PO  Oral Care: brushes once/day  Activity level: not currently working- hasn't been for a month since surgery. Used to exercise daily, and plans to start this up again once cleared after surgery    24 Hr Diet Recall:   Breakfast: scrambled eggs, toast, breakfast meat (this is usually breakfast at home, not every day)  Usually " will have a smoothie- greens mix, renetta, pineapple, banana, apple juice, water. Plans to start adding protein (yogurt), OR yogurt, bagel and fruit  Snack: granola  Lunch: apple and seasoned chicken breast OR cheese and salad  Snack: nothing  Dinner: usually a big meal- some sort of protein (chicken, beef, pork), sides  Snack: maybe some chips or cookies    Beverages: water (32oz x2), coffee (8oz x1), coke (12oz x1 at dinner)  Supplements:   Homemade Smoothies/Shakes -greens powder, fruit, apple juice. Plans to start adding protein like greek yogurt      Oncology Nutrition-Estimated Needs      Flowsheet Row Nutrition from 2024 in [facility] Oncology Dietitian Services   Weight type used Actual weight   Weight in kilograms (kg) used for estimated needs 80 kg   Energy needs formula:  30-35 kcal/kg   Energy needs based on 30 kcal/k kcal   Energy needs based on 35 kcal/k kcal   Protein needs formula: 1.2-1.5 g/kg   Protein needs based on 1.2 g/k g   Protein needs based on 1.5 g/kg 120 g   Fluid needs formula: 30-35 mL/kg   Fluid needs based on 30 mL/kg 2400 mL   Fluid needs in ounces 81 oz   Fluid needs based on 35 mL/kg 2800 mL   Fluid needs in ounces 95 oz             Discussion & Intervention:   Ron was evaluated today for an initial RD consultation regarding  patient request for what to eat .  Ron is planned to undergo tx for testicular cancer .  Spoke with Ron and his wife, Bernie, via phone today. Ron is eating well at this time and has not had any issues with his nutrition or weight. He will be starting chemo in about a week. He was interested in discussing his nutrition today to maximize his tolerance for treatment. We discussed a well balanced diet, what a cancer preventative diet looks like, importance of adequate calorie/protein intake during treatment and weight maintenance. He would like to exercise during tx as he reports he's always been an active person. We discussed  his protein needs and how to achieve this. He was appreciative of information discussed today.    Reviewed 24 hour recall, which revealed an adequate po intake, and discussed ways to optimize nutrient intake.  Also reviewed the importance of wt management throughout the tx process and the role of a high kcal/ high protein diet and a cancer preventative diet in managing wt and overall health.  Based on today's assessment, discussion included: MNT for: nutrition during cancer tx, how to modify foods for anticipated nutrition impact symptoms pt may experience during CA tx, a high kcal/protein diet & food choices to include at all meals & snacks (Examples of high kcal foods: cheese, full-fat dairy products, nut butter, plant-based fats, coconut oil/milk, avocado, butter, cream soup, etc. Examples of high protein foods: eggs, chicken, fish, beans/legumes, nuts/nut butters, bone broth, etc.) , choosing a variety of colorful, plant-based foods to support a cancer preventative diet, adequate hydration & fluid choices, having consistent and planned snacks between meals, and balancing energy intake with physical activity.   Moving forward, Ron was encouraged to add protein with snacks  .  Materials Provided: all e-mailed to pt (@ '[email or web address]'): , [facility] Eating Hints book, and Oncology Nutrition Class Flyer  All questions and concerns addressed during today’s visit.  Ron has RD contact information.    Nutrition Diagnosis:   Increased Nutrient Needs (kcal & pro) related to increased demand for nutrients and disease state as evidenced by cancer dx and pt undergoing tx for cancer.  Monitoring & Evaluation:   Goals:  weight maintenance/stabilization  adequate nutrition impact symptom management  pt to meet >/=75% estimated nutrition needs daily    Progress Towards Goals: Initiated    Nutrition Rx & Recommendations:  Diet: High Calorie, High Protein (for high calorie foods see pages 52-53, and for high protein foods see  pages 49-51 in your Eating Hints book)  Include protein at all meals/snacks.  Include a variety of foods (as tolerated/allowed by diet).  Follow a Cancer Preventative Nutrition Pattern: colorful, plant-based, low-fat, avoid added sugars, limit alcohol, include high fiber foods, limit processed meats, limit red meat, choose lean protein sources, use low-fat cooking methods, balance calories with physical activity, avoid excessive weight gain throughout life.  Incorporate physical activity as able/allowed.  Stay hydrated by sipping fluids of choice/tolerance throughout the day.  Alter food choices and eating patterns to accommodate changing needs.  Refer to Eating Hints book for other meal/snack ideas and symptom management.  Weigh yourself regularly. If you notice weight loss, make an effort to increase your daily food/calorie intake. If you continue to notice loss after these efforts, reach out to your dietitian to establish a plan to stabilize weight.  Make sure to add protein to your smoothies- ideas include: yogurt, peanut butter, protein powder, fairlife milk  Snack ideas that include protein:  Hard boiled eggs  High protein granola  Trail mix or nuts/seeds  Apple with peanut butter  String cheese  High protein yogurt (greek)    Follow Up Plan: PRN per patient request  Recommend Referral to Other Providers: none at this time

## 2024-07-05 NOTE — PROGRESS NOTES
Call out to A in regards to request for referral information, referral not needed. Reviewed orders from office are needed. Spoke with Ny, form faxed to [facility] office, filled out and reviewed with Dr. Amezcua. Faxed to [phone] confirmation received.

## 2024-07-09 DIAGNOSIS — C62.11 SEMINOMA OF DESCENDED RIGHT TESTIS (HCC): Primary | ICD-10-CM

## 2024-07-10 ENCOUNTER — TELEPHONE (OUTPATIENT)
Dept: HEMATOLOGY ONCOLOGY | Facility: CLINIC | Age: 34
End: 2024-07-10

## 2024-07-11 ENCOUNTER — TELEPHONE (OUTPATIENT)
Dept: INFUSION CENTER | Facility: CLINIC | Age: 34
End: 2024-07-11

## 2024-07-11 NOTE — TELEPHONE ENCOUNTER
Called patient to confirm appointment for 7/15/24 at 08:30 for chemotherapy. Discussed location of the infusion center, projected length of appointment, visitor policy, and availability of snacks, drinks, and WiFi. Provided call back number of [phone] with questions.

## 2024-07-12 ENCOUNTER — APPOINTMENT (OUTPATIENT)
Age: 34
End: 2024-07-12
Payer: COMMERCIAL

## 2024-07-12 DIAGNOSIS — C62.11 SEMINOMA OF DESCENDED RIGHT TESTIS (HCC): ICD-10-CM

## 2024-07-12 LAB
ALBUMIN SERPL BCG-MCNC: 4.1 G/DL (ref 3.5–5)
ALP SERPL-CCNC: 47 U/L (ref 34–104)
ALT SERPL W P-5'-P-CCNC: 69 U/L (ref 7–52)
ANION GAP SERPL CALCULATED.3IONS-SCNC: 9 MMOL/L (ref 4–13)
AST SERPL W P-5'-P-CCNC: 35 U/L (ref 13–39)
BASOPHILS # BLD AUTO: 0.02 THOUSANDS/ÂΜL (ref 0–0.1)
BASOPHILS NFR BLD AUTO: 0 % (ref 0–1)
BILIRUB SERPL-MCNC: 0.64 MG/DL (ref 0.2–1)
BUN SERPL-MCNC: 18 MG/DL (ref 5–25)
CALCIUM SERPL-MCNC: 9.7 MG/DL (ref 8.4–10.2)
CHLORIDE SERPL-SCNC: 101 MMOL/L (ref 96–108)
CO2 SERPL-SCNC: 29 MMOL/L (ref 21–32)
CREAT SERPL-MCNC: 1.2 MG/DL (ref 0.6–1.3)
EOSINOPHIL # BLD AUTO: 0.15 THOUSAND/ÂΜL (ref 0–0.61)
EOSINOPHIL NFR BLD AUTO: 3 % (ref 0–6)
ERYTHROCYTE [DISTWIDTH] IN BLOOD BY AUTOMATED COUNT: 11.3 % (ref 11.6–15.1)
GFR SERPL CREATININE-BSD FRML MDRD: 77 ML/MIN/1.73SQ M
GLUCOSE P FAST SERPL-MCNC: 72 MG/DL (ref 65–99)
HCT VFR BLD AUTO: 46.1 % (ref 36.5–49.3)
HGB BLD-MCNC: 16.1 G/DL (ref 12–17)
IMM GRANULOCYTES # BLD AUTO: 0.01 THOUSAND/UL (ref 0–0.2)
IMM GRANULOCYTES NFR BLD AUTO: 0 % (ref 0–2)
LYMPHOCYTES # BLD AUTO: 1.74 THOUSANDS/ÂΜL (ref 0.6–4.47)
LYMPHOCYTES NFR BLD AUTO: 31 % (ref 14–44)
MCH RBC QN AUTO: 31.4 PG (ref 26.8–34.3)
MCHC RBC AUTO-ENTMCNC: 34.9 G/DL (ref 31.4–37.4)
MCV RBC AUTO: 90 FL (ref 82–98)
MONOCYTES # BLD AUTO: 0.43 THOUSAND/ÂΜL (ref 0.17–1.22)
MONOCYTES NFR BLD AUTO: 8 % (ref 4–12)
NEUTROPHILS # BLD AUTO: 3.34 THOUSANDS/ÂΜL (ref 1.85–7.62)
NEUTS SEG NFR BLD AUTO: 58 % (ref 43–75)
NRBC BLD AUTO-RTO: 0 /100 WBCS
PLATELET # BLD AUTO: 195 THOUSANDS/UL (ref 149–390)
PMV BLD AUTO: 10.5 FL (ref 8.9–12.7)
POTASSIUM SERPL-SCNC: 3.9 MMOL/L (ref 3.5–5.3)
PROT SERPL-MCNC: 7 G/DL (ref 6.4–8.4)
RBC # BLD AUTO: 5.13 MILLION/UL (ref 3.88–5.62)
SODIUM SERPL-SCNC: 139 MMOL/L (ref 135–147)
WBC # BLD AUTO: 5.69 THOUSAND/UL (ref 4.31–10.16)

## 2024-07-12 PROCEDURE — 80053 COMPREHEN METABOLIC PANEL: CPT

## 2024-07-12 PROCEDURE — 85025 COMPLETE CBC W/AUTO DIFF WBC: CPT

## 2024-07-12 PROCEDURE — 36415 COLL VENOUS BLD VENIPUNCTURE: CPT

## 2024-07-15 ENCOUNTER — HOSPITAL ENCOUNTER (OUTPATIENT)
Dept: INFUSION CENTER | Facility: CLINIC | Age: 34
Discharge: HOME | End: 2024-07-15
Payer: COMMERCIAL

## 2024-07-15 ENCOUNTER — DOCUMENTATION (OUTPATIENT)
Dept: HEMATOLOGY ONCOLOGY | Facility: CLINIC | Age: 34
End: 2024-07-15

## 2024-07-15 VITALS
BODY MASS INDEX: 28.17 KG/M2 | RESPIRATION RATE: 18 BRPM | HEART RATE: 70 BPM | DIASTOLIC BLOOD PRESSURE: 82 MMHG | TEMPERATURE: 97.7 F | SYSTOLIC BLOOD PRESSURE: 128 MMHG | WEIGHT: 179.45 LBS | HEIGHT: 67 IN

## 2024-07-15 DIAGNOSIS — C62.11 SEMINOMA OF DESCENDED RIGHT TESTIS (HCC): Primary | ICD-10-CM

## 2024-07-15 LAB — MAGNESIUM SERPL-MCNC: 2.1 MG/DL (ref 1.9–2.7)

## 2024-07-15 PROCEDURE — 96361 HYDRATE IV INFUSION ADD-ON: CPT

## 2024-07-15 PROCEDURE — 83735 ASSAY OF MAGNESIUM: CPT | Performed by: INTERNAL MEDICINE

## 2024-07-15 PROCEDURE — 96417 CHEMO IV INFUS EACH ADDL SEQ: CPT

## 2024-07-15 PROCEDURE — 96413 CHEMO IV INFUSION 1 HR: CPT

## 2024-07-15 PROCEDURE — 96367 TX/PROPH/DG ADDL SEQ IV INF: CPT

## 2024-07-15 RX ORDER — SODIUM CHLORIDE 9 MG/ML
20 INJECTION, SOLUTION INTRAVENOUS ONCE
Status: COMPLETED | OUTPATIENT
Start: 2024-07-15 | End: 2024-07-15

## 2024-07-15 RX ADMIN — SODIUM CHLORIDE 500 ML: 0.9 INJECTION, SOLUTION INTRAVENOUS at 14:17

## 2024-07-15 RX ADMIN — SODIUM CHLORIDE 191 MG: 0.9 INJECTION, SOLUTION INTRAVENOUS at 13:14

## 2024-07-15 RX ADMIN — SODIUM CHLORIDE 20 ML/HR: 9 INJECTION, SOLUTION INTRAVENOUS at 09:57

## 2024-07-15 RX ADMIN — FOSAPREPITANT 150 MG: 150 INJECTION, POWDER, LYOPHILIZED, FOR SOLUTION INTRAVENOUS at 11:23

## 2024-07-15 RX ADMIN — SODIUM CHLORIDE 500 ML: 0.9 INJECTION, SOLUTION INTRAVENOUS at 09:52

## 2024-07-15 RX ADMIN — DEXAMETHASONE SODIUM PHOSPHATE: 100 INJECTION INTRAMUSCULAR; INTRAVENOUS at 10:58

## 2024-07-15 RX ADMIN — SODIUM CHLORIDE 38.2 MG: 0.9 INJECTION, SOLUTION INTRAVENOUS at 12:12

## 2024-07-15 NOTE — PROGRESS NOTES
Writer met with Pt and spouse (Bernie) on 7/15/24 8:00 AM @ [facility] to work collaboratively in filling out Any Care application. All pt and spouse questions were answered regarding current financial status. Pt and spouse will provide needed documentation to submit Any Care application within the coming days. Writer provided all contact information to Pt and spouse for future use.        Jose L Cardenas  Phone:[phone]  Email:[email or web address]

## 2024-07-15 NOTE — PROGRESS NOTES
Ron Franco  tolerated Cycle 1 Day 1 of Cisplatin + Etoposide well with no complications.      Ron Franco is aware of future appt on Tuesday Jul 16, 2024 8:30 AM.     AVS declined by Ron Franco.

## 2024-07-16 ENCOUNTER — DOCUMENTATION (OUTPATIENT)
Dept: HEMATOLOGY ONCOLOGY | Facility: CLINIC | Age: 34
End: 2024-07-16

## 2024-07-16 ENCOUNTER — HOSPITAL ENCOUNTER (OUTPATIENT)
Dept: INFUSION CENTER | Facility: CLINIC | Age: 34
Discharge: HOME | End: 2024-07-16
Payer: COMMERCIAL

## 2024-07-16 VITALS
TEMPERATURE: 97.2 F | SYSTOLIC BLOOD PRESSURE: 147 MMHG | BODY MASS INDEX: 28.44 KG/M2 | HEIGHT: 67 IN | HEART RATE: 87 BPM | DIASTOLIC BLOOD PRESSURE: 91 MMHG | WEIGHT: 181.22 LBS

## 2024-07-16 DIAGNOSIS — C62.11 SEMINOMA OF DESCENDED RIGHT TESTIS (HCC): Primary | ICD-10-CM

## 2024-07-16 PROCEDURE — 96361 HYDRATE IV INFUSION ADD-ON: CPT

## 2024-07-16 PROCEDURE — 96367 TX/PROPH/DG ADDL SEQ IV INF: CPT

## 2024-07-16 PROCEDURE — 96413 CHEMO IV INFUSION 1 HR: CPT

## 2024-07-16 PROCEDURE — 96417 CHEMO IV INFUS EACH ADDL SEQ: CPT

## 2024-07-16 RX ORDER — SODIUM CHLORIDE 9 MG/ML
20 INJECTION, SOLUTION INTRAVENOUS ONCE
Status: COMPLETED | OUTPATIENT
Start: 2024-07-16 | End: 2024-07-16

## 2024-07-16 RX ADMIN — SODIUM CHLORIDE 191 MG: 0.9 INJECTION, SOLUTION INTRAVENOUS at 11:43

## 2024-07-16 RX ADMIN — SODIUM CHLORIDE 20 ML/HR: 0.9 INJECTION, SOLUTION INTRAVENOUS at 08:58

## 2024-07-16 RX ADMIN — SODIUM CHLORIDE 38.2 MG: 0.9 INJECTION, SOLUTION INTRAVENOUS at 10:33

## 2024-07-16 RX ADMIN — SODIUM CHLORIDE 500 ML: 0.9 INJECTION, SOLUTION INTRAVENOUS at 09:20

## 2024-07-16 RX ADMIN — SODIUM CHLORIDE 500 ML: 0.9 INJECTION, SOLUTION INTRAVENOUS at 12:44

## 2024-07-16 RX ADMIN — DEXAMETHASONE SODIUM PHOSPHATE: 100 INJECTION INTRAMUSCULAR; INTRAVENOUS at 08:59

## 2024-07-16 NOTE — PROGRESS NOTES
Pt here for day 2 cycle 1 cisplatin/etoposide, tolerated well without complications, confirmed appt back tomorrow [id number]

## 2024-07-16 NOTE — PROGRESS NOTES
Writer submitted,via email, Pt [facility] application to [facility] on 7/16/24 @ 245 PM. Set for follow up in 31 days.          Jose L Cardenas  Phone:[phone]  Email:[email or web address]

## 2024-07-17 ENCOUNTER — PATIENT OUTREACH (OUTPATIENT)
Dept: CASE MANAGEMENT | Facility: HOSPITAL | Age: 34
End: 2024-07-17

## 2024-07-17 ENCOUNTER — HOSPITAL ENCOUNTER (OUTPATIENT)
Dept: INFUSION CENTER | Facility: CLINIC | Age: 34
Discharge: HOME | End: 2024-07-17
Payer: COMMERCIAL

## 2024-07-17 VITALS
TEMPERATURE: 98.2 F | BODY MASS INDEX: 28.37 KG/M2 | HEIGHT: 67 IN | HEART RATE: 87 BPM | RESPIRATION RATE: 18 BRPM | WEIGHT: 180.78 LBS | DIASTOLIC BLOOD PRESSURE: 82 MMHG | SYSTOLIC BLOOD PRESSURE: 152 MMHG

## 2024-07-17 DIAGNOSIS — C62.11 SEMINOMA OF DESCENDED RIGHT TESTIS (HCC): Primary | ICD-10-CM

## 2024-07-17 PROCEDURE — 96367 TX/PROPH/DG ADDL SEQ IV INF: CPT

## 2024-07-17 PROCEDURE — 96361 HYDRATE IV INFUSION ADD-ON: CPT

## 2024-07-17 PROCEDURE — 96413 CHEMO IV INFUSION 1 HR: CPT

## 2024-07-17 PROCEDURE — 96417 CHEMO IV INFUS EACH ADDL SEQ: CPT

## 2024-07-17 RX ORDER — SODIUM CHLORIDE 9 MG/ML
20 INJECTION, SOLUTION INTRAVENOUS ONCE
Status: COMPLETED | OUTPATIENT
Start: 2024-07-17 | End: 2024-07-17

## 2024-07-17 RX ADMIN — SODIUM CHLORIDE 500 ML: 9 INJECTION, SOLUTION INTRAVENOUS at 09:14

## 2024-07-17 RX ADMIN — SODIUM CHLORIDE 38.2 MG: 0.9 INJECTION, SOLUTION INTRAVENOUS at 10:42

## 2024-07-17 RX ADMIN — SODIUM CHLORIDE 20 ML/HR: 9 INJECTION, SOLUTION INTRAVENOUS at 09:13

## 2024-07-17 RX ADMIN — SODIUM CHLORIDE 191 MG: 0.9 INJECTION, SOLUTION INTRAVENOUS at 11:45

## 2024-07-17 RX ADMIN — SODIUM CHLORIDE 500 ML: 0.9 INJECTION, SOLUTION INTRAVENOUS at 12:50

## 2024-07-17 RX ADMIN — DEXAMETHASONE SODIUM PHOSPHATE: 100 INJECTION INTRAMUSCULAR; INTRAVENOUS at 10:13

## 2024-07-17 NOTE — PROGRESS NOTES
Ron Franco  tolerated treatment well with no complications.      Ron Franco is aware of future appt on Thursday Jul 18, 2024 8:30 AM.     AVS declined by Ron Franco.

## 2024-07-17 NOTE — PROGRESS NOTES
"OSW met with patient during his infusion treatment.     Patient reported that he continues to try and stay positive and tries to find the positive within each situation. Patient reported that he does listen to a podcast which he has felt has been helpful, \"The Mindset Driggs\" for him and trying to maintain positivity.  Patient reported that in reflecting on his current experience, he is able to focus time on a personal goal that he has been wanting to do during infusions. Patient reported that the staff has been helpful and positive for him. He does express a desire to give back, maybe help with fundraising for cancer driven causes.     Patient does have a 4 month old son and his wife that he has been able to spend time with during all of this which has been good as well.     Engaged patient in reminiscing/life review and discussion about future goals.     Patient is working with financial advocacy regarding medical bills which has been stressful for him and his wife. Patient reported that they are a single earner household and now that he isn't working much, they don't have much income coming in. Patient reported that his employer has been helpful and supportive. Patient reported that they have been allowing him to work from home when he feels up to it but that they aren't paying him the same as he was getting in a previous role. As a result of limited income coming in, they have been having to use their savings and credit cards. OSW discussed [employer] to assist with household bills to help them for a little bit and free up some funds for additional needs. OSW will also look into additional resources to assist.     Sent the following resources  Only Reusable Cloth Diaper Distribution for Children by: [employer] ([employer])  Next Steps:    Apply on their website, https://www.theclothoption.org/apply.      Call [phone] to get more info.   About: [employer] distributes new or gently used cloth " diapers and other reusable hygiene products to families in need. Newborns receive 20 cloth diaper changes, infants receive 15 changes, and potty training toddlers receive 10 changes.    This program provides:    - Cloth diapers     diapers are distributed as a loan and must be returned to the program before the next size up can be provided (larger sizes may be kept for as long as your family needs).    If you are pregnant or expecting an adoption or foster placement, applications will be reviewed no earlier than 45 days from the due date/expected placement. Diapers will be distributed within 30 days before the due date/expected placement.  Eligibility: This program helps people who are younger than 4 years old  Nearest location: 202.48 miles away.  [employer] [facility]  [address]  [place] [address]   [phone]  Hours:  :09:00 AM - 05:00 PM  Monday:09:00 AM - 06:00 PM  Tuesday:09:00 AM - 06:00 PM  Wednesday:09:00 AM - 06:00 PM  Thursday:09:00 AM - 06:00 PM  Friday:09:00 AM - 06:00 PM  Saturday:09:00 AM - 05:00 PM  Formula Assistance by: [employer]  Next Steps:    Apply on their website, [email or web address].   About: [employer] allows families in need of baby formula to connect with others who have formula to donate.    This program provides:    - Help obtaining baby formula  Eligibility: This program serves families who need baby formula.  Nearest location: 128.72 miles away.  [employer]  [address] [place] [address]   Hours:  Monday:08:00 AM - 05:00 PM  Tuesday:08:00 AM - 05:00 PM  Wednesday:08:00 AM - 05:00 PM  Thursday:08:00 AM - 05:00 PM  Friday:08:00 AM - 05:00 PM  Alaina Bhardwaj Vital Villa by: [employer] ([employer])  Next Steps:    Apply on their website, https://[email or web address].org/villa-application.   About: The villa program will assist those facing a significant financial hardship due to the  diagnosis of a chronic, terminal or rare condition such as cancer, Parkinson’s, Sanjeev’s Disease or many others.    This program provides:    - Utility assistance  - Grocery assistance    Please note, this adolph will only cover utilities OR groceries. Additionally, [employer] ([employer]) partners with organizations that might offer other assistance.  Eligibility: This program serves those facing a significant financial hardship due to the diagnosis of a chronic, terminal or rare condition such as cancer, Parkinson’s, Drybranch’s Disease or many others. Must select assistance with utilities OR groceries.  Nearest location: 47.47 miles away.  [employer]  [address] [place] [address]  [address]  [place] [address]   [phone]  Hours:  Monday:08:00 AM - 05:00 PM  Tuesday:08:00 AM - 05:00 PM  Wednesday:08:00 AM - 05:00 PM  Thursday:08:00 AM - 05:00 PM  Friday:08:00 AM - 05:00 PM  Rent and Utilities by: [facility]  Next Steps:    Call [phone] to get more info.      Apply on their website, https://[email or web address]/rent-utility-assistance-program-[email or web address]   About: [facility] provides rental and utility assistance to eligible individuals.    This program provides:    - Temporary financial assistance  - Mediation  - Advocacy  - Counseling  - Education programs  - Training programs  - Referrals to service partners  - Monitoring and coordination of services  - Habitability inspection  - Lead-based paint assessment of housing unit    This program operates while funds are available. Individuals must provide documentation and demonstrate a reasonable ability to regain self-sufficiency through temporary assistance.  Eligibility: This program serves individuals who demonstrate a reasonable ability to regain self-sufficiency through temporary assistance, Must be homeless or at risk of homelessness residing in [facility] outside [facility]  limits, AND Must meet income guidelines Must possess an eviction notice and/or utility shut off notice.  Nearest location: 13[place]06 [place] [address]   [phone]  Hours:  Monday:08:30 AM - 04:30 PM  Tuesday:08:30 AM - 06:00 PM  Wednesday:08:30 AM - 04:30 PM  Thursday:08:30 AM - 06:00 PM  OnTrack by: [employer]  Next Steps:    Apply on their website, [email or web address].   About: If you need help with your electric bill, you may qualify for some relief through the OnTrack program. OnTrack makes managing your bill easier with fixed monthly payments and debt forgiveness. If you have a past due balance, you may qualify for debt forgiveness.    This program provides:    - Help paying utilities    Your OnTrack credit is determined by your primary heating source as well as your household income.    This credit will be applied monthly to cover the difference in your actual bill and your fixed OnTrack payment. If your home is heated by electric, the maximum OnTrack credit you’ll receive, over 12 months, is $3,150. If your primary heating source is natural gas or oil, your maximum OnTrack credit will be $1,950.  Eligibility: Must be a [employer] Util[employer] customer. Must not exceed maximum annual income.  Nearest location: 13.23 miles away.  [facility]  [address] [place] [address]   [phone]  Hours:  Monday:08:00 AM - 05:00 PM  Tuesday:08:00 AM - 05:00 PM  Wednesday:08:00 AM - 05:00 PM  Thursday:08:00 AM - 05:00 PM  Friday:08:00 AM - 05:00 PM  Operation HELP by: [employer]  Next Steps:    Apply on their website, [email or web address]://[email or web address]/Ways-to-Save/Assistance-Programs/Operation-HELP.      Call [phone] to get more info.   About: [employer]'s Operation HELP program offers grants to low-income individuals and families to help pay for their  utilities.    This program provides:    - Help pay for utilities    Please apply online or call to learn more. [place] go directly to your utility bill. As funding is available, grants can help pay any energy bills.  Eligibility: This program helps people with income at or below 200% of federal poverty guidelines. Must be a [facility] Electric Utilities customer.  Nearest location: 13.23 miles away.  [facility]  [address] [place] [address]   [phone]  Hours:  Monday:08:00 AM - 05:00 PM  Tuesday:08:00 AM - 05:00 PM  Wednesday:08:00 AM - 05:00 PM  Thursday:08:00 AM - 05:00 PM  Friday:08:00 AM - 05:00 PM  [place] Family Villa Program by: [employer]  Next Steps:    Call [phone] to get more info.      Go to [email or web address] to get more info.   About: [employer] provides the [employer] Family Villa Program which provides financial and emotional support to local families with cancer. [employer] offers a one-time villa to pay essential household bills for families with cancer in the [facility].    This program provides:    - Financial Assistance  Eligibility: Must live in listed coverage areas. Must have a cancer diagnosis and be undergoing active treatment at the time of referral. Be referred by a member of their oncology team. Must be aware this is a one time villa. Must have a financial need that is directly related to their cancer diagnosis.  Nearest location: 33.45 miles away.  [facility]  [address]  [address]  [place] [address]   [phone]  Hours:  Monday:08:00 AM - 05:00 PM  Tuesday:08:00 AM - 05:00 PM  Wednesday:08:00 AM - 05:00 PM  Thursday:08:00 AM - 05:00 PM  Friday:08:00 AM - 05:00 PM  Financial Assistance by: [employer]  Next Steps:     About: [employer] assists families with children facing catastrophic health issues and financial hardship.    This program  provides:    - Help pay for utilities  - Help pay for housing  - Gas cards  - Grocery cards    Applications will take 2-3 weeks to be considered. Applications must come from hospital staff working with the family. Families may not apply for assistance themselves.    [employer] works through the child's medical team to verify authenticity and retain strict confidentiality of the situation presented. Therefore, they will no longer be able to accept phone calls, emails, or have personal contact from the applicant or family members pertaining to the application and/or outcome of said application.    It would be of great benefit to both the petitioner and [employer] if each request made be followed by an email confirming the arrival. Often times due to technology and security they do not receive the initial application and are unaware an application has been submitted. A quick email to check on its arrival saves time and allows them to process the application in a timely manner.  Eligibility: The child must be diagnosed with a catastrophic illness that requires the family to forego their normal schedule of support. Request for assistance is an obligation/need causing detriment to the family and care of child. (Past due utility, need for food, gas, mortgage, etc.)  Nearest location: 392.8 miles away.  [employer] - Mailing Address  P.ARMANDO Joyce [address] [place] [address] [phone]  Hours:  Monday:08:00 AM - 05:00 PM  Tuesday:08:00 AM - 05:00 PM  Wednesday:08:00 AM - 05:00 PM  Thursday:08:00 AM - 05:00 PM  Friday:08:00 AM - 05:00 PM  Low Income Household Water Assistance Program (LIHWAP) by: [facility] of Human Services  Next Steps:    Apply on their website, www.[facility].pa.us.      Call [phone] to get more info.   About: The Low-Income Household Water Assistance Program (LIHWAP) is a temporary emergency program to help low-income families pay overdue water bills.    This program  provides:    - Help paying for utilities     Eligibility: You must either rent OR own your home You must have an overdue water bill that you are responsible for paying Your household income must meet the income guidelines  Nearest location: 60.94 miles away.  [place] [employer]  [address] [place] [address]   [phone]  Hours:  Monday:08:00 AM - 05:00 PM  Tuesday:08:00 AM - 05:00 PM  Wednesday:08:00 AM - 05:00 PM  Thursday:08:00 AM - 05:00 PM  Friday:08:00 AM - 05:00 PM  H2O Help to Others Program by: [employer]  Next Steps:    Call [phone] to get more info.   About: The H2O Help to Others Program provides assistance for water and wastewater customers in an effort to provide financial relief to low-income clients.    This program provides:    - Financial assistance    Financial assistance is provided in the form of:    - [place] of up to $500 per year to be used toward household’s water bills  - An 85 percent discount on the monthly service fee (this is separate from the volumetric charge)  - Water-saving devices and education  Eligibility: Must be a customer of [employer] This program helps people with income at or below 200% of federal poverty guidelines. Must have made a sincere effort to pay the bill.  Nearest location: 223.01 miles away.  [facility]  [address] [place] [address]   [phone]  Hours:  Monday:08:00 AM - 05:00 PM  Tuesday:08:00 AM - 05:00 PM  Wednesday:08:00 AM - 05:00 PM  Thursday:08:00 AM - 05:00 PM  Friday:08:00 AM - 05:00 PM  Urban Greening Community Gardening Program by: [employer]  Next Steps:    Call [phone] ext. [phone] to get more info.      Email [email or web address] to get more info.   About: [employer] provides opportunities for residents to grow their own vegetables, herbs, and flowers. This program transforms vacant lots into healthy green community spaces and  gardens.    This program provides:    - Community gardens  Eligibility: Anyone in need can access this program.  Nearest location: 14.6 miles away.  [facility] [address]  [place] [address]   [phone]  Hours:  Monday:08:00 AM - 04:00 PM  Tuesday:08:00 AM - 04:00 PM  Wednesday:08:00 AM - 04:00 PM  Thursday:08:00 AM - 04:00 PM  Friday:08:00 AM - 04:00 PM  Food Assistance by: Elin Mccarthy  Next Steps:    Call [phone] ext. [phone] to get services.   About: Elin Mccarthy provides referrals to pantries, soup damon, mobile markets and meals in the community in [facility].    We provide:    - Food, SNAP assistance and meal program referrals    Additional programs include the Weekender Program for school-aged children, the Senior Food Totes Program SNAP, and the Produce 4 Kids Program. For specific program details, please visit their website or call Elin Mccarthy at [phone] ext. [phone]  Eligibility: This program helps people with income at or below 185% of federal poverty guidelines.  Nearest location: 16.91 miles away.  Elin Mccarthy  [facility] [place] [address]   [phone]  Hours:  Monday:08:00 AM - 05:00 PM  Tuesday:08:00 AM - 05:00 PM  Wednesday:08:00 AM - 05:00 PM  Thursday:08:00 AM - 05:00 PM  Friday:08:00 AM - 05:00 PM  Select Language

## 2024-07-18 ENCOUNTER — HOSPITAL ENCOUNTER (OUTPATIENT)
Dept: INFUSION CENTER | Facility: CLINIC | Age: 34
Discharge: HOME | End: 2024-07-18
Payer: COMMERCIAL

## 2024-07-18 VITALS
SYSTOLIC BLOOD PRESSURE: 146 MMHG | BODY MASS INDEX: 27.68 KG/M2 | RESPIRATION RATE: 18 BRPM | WEIGHT: 176.37 LBS | HEART RATE: 68 BPM | HEIGHT: 67 IN | TEMPERATURE: 97.3 F | DIASTOLIC BLOOD PRESSURE: 87 MMHG

## 2024-07-18 DIAGNOSIS — C62.11 SEMINOMA OF DESCENDED RIGHT TESTIS (HCC): Primary | ICD-10-CM

## 2024-07-18 PROCEDURE — 96417 CHEMO IV INFUS EACH ADDL SEQ: CPT

## 2024-07-18 PROCEDURE — 96361 HYDRATE IV INFUSION ADD-ON: CPT

## 2024-07-18 PROCEDURE — 96367 TX/PROPH/DG ADDL SEQ IV INF: CPT

## 2024-07-18 PROCEDURE — 96413 CHEMO IV INFUSION 1 HR: CPT

## 2024-07-18 RX ORDER — SODIUM CHLORIDE 9 MG/ML
20 INJECTION, SOLUTION INTRAVENOUS ONCE
Status: COMPLETED | OUTPATIENT
Start: 2024-07-18 | End: 2024-07-18

## 2024-07-18 RX ADMIN — SODIUM CHLORIDE 191 MG: 0.9 INJECTION, SOLUTION INTRAVENOUS at 11:24

## 2024-07-18 RX ADMIN — DEXAMETHASONE SODIUM PHOSPHATE: 100 INJECTION INTRAMUSCULAR; INTRAVENOUS at 09:04

## 2024-07-18 RX ADMIN — SODIUM CHLORIDE 500 ML: 0.9 INJECTION, SOLUTION INTRAVENOUS at 12:33

## 2024-07-18 RX ADMIN — SODIUM CHLORIDE 500 ML: 0.9 INJECTION, SOLUTION INTRAVENOUS at 08:59

## 2024-07-18 RX ADMIN — SODIUM CHLORIDE 20 ML/HR: 0.9 INJECTION, SOLUTION INTRAVENOUS at 09:06

## 2024-07-18 RX ADMIN — SODIUM CHLORIDE 38.2 MG: 0.9 INJECTION, SOLUTION INTRAVENOUS at 10:21

## 2024-07-18 NOTE — PROGRESS NOTES
Ron Franco  tolerated Day 4 of Cisplatin + Etoposide well with no complications.      Ron Franco is aware of future appt on Friday Jul 19, 2024 8:30 AM.     AVS declined by Ron Franco.

## 2024-07-19 ENCOUNTER — HOSPITAL ENCOUNTER (OUTPATIENT)
Dept: INFUSION CENTER | Facility: CLINIC | Age: 34
End: 2024-07-19
Payer: COMMERCIAL

## 2024-07-19 VITALS — BODY MASS INDEX: 28.1 KG/M2 | WEIGHT: 179.01 LBS | HEIGHT: 67 IN

## 2024-07-19 DIAGNOSIS — C62.11 SEMINOMA OF DESCENDED RIGHT TESTIS (HCC): Primary | ICD-10-CM

## 2024-07-19 PROCEDURE — 96361 HYDRATE IV INFUSION ADD-ON: CPT

## 2024-07-19 PROCEDURE — 96413 CHEMO IV INFUSION 1 HR: CPT

## 2024-07-19 PROCEDURE — 96417 CHEMO IV INFUS EACH ADDL SEQ: CPT

## 2024-07-19 PROCEDURE — 96367 TX/PROPH/DG ADDL SEQ IV INF: CPT

## 2024-07-19 RX ORDER — SODIUM CHLORIDE 9 MG/ML
20 INJECTION, SOLUTION INTRAVENOUS ONCE
Status: COMPLETED | OUTPATIENT
Start: 2024-07-19 | End: 2024-07-19

## 2024-07-19 RX ADMIN — SODIUM CHLORIDE 500 ML: 0.9 INJECTION, SOLUTION INTRAVENOUS at 12:07

## 2024-07-19 RX ADMIN — SODIUM CHLORIDE 20 ML/HR: 0.9 INJECTION, SOLUTION INTRAVENOUS at 08:58

## 2024-07-19 RX ADMIN — SODIUM CHLORIDE 500 ML: 0.9 INJECTION, SOLUTION INTRAVENOUS at 08:58

## 2024-07-19 RX ADMIN — CISPLATIN 38.2 MG: 1 INJECTION, SOLUTION INTRAVENOUS at 09:59

## 2024-07-19 RX ADMIN — SODIUM CHLORIDE 191 MG: 0.9 INJECTION, SOLUTION INTRAVENOUS at 11:03

## 2024-07-19 RX ADMIN — DEXAMETHASONE SODIUM PHOSPHATE: 100 INJECTION INTRAMUSCULAR; INTRAVENOUS at 09:00

## 2024-07-19 NOTE — PROGRESS NOTES
Pt tolerated day 5 of treatment without incident. Pt declined AVS, aware of next appt 8/5 and need for labs prior to that appt.

## 2024-07-26 ENCOUNTER — NURSE TRIAGE (OUTPATIENT)
Age: 34
End: 2024-07-26

## 2024-07-26 ENCOUNTER — HOSPITAL ENCOUNTER (OUTPATIENT)
Dept: INFUSION CENTER | Facility: CLINIC | Age: 34
End: 2024-07-26
Payer: COMMERCIAL

## 2024-07-26 VITALS
TEMPERATURE: 97.6 F | HEART RATE: 82 BPM | RESPIRATION RATE: 18 BRPM | SYSTOLIC BLOOD PRESSURE: 132 MMHG | DIASTOLIC BLOOD PRESSURE: 86 MMHG

## 2024-07-26 DIAGNOSIS — R11.2 CHEMOTHERAPY INDUCED NAUSEA AND VOMITING: Primary | ICD-10-CM

## 2024-07-26 DIAGNOSIS — C62.11 SEMINOMA OF DESCENDED RIGHT TESTIS (HCC): Primary | ICD-10-CM

## 2024-07-26 DIAGNOSIS — R11.2 CHEMOTHERAPY INDUCED NAUSEA AND VOMITING: ICD-10-CM

## 2024-07-26 DIAGNOSIS — T45.1X5A CHEMOTHERAPY INDUCED NAUSEA AND VOMITING: Primary | ICD-10-CM

## 2024-07-26 DIAGNOSIS — T45.1X5A CHEMOTHERAPY INDUCED NAUSEA AND VOMITING: ICD-10-CM

## 2024-07-26 PROCEDURE — 96374 THER/PROPH/DIAG INJ IV PUSH: CPT

## 2024-07-26 RX ORDER — PROCHLORPERAZINE MALEATE 10 MG
10 TABLET ORAL EVERY 6 HOURS PRN
Qty: 30 TABLET | Refills: 1 | Status: SHIPPED | OUTPATIENT
Start: 2024-07-26

## 2024-07-26 RX ADMIN — ONDANSETRON 16 MG: 2 INJECTION INTRAMUSCULAR; INTRAVENOUS at 12:27

## 2024-07-26 RX ADMIN — SODIUM CHLORIDE 1000 ML: 0.9 INJECTION, SOLUTION INTRAVENOUS at 12:04

## 2024-07-26 NOTE — TELEPHONE ENCOUNTER
Telephone call spoke with Bernie.  Pt has been having n/v off and on since last tx on 7/19.  Started feeling better but since yesterday not able to eat or drinking enough.  Has been taking Zofran as rx.  Per Dr Amezcua, Hydration 1000 ml and Zofran 16mg IV today at [facility] 1130.  Also Compazine 10 mg PO q 6 hrs for n/v being sent to his [place] pharmacy.  Do not take both.  Hold the Zofran and try the compazine.  Call our office with update, also encouraged not to tough anything out call and report any issues.  Bernie stated she understands.

## 2024-07-26 NOTE — PROGRESS NOTES
Patient tolerates hydration and Zofran without incident. Patient declined AVS but is aware of future appointment on 8/5/24 at 8:30AM.

## 2024-07-26 NOTE — TELEPHONE ENCOUNTER
Pt stated Oncology Provider:  Dr Amezcua    Actionable item:  Callback from office    What is the reason for the call/chief complaint?    Patients wife called in for patient. Stated that patient has been having severe nausea over the past week with vomiting and stomach pain. Was taking Zofran and was helping but now Zofran is making patient nauseous as well. Was not able to eat the beginning of the week, but yesterday was able to eat and drink with nausea afterwards. Today pt is still having severe nausea. Denies any other symptoms. Wife requesting a call back on what else can be done for patients nausea on her cell phone.       Reason for Disposition  • Treatment changes not effective within six hours    Answer Assessment - Initial Assessment Questions  3. Obtain a history of the problem. Please describe your symptoms in detail, including severity, precipitating/relieving factors, onset and duration. What color is the emesis? Is there any blood or coffee-ground emesis?  Having severe nausea nd vomiting with stomach pain, able to have food intake and hydration but it makes him very nauseous  5. What is your food and fluid intake over the past 24 hours? Any associated symptoms such as signs of dehydration (e.g., decreased urine output, fever, thirst, dry mucous membranes, weakness, dizziness, confusion)? When was the last time patient urinated?  Has been able to have food intake yesterday after days of not being able to eat but still having severe nausea    Protocols used: ONC-Nausea and Vomiting-ADULT-OH

## 2024-07-29 ENCOUNTER — PATIENT MESSAGE (OUTPATIENT)
Dept: HEMATOLOGY ONCOLOGY | Facility: CLINIC | Age: 34
End: 2024-07-29

## 2024-07-29 DIAGNOSIS — C62.11 SEMINOMA OF DESCENDED RIGHT TESTIS (HCC): Primary | ICD-10-CM

## 2024-07-29 RX ORDER — SODIUM CHLORIDE 9 MG/ML
20 INJECTION, SOLUTION INTRAVENOUS ONCE
OUTPATIENT
Start: 2024-08-06

## 2024-07-29 RX ORDER — SODIUM CHLORIDE 9 MG/ML
20 INJECTION, SOLUTION INTRAVENOUS ONCE
OUTPATIENT
Start: 2024-08-07

## 2024-07-29 RX ORDER — SODIUM CHLORIDE 9 MG/ML
20 INJECTION, SOLUTION INTRAVENOUS ONCE
OUTPATIENT
Start: 2024-08-08

## 2024-07-29 RX ORDER — SODIUM CHLORIDE 9 MG/ML
20 INJECTION, SOLUTION INTRAVENOUS ONCE
OUTPATIENT
Start: 2024-08-05

## 2024-07-29 RX ORDER — SODIUM CHLORIDE 9 MG/ML
20 INJECTION, SOLUTION INTRAVENOUS ONCE
OUTPATIENT
Start: 2024-08-09

## 2024-08-02 ENCOUNTER — APPOINTMENT (OUTPATIENT)
Dept: LAB | Facility: CLINIC | Age: 34
End: 2024-08-02
Payer: COMMERCIAL

## 2024-08-02 ENCOUNTER — APPOINTMENT (OUTPATIENT)
Age: 34
End: 2024-08-02
Payer: COMMERCIAL

## 2024-08-02 DIAGNOSIS — C62.11 SEMINOMA OF DESCENDED RIGHT TESTIS (HCC): ICD-10-CM

## 2024-08-02 LAB
ALBUMIN SERPL BCG-MCNC: 4 G/DL (ref 3.5–5)
ALP SERPL-CCNC: 47 U/L (ref 34–104)
ALT SERPL W P-5'-P-CCNC: 32 U/L (ref 7–52)
ANION GAP SERPL CALCULATED.3IONS-SCNC: 5 MMOL/L (ref 4–13)
AST SERPL W P-5'-P-CCNC: 28 U/L (ref 13–39)
BASOPHILS # BLD AUTO: 0 THOUSANDS/ÂΜL (ref 0–0.1)
BASOPHILS NFR BLD AUTO: 0 % (ref 0–1)
BILIRUB SERPL-MCNC: 0.67 MG/DL (ref 0.2–1)
BUN SERPL-MCNC: 10 MG/DL (ref 5–25)
CALCIUM SERPL-MCNC: 9.3 MG/DL (ref 8.4–10.2)
CHLORIDE SERPL-SCNC: 100 MMOL/L (ref 96–108)
CO2 SERPL-SCNC: 31 MMOL/L (ref 21–32)
CREAT SERPL-MCNC: 1.24 MG/DL (ref 0.6–1.3)
EOSINOPHIL # BLD AUTO: 0.01 THOUSAND/ÂΜL (ref 0–0.61)
EOSINOPHIL NFR BLD AUTO: 1 % (ref 0–6)
ERYTHROCYTE [DISTWIDTH] IN BLOOD BY AUTOMATED COUNT: 11.5 % (ref 11.6–15.1)
GFR SERPL CREATININE-BSD FRML MDRD: 74 ML/MIN/1.73SQ M
GLUCOSE P FAST SERPL-MCNC: 81 MG/DL (ref 65–99)
HCT VFR BLD AUTO: 37.1 % (ref 36.5–49.3)
HGB BLD-MCNC: 13.4 G/DL (ref 12–17)
IMM GRANULOCYTES # BLD AUTO: 0.01 THOUSAND/UL (ref 0–0.2)
IMM GRANULOCYTES NFR BLD AUTO: 1 % (ref 0–2)
LYMPHOCYTES # BLD AUTO: 1.02 THOUSANDS/ÂΜL (ref 0.6–4.47)
LYMPHOCYTES NFR BLD AUTO: 59 % (ref 14–44)
MAGNESIUM SERPL-MCNC: 2.1 MG/DL (ref 1.9–2.7)
MCH RBC QN AUTO: 30.9 PG (ref 26.8–34.3)
MCHC RBC AUTO-ENTMCNC: 36.1 G/DL (ref 31.4–37.4)
MCV RBC AUTO: 86 FL (ref 82–98)
MONOCYTES # BLD AUTO: 0.43 THOUSAND/ÂΜL (ref 0.17–1.22)
MONOCYTES NFR BLD AUTO: 25 % (ref 4–12)
NEUTROPHILS # BLD AUTO: 0.23 THOUSANDS/ÂΜL (ref 1.85–7.62)
NEUTS SEG NFR BLD AUTO: 14 % (ref 43–75)
NRBC BLD AUTO-RTO: 0 /100 WBCS
PLATELET # BLD AUTO: 156 THOUSANDS/UL (ref 149–390)
PMV BLD AUTO: 9.6 FL (ref 8.9–12.7)
POTASSIUM SERPL-SCNC: 3.8 MMOL/L (ref 3.5–5.3)
PROT SERPL-MCNC: 6.6 G/DL (ref 6.4–8.4)
RBC # BLD AUTO: 4.33 MILLION/UL (ref 3.88–5.62)
SODIUM SERPL-SCNC: 136 MMOL/L (ref 135–147)
WBC # BLD AUTO: 1.7 THOUSAND/UL (ref 4.31–10.16)

## 2024-08-02 PROCEDURE — 85025 COMPLETE CBC W/AUTO DIFF WBC: CPT

## 2024-08-02 PROCEDURE — 36415 COLL VENOUS BLD VENIPUNCTURE: CPT

## 2024-08-02 PROCEDURE — 80053 COMPREHEN METABOLIC PANEL: CPT

## 2024-08-02 PROCEDURE — 83735 ASSAY OF MAGNESIUM: CPT

## 2024-08-05 ENCOUNTER — HOSPITAL ENCOUNTER (OUTPATIENT)
Dept: INFUSION CENTER | Facility: CLINIC | Age: 34
Discharge: HOME | End: 2024-08-05

## 2024-08-05 ENCOUNTER — TELEPHONE (OUTPATIENT)
Dept: HEMATOLOGY ONCOLOGY | Facility: CLINIC | Age: 34
End: 2024-08-05

## 2024-08-05 DIAGNOSIS — C62.11 SEMINOMA OF DESCENDED RIGHT TESTIS (HCC): Primary | ICD-10-CM

## 2024-08-05 DIAGNOSIS — C62.11 SEMINOMA OF DESCENDED RIGHT TESTIS (HCC): ICD-10-CM

## 2024-08-05 NOTE — TELEPHONE ENCOUNTER
Please defer treatment one week from today.  Plan updated. Left vm on patient phone and reviewed with BRIAN Larsen at [facility] that patient needs to be deferred one week with new labs on Saturday.

## 2024-08-05 NOTE — PROGRESS NOTES
Patient did not receive Audrey's message prior to coming to infusion center. Per recommendation, we are going to defer patient one week and have him repeat his labs on Friday/Saturday or this week. Patient educated to avoid sick individuals and wear a mask when going into public places. Patient verbalized understanding of all education provided. Aware we will place him on schedule for next week when we find a time. Left unit in stable condition.

## 2024-08-05 NOTE — TELEPHONE ENCOUNTER
Call out to Leah Mane and she stated that patient is scheduled and reviewed for week of 8/12. She asked further about labor day week. Plan sent to Dr. Amezcua to further review.

## 2024-08-05 NOTE — PROGRESS NOTES
Per Dr. Amezcua due to infusion availability okay to have cycle 3 week after labor day. Message sent to infusion staff. Plan updated.

## 2024-08-05 NOTE — PROGRESS NOTES
Call out to patient, reviewed labs are not within parameters for treatment today. Recommendation was for patient to go to hospital lab for STAT labs or to have treatment deferred one week with new labs. Left vm with recommendations.    Attempted to call [facility] x4. Left vm on [facility] line.

## 2024-08-06 NOTE — TELEPHONE ENCOUNTER
Per Dr. Amezcua patient is able to have treatment a week post Labor day due to infusion availability

## 2024-08-09 ENCOUNTER — APPOINTMENT (OUTPATIENT)
Age: 34
End: 2024-08-09
Payer: COMMERCIAL

## 2024-08-09 DIAGNOSIS — C62.11 SEMINOMA OF DESCENDED RIGHT TESTIS (HCC): ICD-10-CM

## 2024-08-09 LAB
ALBUMIN SERPL BCG-MCNC: 3.8 G/DL (ref 3.5–5)
ALP SERPL-CCNC: 51 U/L (ref 34–104)
ALT SERPL W P-5'-P-CCNC: 66 U/L (ref 7–52)
ANION GAP SERPL CALCULATED.3IONS-SCNC: 6 MMOL/L (ref 4–13)
AST SERPL W P-5'-P-CCNC: 43 U/L (ref 13–39)
BASOPHILS # BLD AUTO: 0.02 THOUSANDS/ÂΜL (ref 0–0.1)
BASOPHILS NFR BLD AUTO: 0 % (ref 0–1)
BILIRUB SERPL-MCNC: 0.46 MG/DL (ref 0.2–1)
BUN SERPL-MCNC: 9 MG/DL (ref 5–25)
CALCIUM SERPL-MCNC: 9.4 MG/DL (ref 8.4–10.2)
CHLORIDE SERPL-SCNC: 101 MMOL/L (ref 96–108)
CO2 SERPL-SCNC: 31 MMOL/L (ref 21–32)
CREAT SERPL-MCNC: 1.33 MG/DL (ref 0.6–1.3)
EOSINOPHIL # BLD AUTO: 0.02 THOUSAND/ÂΜL (ref 0–0.61)
EOSINOPHIL NFR BLD AUTO: 0 % (ref 0–6)
ERYTHROCYTE [DISTWIDTH] IN BLOOD BY AUTOMATED COUNT: 13.2 % (ref 11.6–15.1)
GFR SERPL CREATININE-BSD FRML MDRD: 68 ML/MIN/1.73SQ M
GLUCOSE P FAST SERPL-MCNC: 146 MG/DL (ref 65–99)
HCT VFR BLD AUTO: 38.2 % (ref 36.5–49.3)
HGB BLD-MCNC: 13.4 G/DL (ref 12–17)
IMM GRANULOCYTES # BLD AUTO: 0.03 THOUSAND/UL (ref 0–0.2)
IMM GRANULOCYTES NFR BLD AUTO: 1 % (ref 0–2)
LYMPHOCYTES # BLD AUTO: 1.27 THOUSANDS/ÂΜL (ref 0.6–4.47)
LYMPHOCYTES NFR BLD AUTO: 25 % (ref 14–44)
MAGNESIUM SERPL-MCNC: 1.9 MG/DL (ref 1.9–2.7)
MCH RBC QN AUTO: 31.6 PG (ref 26.8–34.3)
MCHC RBC AUTO-ENTMCNC: 35.1 G/DL (ref 31.4–37.4)
MCV RBC AUTO: 90 FL (ref 82–98)
MONOCYTES # BLD AUTO: 0.53 THOUSAND/ÂΜL (ref 0.17–1.22)
MONOCYTES NFR BLD AUTO: 10 % (ref 4–12)
NEUTROPHILS # BLD AUTO: 3.31 THOUSANDS/ÂΜL (ref 1.85–7.62)
NEUTS SEG NFR BLD AUTO: 64 % (ref 43–75)
NRBC BLD AUTO-RTO: 0 /100 WBCS
PLATELET # BLD AUTO: 315 THOUSANDS/UL (ref 149–390)
PMV BLD AUTO: 10 FL (ref 8.9–12.7)
POTASSIUM SERPL-SCNC: 4.2 MMOL/L (ref 3.5–5.3)
PROT SERPL-MCNC: 6.5 G/DL (ref 6.4–8.4)
RBC # BLD AUTO: 4.24 MILLION/UL (ref 3.88–5.62)
SODIUM SERPL-SCNC: 138 MMOL/L (ref 135–147)
WBC # BLD AUTO: 5.18 THOUSAND/UL (ref 4.31–10.16)

## 2024-08-09 PROCEDURE — 36415 COLL VENOUS BLD VENIPUNCTURE: CPT

## 2024-08-09 PROCEDURE — 83735 ASSAY OF MAGNESIUM: CPT

## 2024-08-09 PROCEDURE — 80053 COMPREHEN METABOLIC PANEL: CPT

## 2024-08-09 PROCEDURE — 85025 COMPLETE CBC W/AUTO DIFF WBC: CPT

## 2024-08-11 NOTE — PROGRESS NOTES
Telemedicine consent    Patient: Ron Franco  Provider: Shreyas Amezcua MD  Provider located at [address] [place] [address] [facility] [address] [id number]    The patient was identified by name and date of birth. Ron Franco was informed that this is a telemedicine visit and that the visit is being conducted through the "[employer] platform. He agrees to proceed..  My office door was closed. No one else was in the room.  He acknowledged consent and understanding of privacy and security of the video platform. The patient has agreed to participate and understands they can discontinue the visit at any time.    Patient is aware this is a billable service.                                    Hematology/Oncology Outpatient Office Note    Date of Service: 2024    [facility] [address] [address] [phone]    Reason for Consultation:   No chief complaint on file.      Cancer Stage at diagnosis: IIIA    Referral Physician: No ref. provider found    Primary Care Physician:  No primary care provider on file.     Nickname: Ron    Spouse: Bernie    Has a 4 month old son    Original ECO     Today's ECO    Goals and Barriers:  Current Goal: Minimize effects of disease burden, extend life.   Barriers to accomplishing this: None    Patient's Capacity to Self Care:  Patient is able to self care    ASSESSMENT & PLAN      Diagnosis ICD-10-CM Associated Orders   1. Seminoma of descended right testis (HCC)  C62.11       2. Chemotherapy induced nausea and vomiting  R11.2     T45.1X5A             This is a 34 y.o. 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 I reviewed relevant office notes, imaging reports and pathology result as well.  I told the patient that this is a case of curable disease and what this means. We discussed that the goal of anti-cancer therapy is to provide best quality of life, extend overall survival, and progression free survival as shown in clinical trials.   I explained the risks/benefits of the proposed cancer therapy: Cisplatin + Etoposide and after discussion including understanding risks of possible life-threatening complications and therapy-related malignancy development, informed consent for blood products and treatment has been signed and obtained.  TNM/Staging At Diagnosis  Cancer Staging   Seminoma of descended right testis (HCC)  Staging form: Testis, AJCC 8th Edition  - Pathologic stage from 5/24/2024: Stage Unknown (pT1b, cN2c, cM1a, S0) - Signed by Shreyas Amezcua MD on 6/23/2024  Stage prefix: Initial diagnosis  Residual tumor (R): R0 - None  [id number] (underlying LN)  Disease Features/Tumor Markers/Genetics  Tumor Marker: AFP, LDH, HCT WNL  6/25/2024: HCG, AFP, LDH WNL  Notable Path Features:   5/24/2024: Right testicle and spermatic cord (radical inguinal orchiectomy):     - Seminoma (4.5 cm).      - No definitive lymph-vascular invasion.     - Tunica vaginalis negative for tumor.     - Surgical margin negative for tumor  Treatment: Cisplatin + Etoposide   Other Supportive care: Emla, Zofran  Treatment Team Members  Surgeon: Dr. Noris Mauro Onc  Palliative  Labs  Diagnostics  6/10/2024 CT CAP w/c: Multiple pulmonary nodules. No prior study for comparison. Based on current Fleischner Society 2017 Guidelines on incidental pulmonary nodule, patients with a known malignancy are at increased risk of metastasis and should receive  initial three-month   follow-up chest CT. Mediastinal and bilateral hilar lymphadenopathy concerning for metastatic disease given the patient's history.  Bilateral hilar and mediastinal lymphadenopathy. Right hilar lymph node measures 2.9 x 2.1 cm. Subcarinal lymph node is 2.5 x 1.4 cm. Pretracheal lymph node is 2.2 x 1.1 cm. Left hilar lymph node is 1.3 x 1.1 cm.     Discussion of decision making    I personally reviewed the following lab results, the image studies, pathology, other specialty/physicians consult notes and recommendations, and outside medical records. I had a lengthy discussion with the patient and shared the work-up findings. We discussed the diagnosis and management plan as below. I spent 42 minutes reviewing the records (labs, clinician notes, outside records, medical history, ordering medicine/tests/procedures, monitoring of anti-neoplastic toxicities, interpreting the imaging/labs previously done) and coordination of care as well as direct time with the patient today, of which greater than 50% of the time was spent in counseling and coordination of care with the patient/family.      Plan/Labs  Treatment would consist of Cisplatin 20 mg/m2 D1-5 + Etoposide 100 mg/m2 D1-5 for 4 cycles (good risk Stage III pure seminoma), consent being obtained today  C2 coming up with preceding labs  Adding hydration Monday after chemo for next 3 cycles  Consider Neulasta if G3 Neutropenia occurs again  Restaging CT CAP w/c as we get closer to C4  Zofran 8 mg ODT prn nausea and Compazine q6 h are ordered    Follow Up: q3 weeks scheduled thru 9/23/2024    All questions were answered to the patient's satisfaction during this encounter. The patient knows the contact information for our office and knows to reach out for any relevant concerns related to this encounter. They are to call for any temperature 100.4 or higher, new symptoms including but not restricted to shaking chills, decreased appetite, nausea, vomiting,  diarrhea, increased fatigue, shortness of breath or chest pain, confusion, and not feeling the strength to come to the clinic. For all other listed problems and medical diagnosis in their chart - they are managed by PCP and/or other specialists, which the patient acknowledges. Thank you very much for your consultation and making us a part of this patient's care. We are continuing to follow closely with you. Please do not hesitate to reach out to me with any additional questions or concerns.    Shreyas Amezcua MD  Hematology & Medical Oncology Staff Physician             Disclaimer: This document was prepared using [employer] technology. If a word or phrase is confusing, or does not make sense, this is likely due to recognition error which was not discovered during this clinician's review. If you believe an error has occurred, please contact me through [employer] service line for kirill?cation.      ONCOLOGY HISTORY OF PRESENT ILLNESS        Oncology History   Seminoma of descended right testis (HCC)   5/24/2024 -  Cancer Staged    Staging form: Testis, AJCC 8th Edition  - Pathologic stage from 5/24/2024: Stage IIIA (pT1b, cM1a, S0) - Signed by Shreyas Amezcua MD on 6/26/2024  Stage prefix: Initial diagnosis  Residual tumor (R): R0 - None       6/11/2024 Initial Diagnosis    Seminoma of descended right testis (HCC)     7/15/2024 -  Chemotherapy    alteplase (CATHFLO), 2 mg, Intracatheter, Every 1 Minute as needed, 2 of 4 cycles  CISplatin (PLATINOL) infusion, 20 mg/m2 = 38.2 mg, Intravenous, Once, 2 of 4 cycles  Administration: 38.2 mg (7/15/2024), 38.2 mg (7/16/2024), 38.2 mg (7/17/2024), 38.2 mg (7/18/2024), 38.2 mg (7/19/2024)  fosaprepitant (EMEND) IVPB, 150 mg, Intravenous, Once, 2 of 4 cycles  Administration: 150 mg (7/15/2024)  etoposide (TOPOSAR), 100 mg/m2 = 191 mg, Intravenous, Once, 2 of 4 cycles  Administration: 191 mg (7/15/2024), 191 mg (7/16/2024), 191 mg (7/17/2024), 191 mg (7/18/2024),  191 mg (7/19/2024)           SUBJECTIVE  (INTERVAL HISTORY)      Clotting History None   Bleeding History None   Cancer History R Testicular   Family Cancer History None   H/O Blood/Plt Transfusion None   Tobacco/etoh/drug abuse 1 PPD x 3 years, quit at age 26, no etoh abuse, does recreational marijuana           Occupation  at a [employer]     Pain: none    He had several days of nausea with the past chemo. Things improved after the Compazine.    I have reviewed the relevant past medical, surgical, social and family history. I have also reviewed allergies and medications for this patient.    Review of Systems  Baseline weight: 165-175 lbs    Denies F/C, N/V, SOB, CP, HA, rash, itching, gen weakness, melena, hematuria, hematochezia, falls, diarrhea, or constipation       A 10-point review of system was performed, pertinent positive and negative were detailed as above. Otherwise, the 10-point review of system was negative.      Past Medical History:   Diagnosis Date    Hypertension     resolved    Meniere's disease     left sided    PONV (postoperative nausea and vomiting)     Single kidney     left    Vertigo        Past Surgical History:   Procedure Laterality Date    HERNIA REPAIR      IR PORT PLACEMENT  7/1/2024    NASAL SINUS SURGERY Left 06/2020    NEPHRECTOMY Right     AL ORCHIECTOMY RADICAL TUMOR INGUINAL APPROACH Right 5/24/2024    Procedure: ORCHIECTOMY INGUINAL;  Surgeon: Nathan Hamm MD;  Location: [facility];  Service: Urology       Family History   Problem Relation Age of Onset    Hypertension Mother     Heart disease Father         cardiac pacemaker    Hypertension Father        Social History     Socioeconomic History    Marital status: /Civil Union     Spouse name: Not on file    Number of children: Not on file    Years of education: Not on file    Highest education level: Not on file   Occupational History    Not on file   Tobacco Use    Smoking status: Former      Current packs/day: 0.00     Types: Cigarettes     Quit date: 2018     Years since quittin.9     Passive exposure: Past    Smokeless tobacco: Never   Vaping Use    Vaping status: Never Used   Substance and Sexual Activity    Alcohol use: Not Currently    Drug use: Yes     Frequency: 7.0 times per week     Types: Marijuana     Comment: vidhi 24    Sexual activity: Not on file   Other Topics Concern    Not on file   Social History Narrative    Denied: Always uses seat belt    Caffeine use: one 16 oz cups of coffee, 1 cup of soda    Does not exercise     Social Determinants of Health     Financial Resource Strain: Not on file   Food Insecurity: Not on file   Transportation Needs: Not on file   Physical Activity: Not on file   Stress: Not on file   Social Connections: Not on file   Intimate Partner Violence: Not on file   Housing Stability: Not on file       No Known Allergies    Current Outpatient Medications   Medication Sig Dispense Refill    Apple Cider Vinegar 188 MG CAPS Take by mouth      Cholecalciferol (Vitamin D3) 125 MCG (5000 UT) TABS Take 5,000 Units by mouth daily      esomeprazole (NexIUM) 20 mg capsule Take 20 mg by mouth every morning before breakfast (Patient not taking: Reported on 2024)      lidocaine-prilocaine (EMLA) cream Apply topically as needed for mild pain 50 g 1    magnesium 30 MG tablet Take 30 mg by mouth 2 (two) times a day      meclizine (ANTIVERT) 25 mg tablet Take 1 tablet (25 mg total) by mouth every 8 (eight) hours as needed for dizziness (Patient not taking: Reported on 2024) 30 tablet 0    meloxicam (Mobic) 15 mg tablet Take 1 tablet (15 mg total) by mouth daily (Patient not taking: Reported on 2024) 30 tablet 1    Mupirocin POWD Use Add content of one cap to 240 ml saline. Irrigate each nostril with 120 ml of medicated saline BID (Patient not taking: Reported on 2024)      NON FORMULARY Mometasone nasal irrigiation (Patient not taking: Reported on  6/11/2024)      NON FORMULARY 2 (two) times a day nurpirocin nasal irrigation (Patient not taking: Reported on 6/11/2024)      NON FORMULARY daily Vitamin B5 25mg (Patient not taking: Reported on 6/11/2024)      Omega-3 Fatty Acids (Fish Oil) 300 MG CAPS Take by mouth      ondansetron (ZOFRAN-ODT) 8 mg disintegrating tablet Take 1 tablet (8 mg total) by mouth every 8 (eight) hours as needed for nausea or vomiting 20 tablet 1    prochlorperazine (COMPAZINE) 10 mg tablet Take 1 tablet (10 mg total) by mouth every 6 (six) hours as needed for nausea or vomiting 30 tablet 1    Pyridoxine HCl (VITAMIN B6 PO) Take by mouth      triamterene-hydrochlorothiazide (DYAZIDE) 37.5-25 mg per capsule Take 1 capsule by mouth every morning (Patient not taking: Reported on 5/16/2024) 90 capsule 0    Turmeric (QC TUMERIC COMPLEX PO) Take by mouth       No current facility-administered medications for this visit.     Facility-Administered Medications Ordered in Other Visits   Medication Dose Route Frequency Provider Last Rate Last Admin    alteplase (CATHFLO) injection 2 mg  2 mg Intracatheter Q1MIN PRN Shreyas Amezcua MD        CISplatin (PLATINOL) 38.2 mg in sodium chloride 0.9 % 500 mL infusion  20 mg/m2 (Treatment Plan Recorded) Intravenous Once Shreyas Amezcua .2 mL/hr at 08/12/24 1106 38.2 mg at 08/12/24 1106    etoposide (TOPOSAR) 191 mg in sodium chloride 0.9 % 500 mL chemo infusion  100 mg/m2 (Treatment Plan Recorded) Intravenous Once Shreyas Amezcua MD        sodium chloride 0.9 % bolus 500 mL  500 mL Intravenous Once Shreyas Amezcua MD           (Not in a hospital admission)      Objective:     24 Hour Vitals Assessment:     There were no vitals filed for this visit.    Below not updated as this was a telemed visit    PHYSICIAN EXAM:    General: Appearance: alert, cooperative, no distress.  HEENT: Normocephalic, atraumatic. No scleral icterus. conjunctivae clear. EOMI.  Chest: No tenderness to palpation. No  open wound noted.  Lungs: Clear to auscultation bilaterally, Respirations unlabored.  Cardiac: Regular rate and rhythm, +S1and S2, -r/m/g  Abdomen: Soft, non-tender, non-distended. Bowel sounds are normal.  Extremities:  No edema, cyanosis, clubbing.  Skin: Skin color, turgor are normal. No rashes.  Lymphatics: no palpable supra-cervical, axillary, or inguinal adenopathy  Neurologic: Awake, Alert, and oriented, no gross focal deficits noted b/l.       DATA REVIEW:    Pathology Result:    Final Diagnosis   Date Value Ref Range Status   05/24/2024   Final    A.  Right testicle and spermatic cord (radical inguinal orchiectomy):     - Seminoma (4.5 cm).      - No definitive lymph-vascular invasion.     - Tunica vaginalis negative for tumor.     - Surgical margin negative for tumor.          Image Results:   Image result are reviewed and documented in Hematology/Oncology history    IR port placement  Narrative: Examination: Venous port placement.  Venous access with ultrasound guidance, tunneled port insertion under fluoroscopic guidance.    HISTORY: Infusion of chemotherapy  Patient with history of testicular cancer    PROCEDURE: Informed consent was obtained.  The right chest and neck was prepped and draped in usual sterile fashion.  Timeout was performed.  Lidocaine was given a as local anesthesia.    Ultrasound guidance was used to cannulate the right internal jugular    Using fluoroscopic guidance, a wire was advanced centrally.    Lidocaine was then given over the chest wall.  An incision was made in the chest/upper arm, and a pocket was created using a combination of sharp, and blunt dissection.  The port was inserted into the pocket.  The catheter was tunneled subcutaneously to   the venotomy site, and trimmed to appropriate length.  The catheter was advanced via a peel-away sheath, into the vein, under fluoroscopic guidance.    The incision was closed in layers.  Glue was applied to the surface of the skin, and  the venotomy site.    FINDINGS: Ultrasound shows a widely patent vein.  It was compressible and free of thrombus.  Fluoroscopy shows final catheter position of: Cavoatrial region    Power injection compatible: Yes  Impression: Technically successful placement of venous access port    This is the end of the clinically relevant portion of the report.  Subsequent information is for compliance, documentation, and coding purposes.    In the process of informed consent, information was communicated, verbally, to the patient regarding the procedure.  The patient was informed of; the name of the procedure, nature of the procedure, nature of the condition, risks, benefits, alternatives,   and potential complications, as well as the consequences of not having the procedure.  All the patient's questions were answered.  Informed consent was signed.  Quoted risks included infection, and venous thrombosis.    Automated exposure control was utilized, and there was minimize, in accordance with the application of the ALARA technique.    Chlorhexidine and alcohol was used for cleansing and sterile preparation of the skin.  This was allowed to dry prior to the application of sterile draping.    Timeout was performed, with all team members present, and in agreement.  Confirmation of patient, procedure, laterally, allergies, and all needed equipment was performed.    The patient was examined, and is in satisfactory condition for planned moderate sedation.  Mallampati score: 2  Intravenous conscious sedation was provided.  There was continuous monitoring of blood pressure, heart rate, respiratory rate, and oxygen saturation by an independent, trained observer.  Conscious sedation time: 60 minutes  Versed dose: 1 Milligrams  Fentanyl dose: 100 Micrograms  After the procedure, the patient was recovered, and return to their baseline status, and was deemed fit for discharge from .  Fluoroscopy time:  0.5    minutes  Radiation dose: 12.2  "MilliGray    PROCEDURE: Port placement  Preprocedure diagnosis: Please see \"history \", above  Postprocedure diagnosis: Same  Antibiotics: Ancef 2 g  Specimens: None  Estimated blood loss: Less than 5 mL    Anesthesia: Local and monitored intravenous conscious sedation    Maximal sterile barrier technique, according to current guidelines, were utilized.  These include, but are not limited to: Hat, mask, and shoe covers for all staff.  Sterile gown and gloves for all operators.  A sterile cover was used for the ultrasound   probe.  The patient was covered, from head to toe, in sterile drapes.    Ultrasound was used to evaluate the above-named access vein as a potential access site.  It was compressible and free of thrombus.  Static and real-time images of needle entry were obtained, and archived.    Workstation performed: [id number]      LABS:  Lab data are reviewed and documented in HemOnc history.       Lab Results   Component Value Date    HGB 13.4 08/09/2024    HCT 38.2 08/09/2024    MCV 90 08/09/2024     08/09/2024    WBC 5.18 08/09/2024    NRBC 0 08/09/2024     Lab Results   Component Value Date     12/16/2017    K 4.2 08/09/2024     08/09/2024    CO2 31 08/09/2024    BUN 9 08/09/2024    CREATININE 1.33 (H) 08/09/2024    GLUF 146 (H) 08/09/2024    CALCIUM 9.4 08/09/2024    AST 43 (H) 08/09/2024    ALT 66 (H) 08/09/2024    ALKPHOS 51 08/09/2024    PROT 7.3 12/16/2017    BILITOT 1.0 12/16/2017    EGFR 68 08/09/2024       No results found for: \"IRON\", \"TIBC\", \"FERRITIN\"    Lab Results   Component Value Date    KDQKCIKQ94 400 02/14/2022       No results for input(s): \"WBC\", \"CREAT\", \"PLT\" in the last 72 hours.      By:  Shreyas Amezcua MD, 8/12/2024, 11:47 AM                                  "

## 2024-08-12 ENCOUNTER — HOSPITAL ENCOUNTER (OUTPATIENT)
Dept: INFUSION CENTER | Facility: HOSPITAL | Age: 34
Discharge: HOME | End: 2024-08-12
Attending: INTERNAL MEDICINE
Payer: COMMERCIAL

## 2024-08-12 ENCOUNTER — TELEPHONE (OUTPATIENT)
Dept: HEMATOLOGY ONCOLOGY | Facility: CLINIC | Age: 34
End: 2024-08-12

## 2024-08-12 ENCOUNTER — TELEMEDICINE (OUTPATIENT)
Dept: HEMATOLOGY ONCOLOGY | Facility: CLINIC | Age: 34
End: 2024-08-12
Payer: COMMERCIAL

## 2024-08-12 VITALS
HEIGHT: 67 IN | HEART RATE: 98 BPM | TEMPERATURE: 97.7 F | SYSTOLIC BLOOD PRESSURE: 106 MMHG | BODY MASS INDEX: 26.44 KG/M2 | WEIGHT: 168.43 LBS | DIASTOLIC BLOOD PRESSURE: 72 MMHG | RESPIRATION RATE: 20 BRPM

## 2024-08-12 DIAGNOSIS — N18.31 STAGE 3A CHRONIC KIDNEY DISEASE (HCC): ICD-10-CM

## 2024-08-12 DIAGNOSIS — C62.11 SEMINOMA OF DESCENDED RIGHT TESTIS (HCC): ICD-10-CM

## 2024-08-12 DIAGNOSIS — R11.2 CHEMOTHERAPY INDUCED NAUSEA AND VOMITING: ICD-10-CM

## 2024-08-12 DIAGNOSIS — R11.2 CHEMOTHERAPY INDUCED NAUSEA AND VOMITING: Primary | ICD-10-CM

## 2024-08-12 DIAGNOSIS — T45.1X5A CHEMOTHERAPY INDUCED NAUSEA AND VOMITING: ICD-10-CM

## 2024-08-12 DIAGNOSIS — C62.11 SEMINOMA OF DESCENDED RIGHT TESTIS (HCC): Primary | ICD-10-CM

## 2024-08-12 DIAGNOSIS — T45.1X5A CHEMOTHERAPY INDUCED NAUSEA AND VOMITING: Primary | ICD-10-CM

## 2024-08-12 PROCEDURE — 96413 CHEMO IV INFUSION 1 HR: CPT

## 2024-08-12 PROCEDURE — 99215 OFFICE O/P EST HI 40 MIN: CPT | Performed by: INTERNAL MEDICINE

## 2024-08-12 PROCEDURE — 96417 CHEMO IV INFUS EACH ADDL SEQ: CPT

## 2024-08-12 PROCEDURE — 96361 HYDRATE IV INFUSION ADD-ON: CPT

## 2024-08-12 PROCEDURE — 96367 TX/PROPH/DG ADDL SEQ IV INF: CPT

## 2024-08-12 RX ORDER — SODIUM CHLORIDE 9 MG/ML
20 INJECTION, SOLUTION INTRAVENOUS ONCE
Status: COMPLETED | OUTPATIENT
Start: 2024-08-12 | End: 2024-08-12

## 2024-08-12 RX ADMIN — ETOPOSIDE 191 MG: 20 INJECTION INTRAVENOUS at 12:26

## 2024-08-12 RX ADMIN — DEXAMETHASONE SODIUM PHOSPHATE: 10 INJECTION, SOLUTION INTRAMUSCULAR; INTRAVENOUS at 09:48

## 2024-08-12 RX ADMIN — SODIUM CHLORIDE 20 ML/HR: 0.9 INJECTION, SOLUTION INTRAVENOUS at 09:43

## 2024-08-12 RX ADMIN — CISPLATIN 38.2 MG: 1 INJECTION, SOLUTION INTRAVENOUS at 11:06

## 2024-08-12 RX ADMIN — FOSAPREPITANT 150 MG: 150 INJECTION, POWDER, LYOPHILIZED, FOR SOLUTION INTRAVENOUS at 10:10

## 2024-08-12 RX ADMIN — SODIUM CHLORIDE 500 ML: 0.9 INJECTION, SOLUTION INTRAVENOUS at 09:44

## 2024-08-12 RX ADMIN — SODIUM CHLORIDE 500 ML: 0.9 INJECTION, SOLUTION INTRAVENOUS at 13:39

## 2024-08-12 NOTE — PROGRESS NOTES
Pt tolerated Day 1 cycle 2 today with no adverse reactions. AVS declined next apt 8/13/24 @ 0900 [facility]. Left unit ambulatory with a steady gait.

## 2024-08-12 NOTE — PROGRESS NOTES
Please schedule patient on the Monday post treatment cycles.   8/19, 9/16, 10/7    Plan in place.

## 2024-08-13 ENCOUNTER — HOSPITAL ENCOUNTER (OUTPATIENT)
Dept: INFUSION CENTER | Facility: HOSPITAL | Age: 34
Discharge: HOME | End: 2024-08-13
Attending: INTERNAL MEDICINE
Payer: COMMERCIAL

## 2024-08-13 VITALS
BODY MASS INDEX: 26.44 KG/M2 | WEIGHT: 168.43 LBS | TEMPERATURE: 98.5 F | RESPIRATION RATE: 18 BRPM | OXYGEN SATURATION: 96 % | HEIGHT: 67 IN | HEART RATE: 88 BPM

## 2024-08-13 DIAGNOSIS — C62.11 SEMINOMA OF DESCENDED RIGHT TESTIS (HCC): Primary | ICD-10-CM

## 2024-08-13 PROCEDURE — 96417 CHEMO IV INFUS EACH ADDL SEQ: CPT

## 2024-08-13 PROCEDURE — 96413 CHEMO IV INFUSION 1 HR: CPT

## 2024-08-13 PROCEDURE — 96361 HYDRATE IV INFUSION ADD-ON: CPT

## 2024-08-13 PROCEDURE — 96367 TX/PROPH/DG ADDL SEQ IV INF: CPT

## 2024-08-13 RX ORDER — SODIUM CHLORIDE 9 MG/ML
20 INJECTION, SOLUTION INTRAVENOUS ONCE
Status: COMPLETED | OUTPATIENT
Start: 2024-08-13 | End: 2024-08-13

## 2024-08-13 RX ADMIN — SODIUM CHLORIDE 20 ML/HR: 0.9 INJECTION, SOLUTION INTRAVENOUS at 09:22

## 2024-08-13 RX ADMIN — CISPLATIN 38.2 MG: 1 INJECTION, SOLUTION INTRAVENOUS at 10:28

## 2024-08-13 RX ADMIN — ETOPOSIDE 191 MG: 20 INJECTION INTRAVENOUS at 11:32

## 2024-08-13 RX ADMIN — DEXAMETHASONE SODIUM PHOSPHATE: 10 INJECTION, SOLUTION INTRAMUSCULAR; INTRAVENOUS at 09:37

## 2024-08-13 RX ADMIN — SODIUM CHLORIDE 500 ML: 0.9 INJECTION, SOLUTION INTRAVENOUS at 12:39

## 2024-08-13 RX ADMIN — SODIUM CHLORIDE 500 ML: 0.9 INJECTION, SOLUTION INTRAVENOUS at 09:22

## 2024-08-13 NOTE — PROGRESS NOTES
Ron Franco  tolerated treatment well with no complications.      Ron Franco is aware of future appt on 8/14 at 0800.     AVS printed and given to Ron Franco:    No (Declined by Ron Franco)

## 2024-08-14 ENCOUNTER — HOSPITAL ENCOUNTER (OUTPATIENT)
Dept: INFUSION CENTER | Facility: HOSPITAL | Age: 34
Discharge: HOME | End: 2024-08-14
Attending: INTERNAL MEDICINE
Payer: COMMERCIAL

## 2024-08-14 VITALS
DIASTOLIC BLOOD PRESSURE: 96 MMHG | TEMPERATURE: 97.9 F | BODY MASS INDEX: 26.44 KG/M2 | SYSTOLIC BLOOD PRESSURE: 139 MMHG | OXYGEN SATURATION: 97 % | HEIGHT: 67 IN | HEART RATE: 53 BPM | RESPIRATION RATE: 18 BRPM | WEIGHT: 168.43 LBS

## 2024-08-14 DIAGNOSIS — C62.11 SEMINOMA OF DESCENDED RIGHT TESTIS (HCC): ICD-10-CM

## 2024-08-14 DIAGNOSIS — C62.11 SEMINOMA OF DESCENDED RIGHT TESTIS (HCC): Primary | ICD-10-CM

## 2024-08-14 PROCEDURE — 96361 HYDRATE IV INFUSION ADD-ON: CPT

## 2024-08-14 PROCEDURE — 96417 CHEMO IV INFUS EACH ADDL SEQ: CPT

## 2024-08-14 PROCEDURE — 96367 TX/PROPH/DG ADDL SEQ IV INF: CPT

## 2024-08-14 PROCEDURE — 96413 CHEMO IV INFUSION 1 HR: CPT

## 2024-08-14 RX ORDER — PROCHLORPERAZINE MALEATE 10 MG
10 TABLET ORAL EVERY 6 HOURS PRN
Qty: 30 TABLET | Refills: 1 | Status: SHIPPED | OUTPATIENT
Start: 2024-08-14

## 2024-08-14 RX ORDER — SODIUM CHLORIDE 9 MG/ML
20 INJECTION, SOLUTION INTRAVENOUS ONCE
Status: COMPLETED | OUTPATIENT
Start: 2024-08-14 | End: 2024-08-14

## 2024-08-14 RX ADMIN — SODIUM CHLORIDE 500 ML: 0.9 INJECTION, SOLUTION INTRAVENOUS at 08:33

## 2024-08-14 RX ADMIN — ETOPOSIDE 191 MG: 20 INJECTION INTRAVENOUS at 10:42

## 2024-08-14 RX ADMIN — DEXAMETHASONE SODIUM PHOSPHATE: 10 INJECTION, SOLUTION INTRAMUSCULAR; INTRAVENOUS at 08:36

## 2024-08-14 RX ADMIN — CISPLATIN 38.2 MG: 1 INJECTION, SOLUTION INTRAVENOUS at 09:40

## 2024-08-14 RX ADMIN — SODIUM CHLORIDE 20 ML/HR: 0.9 INJECTION, SOLUTION INTRAVENOUS at 08:31

## 2024-08-14 RX ADMIN — SODIUM CHLORIDE 500 ML: 0.9 INJECTION, SOLUTION INTRAVENOUS at 11:48

## 2024-08-14 NOTE — PROGRESS NOTES
Ron Franco  tolerated treatment well with no complications.      Ron Franco is aware of future appt on 8/15/2024 at 0800.     AVS printed and given to Ron Franco:   No (Declined by Ron Franco)

## 2024-08-15 ENCOUNTER — HOSPITAL ENCOUNTER (OUTPATIENT)
Dept: INFUSION CENTER | Facility: HOSPITAL | Age: 34
Discharge: HOME | End: 2024-08-15
Attending: INTERNAL MEDICINE
Payer: COMMERCIAL

## 2024-08-15 ENCOUNTER — TELEPHONE (OUTPATIENT)
Dept: HEMATOLOGY ONCOLOGY | Facility: CLINIC | Age: 34
End: 2024-08-15

## 2024-08-15 VITALS
TEMPERATURE: 97.7 F | WEIGHT: 169.09 LBS | SYSTOLIC BLOOD PRESSURE: 126 MMHG | HEIGHT: 67 IN | RESPIRATION RATE: 17 BRPM | DIASTOLIC BLOOD PRESSURE: 83 MMHG | BODY MASS INDEX: 26.54 KG/M2 | HEART RATE: 57 BPM | OXYGEN SATURATION: 97 %

## 2024-08-15 DIAGNOSIS — C62.11 SEMINOMA OF DESCENDED RIGHT TESTIS (HCC): Primary | ICD-10-CM

## 2024-08-15 PROCEDURE — 96367 TX/PROPH/DG ADDL SEQ IV INF: CPT

## 2024-08-15 PROCEDURE — 96413 CHEMO IV INFUSION 1 HR: CPT

## 2024-08-15 PROCEDURE — 96417 CHEMO IV INFUS EACH ADDL SEQ: CPT

## 2024-08-15 PROCEDURE — 96361 HYDRATE IV INFUSION ADD-ON: CPT

## 2024-08-15 RX ORDER — SODIUM CHLORIDE 9 MG/ML
20 INJECTION, SOLUTION INTRAVENOUS ONCE
Status: COMPLETED | OUTPATIENT
Start: 2024-08-15 | End: 2024-08-15

## 2024-08-15 RX ADMIN — SODIUM CHLORIDE 20 ML/HR: 9 INJECTION, SOLUTION INTRAVENOUS at 08:24

## 2024-08-15 RX ADMIN — SODIUM CHLORIDE 500 ML: 0.9 INJECTION, SOLUTION INTRAVENOUS at 11:42

## 2024-08-15 RX ADMIN — SODIUM CHLORIDE 500 ML: 0.9 INJECTION, SOLUTION INTRAVENOUS at 08:24

## 2024-08-15 RX ADMIN — CISPLATIN 38.2 MG: 1 INJECTION, SOLUTION INTRAVENOUS at 09:31

## 2024-08-15 RX ADMIN — ETOPOSIDE 191 MG: 20 INJECTION INTRAVENOUS at 10:35

## 2024-08-15 RX ADMIN — DEXAMETHASONE SODIUM PHOSPHATE: 10 INJECTION, SOLUTION INTRAMUSCULAR; INTRAVENOUS at 08:25

## 2024-08-15 NOTE — TELEPHONE ENCOUNTER
Writer left Pt voicemail on 8/15/24 @ 2:55 PM to provide FA application update provided by [facility] team. Writer informed Pt that a letter was sent to Pt requesting additional documentation for FA application. Writer inquiring if Pt received anything yet or perhaps it was still in transit. Writer encouraged a call back from Pt to touch base on the matter. Writer provided all contact information to Pt for future use.          Jose L Cardenas  Phone:[phone]  Email:[email or web address]

## 2024-08-15 NOTE — PROGRESS NOTES
Ron Franco  tolerated treatment well with no complications.      Ron Franco is aware of future appt on 8/16/24 at 0800.     AVS printed and given to Ron Franco:  No (Declined by Ron Franco)

## 2024-08-16 ENCOUNTER — HOSPITAL ENCOUNTER (OUTPATIENT)
Dept: INFUSION CENTER | Facility: HOSPITAL | Age: 34
End: 2024-08-16
Attending: INTERNAL MEDICINE
Payer: COMMERCIAL

## 2024-08-16 VITALS
OXYGEN SATURATION: 98 % | WEIGHT: 169.09 LBS | RESPIRATION RATE: 16 BRPM | SYSTOLIC BLOOD PRESSURE: 128 MMHG | TEMPERATURE: 98.2 F | HEART RATE: 62 BPM | BODY MASS INDEX: 26.54 KG/M2 | DIASTOLIC BLOOD PRESSURE: 88 MMHG | HEIGHT: 67 IN

## 2024-08-16 DIAGNOSIS — C62.11 SEMINOMA OF DESCENDED RIGHT TESTIS (HCC): Primary | ICD-10-CM

## 2024-08-16 PROCEDURE — 96361 HYDRATE IV INFUSION ADD-ON: CPT

## 2024-08-16 PROCEDURE — 96417 CHEMO IV INFUS EACH ADDL SEQ: CPT

## 2024-08-16 PROCEDURE — 96367 TX/PROPH/DG ADDL SEQ IV INF: CPT

## 2024-08-16 PROCEDURE — 96413 CHEMO IV INFUSION 1 HR: CPT

## 2024-08-16 RX ORDER — SODIUM CHLORIDE 9 MG/ML
20 INJECTION, SOLUTION INTRAVENOUS ONCE
Status: COMPLETED | OUTPATIENT
Start: 2024-08-16 | End: 2024-08-16

## 2024-08-16 RX ADMIN — DEXAMETHASONE SODIUM PHOSPHATE: 10 INJECTION, SOLUTION INTRAMUSCULAR; INTRAVENOUS at 08:30

## 2024-08-16 RX ADMIN — CISPLATIN 38.2 MG: 1 INJECTION, SOLUTION INTRAVENOUS at 09:31

## 2024-08-16 RX ADMIN — SODIUM CHLORIDE 500 ML: 0.9 INJECTION, SOLUTION INTRAVENOUS at 11:41

## 2024-08-16 RX ADMIN — SODIUM CHLORIDE 500 ML: 0.9 INJECTION, SOLUTION INTRAVENOUS at 08:30

## 2024-08-16 RX ADMIN — ETOPOSIDE 191 MG: 20 INJECTION INTRAVENOUS at 10:37

## 2024-08-16 RX ADMIN — SODIUM CHLORIDE 20 ML/HR: 0.9 INJECTION, SOLUTION INTRAVENOUS at 08:30

## 2024-08-16 NOTE — PROGRESS NOTES
Infusions tolerated well. No complaints offered. AVS provided. Pt is aware that future appts are at other locations.  8/19 at [place] and 8/27 confirmed .

## 2024-08-19 ENCOUNTER — HOSPITAL ENCOUNTER (OUTPATIENT)
Dept: INFUSION CENTER | Facility: HOSPITAL | Age: 34
Discharge: HOME | End: 2024-08-19
Attending: INTERNAL MEDICINE
Payer: COMMERCIAL

## 2024-08-19 VITALS
DIASTOLIC BLOOD PRESSURE: 85 MMHG | HEART RATE: 105 BPM | RESPIRATION RATE: 18 BRPM | SYSTOLIC BLOOD PRESSURE: 123 MMHG | TEMPERATURE: 97.8 F

## 2024-08-19 DIAGNOSIS — R11.2 CHEMOTHERAPY INDUCED NAUSEA AND VOMITING: ICD-10-CM

## 2024-08-19 DIAGNOSIS — T45.1X5A CHEMOTHERAPY INDUCED NAUSEA AND VOMITING: ICD-10-CM

## 2024-08-19 DIAGNOSIS — C62.11 SEMINOMA OF DESCENDED RIGHT TESTIS (HCC): ICD-10-CM

## 2024-08-19 DIAGNOSIS — N18.31 STAGE 3A CHRONIC KIDNEY DISEASE (HCC): Primary | ICD-10-CM

## 2024-08-19 PROCEDURE — 96360 HYDRATION IV INFUSION INIT: CPT

## 2024-08-19 RX ADMIN — SODIUM CHLORIDE 1000 ML: 0.9 INJECTION, SOLUTION INTRAVENOUS at 12:17

## 2024-08-21 NOTE — PROGRESS NOTES
Hematology/Oncology Outpatient Office Note    Date of Service: 2024    [facility]  [address] [place] [id number]  [phone]    Reason for Consultation:   Chief Complaint   Patient presents with    Follow-up       Cancer Stage at diagnosis: IIIA    Referral Physician: No ref. provider found    Primary Care Physician:  No primary care provider on file.     Nickname: Ron    Spouse: Bernie    Has a young infant ( 2024)    Original ECO     Today's ECO    Goals and Barriers:  Current Goal: Minimize effects of disease burden, extend life.   Barriers to accomplishing this: None    Patient's Capacity to Self Care:  Patient is able to self care    ASSESSMENT & PLAN      Diagnosis ICD-10-CM Associated Orders   1. Seminoma of descended right testis (HCC)  C62.11       2. Chemotherapy induced nausea and vomiting  R11.2     T45.1X5A             This is a 34 y.o. 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 I reviewed relevant office notes, imaging reports and pathology result as well.  I told the patient that this is a case of curable disease and what this means. We discussed that the goal of anti-cancer therapy is to provide best quality of life, extend overall survival, and progression free survival as shown in clinical trials.   I explained the risks/benefits of the proposed cancer therapy: Cisplatin + Etoposide and after discussion including understanding risks of possible  life-threatening complications and therapy-related malignancy development, informed consent for blood products and treatment has been signed and obtained.  TNM/Staging At Diagnosis  Cancer Staging   Seminoma of descended right testis (HCC)  Staging form: Testis, AJCC 8th Edition  - Pathologic stage from 5/24/2024: Stage Unknown (pT1b, cN2c, cM1a, S0) - Signed by Shreyas Amezcua MD on 6/23/2024  Stage prefix: Initial diagnosis  Residual tumor (R): R0 - None  [id number] (underlying LN)  Disease Features/Tumor Markers/Genetics  Tumor Marker: AFP, LDH, HCT WNL  6/25/2024: HCG, AFP, LDH WNL  Notable Path Features:   5/24/2024: Right testicle and spermatic cord (radical inguinal orchiectomy):     - Seminoma (4.5 cm).      - No definitive lymph-vascular invasion.     - Tunica vaginalis negative for tumor.     - Surgical margin negative for tumor  Treatment: Cisplatin + Etoposide   Other Supportive care: Zofran, EMLA  Treatment Team Members  Surgeon: Dr. Hamm  Rad Onc  Palliative  Labs  Diagnostics  6/10/2024 CT CAP w/c: Multiple pulmonary nodules. No prior study for comparison. Based on current Fleischner Society 2017 Guidelines on incidental pulmonary nodule, patients with a known malignancy are at increased risk of metastasis and should receive initial three-month   follow-up chest CT. Mediastinal and bilateral hilar lymphadenopathy concerning for metastatic disease given the patient's history.  Bilateral hilar and mediastinal lymphadenopathy. Right hilar lymph node measures 2.9 x 2.1 cm. Subcarinal lymph node is 2.5 x 1.4 cm. Pretracheal lymph node is 2.2 x 1.1 cm. Left hilar lymph node is 1.3 x 1.1 cm.     Discussion of decision making    I personally reviewed the following lab results, the image studies, pathology, other specialty/physicians consult notes and recommendations, and outside medical records. I had a lengthy discussion with the patient and shared the work-up findings. We discussed the diagnosis and  management plan as below. I spent 42 minutes reviewing the records (labs, clinician notes, outside records, medical history, ordering medicine/tests/procedures, monitoring of anti-neoplastic toxicities, interpreting the imaging/labs previously done) and coordination of care as well as direct time with the patient today, of which greater than 50% of the time was spent in counseling and coordination of care with the patient/family.      Plan/Labs  Treatment would consist of Cisplatin 20 mg/m2 D1-5 + Etoposide 100 mg/m2 D1-5 for 4 cycles (good risk Stage III pure seminoma), consent being obtained today  C3 coming up with preceding labs  F/u Dietitian        Follow Up: q3 weeks scheduled thru 9/23/2024    All questions were answered to the patient's satisfaction during this encounter. The patient knows the contact information for our office and knows to reach out for any relevant concerns related to this encounter. They are to call for any temperature 100.4 or higher, new symptoms including but not restricted to shaking chills, decreased appetite, nausea, vomiting, diarrhea, increased fatigue, shortness of breath or chest pain, confusion, and not feeling the strength to come to the clinic. For all other listed problems and medical diagnosis in their chart - they are managed by PCP and/or other specialists, which the patient acknowledges. Thank you very much for your consultation and making us a part of this patient's care. We are continuing to follow closely with you. Please do not hesitate to reach out to me with any additional questions or concerns.    Shreyas Amezcua MD  Hematology & Medical Oncology Staff Physician             Disclaimer: This document was prepared using [employer] technology. If a word or phrase is confusing, or does not make sense, this is likely due to recognition error which was not discovered during this clinician's review. If you believe an error has occurred, please contact me  through [facility] service line for kirill?cation.      ONCOLOGY HISTORY OF PRESENT ILLNESS        Oncology History   Seminoma of descended right testis (HCC)   5/24/2024 -  Cancer Staged    Staging form: Testis, AJCC 8th Edition  - Pathologic stage from 5/24/2024: Stage IIIA (pT1b, cM1a, S0) - Signed by Shreyas Amezcua MD on 6/26/2024  Stage prefix: Initial diagnosis  Residual tumor (R): R0 - None       6/11/2024 Initial Diagnosis    Seminoma of descended right testis (HCC)     7/15/2024 -  Chemotherapy    alteplase (CATHFLO), 2 mg, Intracatheter, Every 1 Minute as needed, 2 of 4 cycles  CISplatin (PLATINOL) infusion, 20 mg/m2 = 38.2 mg, Intravenous, Once, 2 of 4 cycles  Administration: 38.2 mg (7/15/2024), 38.2 mg (7/16/2024), 38.2 mg (7/17/2024), 38.2 mg (7/18/2024), 38.2 mg (7/19/2024), 38.2 mg (8/12/2024), 38.2 mg (8/13/2024), 38.2 mg (8/14/2024), 38.2 mg (8/15/2024), 38.2 mg (8/16/2024)  fosaprepitant (EMEND) IVPB, 150 mg, Intravenous, Once, 2 of 4 cycles  Administration: 150 mg (7/15/2024), 150 mg (8/12/2024)  etoposide (TOPOSAR), 100 mg/m2 = 191 mg, Intravenous, Once, 2 of 4 cycles  Administration: 191 mg (7/15/2024), 191 mg (7/16/2024), 191 mg (7/17/2024), 191 mg (7/18/2024), 191 mg (7/19/2024), 191 mg (8/12/2024), 191 mg (8/13/2024), 191 mg (8/14/2024), 191 mg (8/15/2024), 191 mg (8/16/2024)           SUBJECTIVE  (INTERVAL HISTORY)      Clotting History None   Bleeding History None   Cancer History R Testicular   Family Cancer History None   H/O Blood/Plt Transfusion None   Tobacco/etoh/drug abuse 1 PPD x 3 years, quit at age 26, no etoh abuse, does recreational marijuana           Occupation  at a [employer]     Pain: none  Tolerating treatment well.  Reports that hydration after chemotherapy helps with nausea and overall performance.  They have gone through the nutrition consult and they are following the instructions for better nutrition.  He reports that he is looking forward to  completing all 4 cycles.  No issues or concerns from his part.    I have reviewed the relevant past medical, surgical, social and family history. I have also reviewed allergies and medications for this patient.    Review of Systems  Baseline weight: 165-175 lbs    Denies F/C, N/V, SOB, CP, HA, rash, itching, gen weakness, melena, hematuria, hematochezia, falls, diarrhea, or constipation       A 10-point review of system was performed, pertinent positive and negative were detailed as above. Otherwise, the 10-point review of system was negative.      Past Medical History:   Diagnosis Date    Hypertension     resolved    Meniere's disease     left sided    PONV (postoperative nausea and vomiting)     Single kidney     left    Vertigo        Past Surgical History:   Procedure Laterality Date    HERNIA REPAIR      IR PORT PLACEMENT  2024    NASAL SINUS SURGERY Left 2020    NEPHRECTOMY Right     GA ORCHIECTOMY RADICAL TUMOR INGUINAL APPROACH Right 2024    Procedure: ORCHIECTOMY INGUINAL;  Surgeon: Nathan Hamm MD;  Location: [facility] OR;  Service: Urology       Family History   Problem Relation Age of Onset    Hypertension Mother     Heart disease Father         cardiac pacemaker    Hypertension Father        Social History     Socioeconomic History    Marital status: /Civil Union     Spouse name: Not on file    Number of children: Not on file    Years of education: Not on file    Highest education level: Not on file   Occupational History    Not on file   Tobacco Use    Smoking status: Former     Current packs/day: 0.00     Types: Cigarettes     Quit date: 2018     Years since quittin.9     Passive exposure: Past    Smokeless tobacco: Never   Vaping Use    Vaping status: Never Used   Substance and Sexual Activity    Alcohol use: Not Currently    Drug use: Yes     Frequency: 7.0 times per week     Types: Marijuana     Comment: vidhi 24    Sexual activity: Not on file   Other Topics  Concern    Not on file   Social History Narrative    Denied: Always uses seat belt    Caffeine use: one 16 oz cups of coffee, 1 cup of soda    Does not exercise     Social Determinants of Health     Financial Resource Strain: Not on file   Food Insecurity: Not on file   Transportation Needs: Not on file   Physical Activity: Not on file   Stress: Not on file   Social Connections: Not on file   Intimate Partner Violence: Not on file   Housing Stability: Not on file       No Known Allergies    Current Outpatient Medications   Medication Sig Dispense Refill    Apple Cider Vinegar 188 MG CAPS Take by mouth      Cholecalciferol (Vitamin D3) 125 MCG (5000 UT) TABS Take 5,000 Units by mouth daily      lidocaine-prilocaine (EMLA) cream Apply topically as needed for mild pain 50 g 1    magnesium 30 MG tablet Take 30 mg by mouth 2 (two) times a day      Omega-3 Fatty Acids (Fish Oil) 300 MG CAPS Take by mouth      ondansetron (ZOFRAN-ODT) 8 mg disintegrating tablet Take 1 tablet (8 mg total) by mouth every 8 (eight) hours as needed for nausea or vomiting 20 tablet 1    prochlorperazine (COMPAZINE) 10 mg tablet Take 1 tablet (10 mg total) by mouth every 6 (six) hours as needed for nausea or vomiting 30 tablet 1    Pyridoxine HCl (VITAMIN B6 PO) Take by mouth      Turmeric (QC TUMERIC COMPLEX PO) Take by mouth      esomeprazole (NexIUM) 20 mg capsule Take 20 mg by mouth every morning before breakfast (Patient not taking: Reported on 5/16/2024)      meclizine (ANTIVERT) 25 mg tablet Take 1 tablet (25 mg total) by mouth every 8 (eight) hours as needed for dizziness (Patient not taking: Reported on 5/16/2024) 30 tablet 0    meloxicam (Mobic) 15 mg tablet Take 1 tablet (15 mg total) by mouth daily (Patient not taking: Reported on 5/16/2024) 30 tablet 1    Mupirocin POWD Use Add content of one cap to 240 ml saline. Irrigate each nostril with 120 ml of medicated saline BID (Patient not taking: Reported on 5/16/2024)      NON FORMULARY  Mometasone nasal irrigiation (Patient not taking: Reported on 6/11/2024)      NON FORMULARY 2 (two) times a day nurpirocin nasal irrigation (Patient not taking: Reported on 6/11/2024)      NON FORMULARY daily Vitamin B5 25mg (Patient not taking: Reported on 6/11/2024)      triamterene-hydrochlorothiazide (DYAZIDE) 37.5-25 mg per capsule Take 1 capsule by mouth every morning (Patient not taking: Reported on 5/16/2024) 90 capsule 0     No current facility-administered medications for this visit.       (Not in a hospital admission)      Objective:     24 Hour Vitals Assessment:     Vitals:    08/27/24 0922   BP: 114/60   Pulse: 70   Resp: 16   Temp: (!) 97.4 °F (36.3 °C)   SpO2: 98%         PHYSICIAN EXAM:    General: Appearance: alert, cooperative, no distress.  HEENT: Normocephalic, atraumatic. No scleral icterus. conjunctivae clear. EOMI.  Chest: No tenderness to palpation. No open wound noted.  Lungs: Clear to auscultation bilaterally, Respirations unlabored.  Cardiac: Regular rate and rhythm, +S1and S2, -r/m/g  Abdomen: Soft, non-tender, non-distended. Bowel sounds are normal.  Extremities:  No edema, cyanosis, clubbing.  Skin: Skin color, turgor are normal. No rashes.  Lymphatics: no palpable supra-cervical, axillary, or inguinal adenopathy  Neurologic: Awake, Alert, and oriented, no gross focal deficits noted b/l.       DATA REVIEW:    Pathology Result:    Final Diagnosis   Date Value Ref Range Status   05/24/2024   Final    A.  Right testicle and spermatic cord (radical inguinal orchiectomy):     - Seminoma (4.5 cm).      - No definitive lymph-vascular invasion.     - Tunica vaginalis negative for tumor.     - Surgical margin negative for tumor.          Image Results:   Image result are reviewed and documented in Hematology/Oncology history    IR port placement  Narrative: Examination: Venous port placement.  Venous access with ultrasound guidance, tunneled port insertion under fluoroscopic  guidance.    HISTORY: Infusion of chemotherapy  Patient with history of testicular cancer    PROCEDURE: Informed consent was obtained.  The right chest and neck was prepped and draped in usual sterile fashion.  Timeout was performed.  Lidocaine was given a as local anesthesia.    Ultrasound guidance was used to cannulate the right internal jugular    Using fluoroscopic guidance, a wire was advanced centrally.    Lidocaine was then given over the chest wall.  An incision was made in the chest/upper arm, and a pocket was created using a combination of sharp, and blunt dissection.  The port was inserted into the pocket.  The catheter was tunneled subcutaneously to   the venotomy site, and trimmed to appropriate length.  The catheter was advanced via a peel-away sheath, into the vein, under fluoroscopic guidance.    The incision was closed in layers.  Glue was applied to the surface of the skin, and the venotomy site.    FINDINGS: Ultrasound shows a widely patent vein.  It was compressible and free of thrombus.  Fluoroscopy shows final catheter position of: Cavoatrial region    Power injection compatible: Yes  Impression: Technically successful placement of venous access port    This is the end of the clinically relevant portion of the report.  Subsequent information is for compliance, documentation, and coding purposes.    In the process of informed consent, information was communicated, verbally, to the patient regarding the procedure.  The patient was informed of; the name of the procedure, nature of the procedure, nature of the condition, risks, benefits, alternatives,   and potential complications, as well as the consequences of not having the procedure.  All the patient's questions were answered.  Informed consent was signed.  Quoted risks included infection, and venous thrombosis.    Automated exposure control was utilized, and there was minimize, in accordance with the application of the ALARA  "technique.    Chlorhexidine and alcohol was used for cleansing and sterile preparation of the skin.  This was allowed to dry prior to the application of sterile draping.    Timeout was performed, with all team members present, and in agreement.  Confirmation of patient, procedure, laterally, allergies, and all needed equipment was performed.    The patient was examined, and is in satisfactory condition for planned moderate sedation.  Mallampati score: 2  Intravenous conscious sedation was provided.  There was continuous monitoring of blood pressure, heart rate, respiratory rate, and oxygen saturation by an independent, trained observer.  Conscious sedation time: 60 minutes  Versed dose: 1 Milligrams  Fentanyl dose: 100 Micrograms  After the procedure, the patient was recovered, and return to their baseline status, and was deemed fit for discharge from .  Fluoroscopy time:  0.5    minutes  Radiation dose: 12.2 MilliGray    PROCEDURE: Port placement  Preprocedure diagnosis: Please see \"history \", above  Postprocedure diagnosis: Same  Antibiotics: Ancef 2 g  Specimens: None  Estimated blood loss: Less than 5 mL    Anesthesia: Local and monitored intravenous conscious sedation    Maximal sterile barrier technique, according to current guidelines, were utilized.  These include, but are not limited to: Hat, mask, and shoe covers for all staff.  Sterile gown and gloves for all operators.  A sterile cover was used for the ultrasound   probe.  The patient was covered, from head to toe, in sterile drapes.    Ultrasound was used to evaluate the above-named access vein as a potential access site.  It was compressible and free of thrombus.  Static and real-time images of needle entry were obtained, and archived.    Workstation performed: [id number]      LABS:  Lab data are reviewed and documented in [facility] history.       Lab Results   Component Value Date    HGB 13.4 08/09/2024    HCT 38.2 08/09/2024    MCV 90 08/09/2024    " " 08/09/2024    WBC 5.18 08/09/2024    NRBC 0 08/09/2024     Lab Results   Component Value Date     12/16/2017    K 4.2 08/09/2024     08/09/2024    CO2 31 08/09/2024    BUN 9 08/09/2024    CREATININE 1.33 (H) 08/09/2024    GLUF 146 (H) 08/09/2024    CALCIUM 9.4 08/09/2024    AST 43 (H) 08/09/2024    ALT 66 (H) 08/09/2024    ALKPHOS 51 08/09/2024    PROT 7.3 12/16/2017    BILITOT 1.0 12/16/2017    EGFR 68 08/09/2024       No results found for: \"IRON\", \"TIBC\", \"FERRITIN\"    Lab Results   Component Value Date    ISKMWIEX33 400 02/14/2022       No results for input(s): \"WBC\", \"CREAT\", \"PLT\" in the last 72 hours.      By:  Pavel Preciado MD, 8/27/2024, 9:48 AM                                  "

## 2024-08-27 ENCOUNTER — OFFICE VISIT (OUTPATIENT)
Dept: HEMATOLOGY ONCOLOGY | Facility: CLINIC | Age: 34
End: 2024-08-27
Payer: COMMERCIAL

## 2024-08-27 VITALS
RESPIRATION RATE: 16 BRPM | WEIGHT: 159 LBS | OXYGEN SATURATION: 98 % | HEART RATE: 70 BPM | SYSTOLIC BLOOD PRESSURE: 114 MMHG | HEIGHT: 67 IN | TEMPERATURE: 97.4 F | DIASTOLIC BLOOD PRESSURE: 60 MMHG | BODY MASS INDEX: 24.96 KG/M2

## 2024-08-27 DIAGNOSIS — T45.1X5A CHEMOTHERAPY INDUCED NAUSEA AND VOMITING: ICD-10-CM

## 2024-08-27 DIAGNOSIS — R11.2 CHEMOTHERAPY INDUCED NAUSEA AND VOMITING: ICD-10-CM

## 2024-08-27 DIAGNOSIS — C62.11 SEMINOMA OF DESCENDED RIGHT TESTIS (HCC): Primary | ICD-10-CM

## 2024-08-27 PROCEDURE — 99215 OFFICE O/P EST HI 40 MIN: CPT | Performed by: INTERNAL MEDICINE

## 2024-08-29 DIAGNOSIS — T45.1X5A CHEMOTHERAPY INDUCED NAUSEA AND VOMITING: ICD-10-CM

## 2024-08-29 DIAGNOSIS — C62.11 SEMINOMA OF DESCENDED RIGHT TESTIS (HCC): ICD-10-CM

## 2024-08-29 DIAGNOSIS — R11.2 CHEMOTHERAPY INDUCED NAUSEA AND VOMITING: ICD-10-CM

## 2024-08-29 RX ORDER — PROCHLORPERAZINE MALEATE 10 MG
10 TABLET ORAL EVERY 6 HOURS PRN
Qty: 30 TABLET | Refills: 1 | Status: SHIPPED | OUTPATIENT
Start: 2024-08-29

## 2024-08-29 RX ORDER — ONDANSETRON 8 MG/1
8 TABLET, ORALLY DISINTEGRATING ORAL EVERY 8 HOURS PRN
Qty: 20 TABLET | Refills: 1 | Status: SHIPPED | OUTPATIENT
Start: 2024-08-29

## 2024-08-29 NOTE — TELEPHONE ENCOUNTER
#1 Medication: Ondansetron    Dose/Frequency: 8 mg every 8 hours as needed    Quantity: 20 tablets    Pharmacy: [facility] [address]     Office:   [] PCP/Provider -   [x] Speciality/Provider - Shreyas Amezcua MD    Does the patient have enough for 3 days?   [] Yes   [x] No - Send as HP to POD      #2 Medication: Prochlorperazine    Dose/Frequency: 10 mg every 6 hours as needed    Quantity: 30 tablets    Pharmacy: [facility] [address]     Office:   [] PCP/Provider -   [x] Speciality/Provider - Shreyas Amezcua MD    Does the patient have enough for 3 days?   [x] Yes   [] No - Send as HP to POD

## 2024-08-30 ENCOUNTER — PATIENT OUTREACH (OUTPATIENT)
Dept: CASE MANAGEMENT | Facility: HOSPITAL | Age: 34
End: 2024-08-30

## 2024-09-02 RX ORDER — SODIUM CHLORIDE 9 MG/ML
20 INJECTION, SOLUTION INTRAVENOUS ONCE
Status: CANCELLED | OUTPATIENT
Start: 2024-09-09

## 2024-09-02 RX ORDER — SODIUM CHLORIDE 9 MG/ML
20 INJECTION, SOLUTION INTRAVENOUS ONCE
Status: CANCELLED | OUTPATIENT
Start: 2024-09-11

## 2024-09-02 RX ORDER — SODIUM CHLORIDE 9 MG/ML
20 INJECTION, SOLUTION INTRAVENOUS ONCE
Status: CANCELLED | OUTPATIENT
Start: 2024-09-12

## 2024-09-02 RX ORDER — SODIUM CHLORIDE 9 MG/ML
20 INJECTION, SOLUTION INTRAVENOUS ONCE
Status: CANCELLED | OUTPATIENT
Start: 2024-09-10

## 2024-09-02 RX ORDER — SODIUM CHLORIDE 9 MG/ML
20 INJECTION, SOLUTION INTRAVENOUS ONCE
Status: CANCELLED | OUTPATIENT
Start: 2024-09-13

## 2024-09-05 ENCOUNTER — PATIENT OUTREACH (OUTPATIENT)
Dept: CASE MANAGEMENT | Facility: HOSPITAL | Age: 34
End: 2024-09-05

## 2024-09-05 NOTE — PROGRESS NOTES
[facility] attempted call to patient. If no response, [facility] will close. Patient does have [facility] contact information should a need arise.

## 2024-09-06 ENCOUNTER — APPOINTMENT (OUTPATIENT)
Age: 34
End: 2024-09-06
Payer: COMMERCIAL

## 2024-09-06 DIAGNOSIS — C62.11 SEMINOMA OF DESCENDED RIGHT TESTIS (HCC): ICD-10-CM

## 2024-09-06 LAB
ALBUMIN SERPL BCG-MCNC: 3.7 G/DL (ref 3.5–5)
ALP SERPL-CCNC: 49 U/L (ref 34–104)
ALT SERPL W P-5'-P-CCNC: 29 U/L (ref 7–52)
ANION GAP SERPL CALCULATED.3IONS-SCNC: 7 MMOL/L (ref 4–13)
AST SERPL W P-5'-P-CCNC: 25 U/L (ref 13–39)
BASOPHILS # BLD AUTO: 0.01 THOUSANDS/ÂΜL (ref 0–0.1)
BASOPHILS NFR BLD AUTO: 0 % (ref 0–1)
BILIRUB SERPL-MCNC: 0.28 MG/DL (ref 0.2–1)
BUN SERPL-MCNC: 10 MG/DL (ref 5–25)
CALCIUM SERPL-MCNC: 8.9 MG/DL (ref 8.4–10.2)
CHLORIDE SERPL-SCNC: 102 MMOL/L (ref 96–108)
CO2 SERPL-SCNC: 29 MMOL/L (ref 21–32)
CREAT SERPL-MCNC: 1.26 MG/DL (ref 0.6–1.3)
EOSINOPHIL # BLD AUTO: 0.02 THOUSAND/ÂΜL (ref 0–0.61)
EOSINOPHIL NFR BLD AUTO: 0 % (ref 0–6)
ERYTHROCYTE [DISTWIDTH] IN BLOOD BY AUTOMATED COUNT: 14.8 % (ref 11.6–15.1)
GFR SERPL CREATININE-BSD FRML MDRD: 73 ML/MIN/1.73SQ M
GLUCOSE SERPL-MCNC: 103 MG/DL (ref 65–140)
HCT VFR BLD AUTO: 33 % (ref 36.5–49.3)
HGB BLD-MCNC: 11.4 G/DL (ref 12–17)
IMM GRANULOCYTES # BLD AUTO: 0.06 THOUSAND/UL (ref 0–0.2)
IMM GRANULOCYTES NFR BLD AUTO: 1 % (ref 0–2)
LYMPHOCYTES # BLD AUTO: 1.5 THOUSANDS/ÂΜL (ref 0.6–4.47)
LYMPHOCYTES NFR BLD AUTO: 31 % (ref 14–44)
MAGNESIUM SERPL-MCNC: 1.8 MG/DL (ref 1.9–2.7)
MCH RBC QN AUTO: 31.4 PG (ref 26.8–34.3)
MCHC RBC AUTO-ENTMCNC: 34.5 G/DL (ref 31.4–37.4)
MCV RBC AUTO: 91 FL (ref 82–98)
MONOCYTES # BLD AUTO: 0.71 THOUSAND/ÂΜL (ref 0.17–1.22)
MONOCYTES NFR BLD AUTO: 15 % (ref 4–12)
NEUTROPHILS # BLD AUTO: 2.52 THOUSANDS/ÂΜL (ref 1.85–7.62)
NEUTS SEG NFR BLD AUTO: 53 % (ref 43–75)
NRBC BLD AUTO-RTO: 0 /100 WBCS
PLATELET # BLD AUTO: 324 THOUSANDS/UL (ref 149–390)
PMV BLD AUTO: 10.3 FL (ref 8.9–12.7)
POTASSIUM SERPL-SCNC: 3.7 MMOL/L (ref 3.5–5.3)
PROT SERPL-MCNC: 6.1 G/DL (ref 6.4–8.4)
RBC # BLD AUTO: 3.63 MILLION/UL (ref 3.88–5.62)
SODIUM SERPL-SCNC: 138 MMOL/L (ref 135–147)
WBC # BLD AUTO: 4.82 THOUSAND/UL (ref 4.31–10.16)

## 2024-09-06 PROCEDURE — 85025 COMPLETE CBC W/AUTO DIFF WBC: CPT

## 2024-09-06 PROCEDURE — 80053 COMPREHEN METABOLIC PANEL: CPT

## 2024-09-06 PROCEDURE — 83735 ASSAY OF MAGNESIUM: CPT

## 2024-09-06 PROCEDURE — 36415 COLL VENOUS BLD VENIPUNCTURE: CPT

## 2024-09-09 ENCOUNTER — HOSPITAL ENCOUNTER (OUTPATIENT)
Dept: INFUSION CENTER | Facility: CLINIC | Age: 34
Discharge: HOME | End: 2024-09-09
Payer: COMMERCIAL

## 2024-09-09 VITALS — HEIGHT: 67 IN | TEMPERATURE: 98.7 F | BODY MASS INDEX: 24.91 KG/M2 | WEIGHT: 158.73 LBS

## 2024-09-09 DIAGNOSIS — E83.42 HYPOMAGNESEMIA: Primary | ICD-10-CM

## 2024-09-09 DIAGNOSIS — C62.11 SEMINOMA OF DESCENDED RIGHT TESTIS (HCC): ICD-10-CM

## 2024-09-09 PROCEDURE — 96417 CHEMO IV INFUS EACH ADDL SEQ: CPT

## 2024-09-09 PROCEDURE — 96413 CHEMO IV INFUSION 1 HR: CPT

## 2024-09-09 PROCEDURE — 36593 DECLOT VASCULAR DEVICE: CPT

## 2024-09-09 PROCEDURE — 96367 TX/PROPH/DG ADDL SEQ IV INF: CPT

## 2024-09-09 PROCEDURE — 96366 THER/PROPH/DIAG IV INF ADDON: CPT

## 2024-09-09 PROCEDURE — 96368 THER/DIAG CONCURRENT INF: CPT

## 2024-09-09 RX ORDER — SODIUM CHLORIDE 9 MG/ML
20 INJECTION, SOLUTION INTRAVENOUS ONCE
Status: COMPLETED | OUTPATIENT
Start: 2024-09-09 | End: 2024-09-09

## 2024-09-09 RX ADMIN — FOSAPREPITANT 150 MG: 150 INJECTION, POWDER, LYOPHILIZED, FOR SOLUTION INTRAVENOUS at 09:41

## 2024-09-09 RX ADMIN — ETOPOSIDE 191 MG: 20 INJECTION, SOLUTION INTRAVENOUS at 11:26

## 2024-09-09 RX ADMIN — ALTEPLASE 2 MG: 2.2 INJECTION, POWDER, LYOPHILIZED, FOR SOLUTION INTRAVENOUS at 08:31

## 2024-09-09 RX ADMIN — DEXAMETHASONE SODIUM PHOSPHATE: 10 INJECTION, SOLUTION INTRAMUSCULAR; INTRAVENOUS at 09:14

## 2024-09-09 RX ADMIN — MAGNESIUM SULFATE HEPTAHYDRATE: 500 INJECTION, SOLUTION INTRAMUSCULAR; INTRAVENOUS at 12:29

## 2024-09-09 RX ADMIN — CISPLATIN 38.2 MG: 1 INJECTION, SOLUTION INTRAVENOUS at 10:21

## 2024-09-09 RX ADMIN — SODIUM CHLORIDE 20 ML/HR: 0.9 INJECTION, SOLUTION INTRAVENOUS at 08:47

## 2024-09-09 RX ADMIN — MAGNESIUM SULFATE HEPTAHYDRATE: 500 INJECTION, SOLUTION INTRAMUSCULAR; INTRAVENOUS at 08:47

## 2024-09-09 NOTE — PROGRESS NOTES
Reviewed magnesium level results at 1.8.   Magnesium 1 gram in pre and post hydration, reviewed with Yolette GONZALES RN at [facility].

## 2024-09-09 NOTE — PROGRESS NOTES
Cath neptali effective to open port. Pt tolerated treatment without incident, left unit in stable condition without question or concern. Pt aware of appt tomorrow at 0800 for day two treatment.

## 2024-09-09 NOTE — PROGRESS NOTES
Pt arrived to unit without complaint, serum Mg=1.8, MD notified via Audrey BRYANT RN, Mg 1gm added to pre and post hydration. Pt's port without blood return, cath neptali instilled per protocol, prehydration infusing via periph IV presently

## 2024-09-10 ENCOUNTER — HOSPITAL ENCOUNTER (OUTPATIENT)
Dept: INFUSION CENTER | Facility: CLINIC | Age: 34
Discharge: HOME | End: 2024-09-10
Payer: COMMERCIAL

## 2024-09-10 ENCOUNTER — PATIENT OUTREACH (OUTPATIENT)
Dept: CASE MANAGEMENT | Facility: HOSPITAL | Age: 34
End: 2024-09-10

## 2024-09-10 VITALS
TEMPERATURE: 98.6 F | HEART RATE: 91 BPM | RESPIRATION RATE: 18 BRPM | BODY MASS INDEX: 25.33 KG/M2 | WEIGHT: 161.38 LBS | DIASTOLIC BLOOD PRESSURE: 82 MMHG | HEIGHT: 67 IN | SYSTOLIC BLOOD PRESSURE: 141 MMHG

## 2024-09-10 DIAGNOSIS — C62.11 SEMINOMA OF DESCENDED RIGHT TESTIS (HCC): ICD-10-CM

## 2024-09-10 DIAGNOSIS — E83.42 HYPOMAGNESEMIA: Primary | ICD-10-CM

## 2024-09-10 PROCEDURE — 96413 CHEMO IV INFUSION 1 HR: CPT

## 2024-09-10 PROCEDURE — 96361 HYDRATE IV INFUSION ADD-ON: CPT

## 2024-09-10 PROCEDURE — 96367 TX/PROPH/DG ADDL SEQ IV INF: CPT

## 2024-09-10 PROCEDURE — 96417 CHEMO IV INFUS EACH ADDL SEQ: CPT

## 2024-09-10 RX ORDER — SODIUM CHLORIDE 9 MG/ML
20 INJECTION, SOLUTION INTRAVENOUS ONCE
Status: COMPLETED | OUTPATIENT
Start: 2024-09-10 | End: 2024-09-10

## 2024-09-10 RX ADMIN — SODIUM CHLORIDE 500 ML: 0.9 INJECTION, SOLUTION INTRAVENOUS at 08:24

## 2024-09-10 RX ADMIN — SODIUM CHLORIDE 20 ML/HR: 0.9 INJECTION, SOLUTION INTRAVENOUS at 08:25

## 2024-09-10 RX ADMIN — ETOPOSIDE 191 MG: 20 INJECTION, SOLUTION INTRAVENOUS at 10:34

## 2024-09-10 RX ADMIN — SODIUM CHLORIDE 500 ML: 0.9 INJECTION, SOLUTION INTRAVENOUS at 11:34

## 2024-09-10 RX ADMIN — CISPLATIN 38.2 MG: 1 INJECTION, SOLUTION INTRAVENOUS at 09:32

## 2024-09-10 RX ADMIN — DEXAMETHASONE SODIUM PHOSPHATE: 10 INJECTION, SOLUTION INTRAMUSCULAR; INTRAVENOUS at 08:34

## 2024-09-10 NOTE — PROGRESS NOTES
Ron Franco  tolerated treatment well with no complications.      Ron Franco is aware of future appt on 9/11 at 8am.     AVS printed and given to Ron Franco:    No (Declined by Ron Franco)

## 2024-09-10 NOTE — PROGRESS NOTES
[facility] has not heard back from patient but he does have [facility] contact information should he need assistance. [facility] will close.

## 2024-09-11 ENCOUNTER — HOSPITAL ENCOUNTER (OUTPATIENT)
Dept: INFUSION CENTER | Facility: CLINIC | Age: 34
Discharge: HOME | End: 2024-09-11
Payer: COMMERCIAL

## 2024-09-11 VITALS
WEIGHT: 160.94 LBS | BODY MASS INDEX: 25.26 KG/M2 | SYSTOLIC BLOOD PRESSURE: 117 MMHG | HEIGHT: 67 IN | HEART RATE: 76 BPM | RESPIRATION RATE: 18 BRPM | TEMPERATURE: 98.8 F | DIASTOLIC BLOOD PRESSURE: 77 MMHG

## 2024-09-11 DIAGNOSIS — C62.11 SEMINOMA OF DESCENDED RIGHT TESTIS (HCC): ICD-10-CM

## 2024-09-11 DIAGNOSIS — E83.42 HYPOMAGNESEMIA: Primary | ICD-10-CM

## 2024-09-11 PROCEDURE — 96413 CHEMO IV INFUSION 1 HR: CPT

## 2024-09-11 PROCEDURE — 96361 HYDRATE IV INFUSION ADD-ON: CPT

## 2024-09-11 PROCEDURE — 96417 CHEMO IV INFUS EACH ADDL SEQ: CPT

## 2024-09-11 PROCEDURE — 96367 TX/PROPH/DG ADDL SEQ IV INF: CPT

## 2024-09-11 RX ORDER — SODIUM CHLORIDE 9 MG/ML
20 INJECTION, SOLUTION INTRAVENOUS ONCE
Status: COMPLETED | OUTPATIENT
Start: 2024-09-11 | End: 2024-09-11

## 2024-09-11 RX ADMIN — DEXAMETHASONE SODIUM PHOSPHATE: 10 INJECTION, SOLUTION INTRAMUSCULAR; INTRAVENOUS at 08:23

## 2024-09-11 RX ADMIN — SODIUM CHLORIDE 20 ML/HR: 0.9 INJECTION, SOLUTION INTRAVENOUS at 08:22

## 2024-09-11 RX ADMIN — SODIUM CHLORIDE 500 ML: 0.9 INJECTION, SOLUTION INTRAVENOUS at 11:30

## 2024-09-11 RX ADMIN — ETOPOSIDE 191 MG: 20 INJECTION, SOLUTION INTRAVENOUS at 10:28

## 2024-09-11 RX ADMIN — SODIUM CHLORIDE 500 ML: 0.9 INJECTION, SOLUTION INTRAVENOUS at 08:22

## 2024-09-11 RX ADMIN — CISPLATIN 38.2 MG: 1 INJECTION, SOLUTION INTRAVENOUS at 09:25

## 2024-09-11 NOTE — PROGRESS NOTES
Ron Franco  tolerated treatment well with no complications.       Ron Franco is aware of future appt on 9/12 at 8am.      AVS printed and given to Ron Franco:    No (Declined by Ron Franco)

## 2024-09-12 ENCOUNTER — HOSPITAL ENCOUNTER (OUTPATIENT)
Dept: INFUSION CENTER | Facility: CLINIC | Age: 34
Discharge: HOME | End: 2024-09-12
Payer: COMMERCIAL

## 2024-09-12 VITALS
SYSTOLIC BLOOD PRESSURE: 128 MMHG | TEMPERATURE: 98.5 F | WEIGHT: 161.82 LBS | HEIGHT: 67 IN | HEART RATE: 78 BPM | RESPIRATION RATE: 18 BRPM | BODY MASS INDEX: 25.4 KG/M2 | DIASTOLIC BLOOD PRESSURE: 84 MMHG

## 2024-09-12 DIAGNOSIS — E83.42 HYPOMAGNESEMIA: Primary | ICD-10-CM

## 2024-09-12 DIAGNOSIS — C62.11 SEMINOMA OF DESCENDED RIGHT TESTIS (HCC): ICD-10-CM

## 2024-09-12 PROCEDURE — 96367 TX/PROPH/DG ADDL SEQ IV INF: CPT

## 2024-09-12 PROCEDURE — 96361 HYDRATE IV INFUSION ADD-ON: CPT

## 2024-09-12 PROCEDURE — 96413 CHEMO IV INFUSION 1 HR: CPT

## 2024-09-12 PROCEDURE — 96417 CHEMO IV INFUS EACH ADDL SEQ: CPT

## 2024-09-12 RX ORDER — SODIUM CHLORIDE 9 MG/ML
20 INJECTION, SOLUTION INTRAVENOUS ONCE
Status: COMPLETED | OUTPATIENT
Start: 2024-09-12 | End: 2024-09-12

## 2024-09-12 RX ADMIN — SODIUM CHLORIDE 500 ML: 0.9 INJECTION, SOLUTION INTRAVENOUS at 11:37

## 2024-09-12 RX ADMIN — DEXAMETHASONE SODIUM PHOSPHATE: 10 INJECTION, SOLUTION INTRAMUSCULAR; INTRAVENOUS at 08:27

## 2024-09-12 RX ADMIN — CISPLATIN 38.2 MG: 1 INJECTION, SOLUTION INTRAVENOUS at 09:26

## 2024-09-12 RX ADMIN — SODIUM CHLORIDE 20 ML/HR: 0.9 INJECTION, SOLUTION INTRAVENOUS at 08:26

## 2024-09-12 RX ADMIN — SODIUM CHLORIDE 500 ML: 0.9 INJECTION, SOLUTION INTRAVENOUS at 08:25

## 2024-09-12 RX ADMIN — ETOPOSIDE 191 MG: 20 INJECTION, SOLUTION INTRAVENOUS at 10:34

## 2024-09-13 ENCOUNTER — HOSPITAL ENCOUNTER (OUTPATIENT)
Dept: INFUSION CENTER | Facility: CLINIC | Age: 34
End: 2024-09-13
Payer: COMMERCIAL

## 2024-09-13 VITALS
HEIGHT: 67 IN | BODY MASS INDEX: 24.91 KG/M2 | HEART RATE: 71 BPM | SYSTOLIC BLOOD PRESSURE: 131 MMHG | RESPIRATION RATE: 18 BRPM | TEMPERATURE: 98.4 F | WEIGHT: 158.73 LBS | DIASTOLIC BLOOD PRESSURE: 90 MMHG

## 2024-09-13 DIAGNOSIS — C62.11 SEMINOMA OF DESCENDED RIGHT TESTIS (HCC): ICD-10-CM

## 2024-09-13 DIAGNOSIS — E83.42 HYPOMAGNESEMIA: Primary | ICD-10-CM

## 2024-09-13 PROCEDURE — 96361 HYDRATE IV INFUSION ADD-ON: CPT

## 2024-09-13 PROCEDURE — 96367 TX/PROPH/DG ADDL SEQ IV INF: CPT

## 2024-09-13 PROCEDURE — 96413 CHEMO IV INFUSION 1 HR: CPT

## 2024-09-13 PROCEDURE — 96417 CHEMO IV INFUS EACH ADDL SEQ: CPT

## 2024-09-13 RX ORDER — SODIUM CHLORIDE 9 MG/ML
20 INJECTION, SOLUTION INTRAVENOUS ONCE
Status: COMPLETED | OUTPATIENT
Start: 2024-09-13 | End: 2024-09-13

## 2024-09-13 RX ADMIN — ETOPOSIDE 191 MG: 20 INJECTION, SOLUTION INTRAVENOUS at 11:42

## 2024-09-13 RX ADMIN — DEXAMETHASONE SODIUM PHOSPHATE: 10 INJECTION, SOLUTION INTRAMUSCULAR; INTRAVENOUS at 09:43

## 2024-09-13 RX ADMIN — SODIUM CHLORIDE 500 ML: 0.9 INJECTION, SOLUTION INTRAVENOUS at 09:17

## 2024-09-13 RX ADMIN — SODIUM CHLORIDE 20 ML/HR: 0.9 INJECTION, SOLUTION INTRAVENOUS at 09:16

## 2024-09-13 RX ADMIN — CISPLATIN 38.2 MG: 1 INJECTION, SOLUTION INTRAVENOUS at 10:42

## 2024-09-13 RX ADMIN — SODIUM CHLORIDE 500 ML: 0.9 INJECTION, SOLUTION INTRAVENOUS at 12:58

## 2024-09-13 NOTE — PROGRESS NOTES
Received notification that patient was nauseous this morning and took PRN zofran 8 mg po prior to infusion appointment. Reviewed with BRIAN Lemos at [facility] that will review with Dr Amezcua.    Dr. Amezcua reviewed patient premedication of zofran was for 16 mg, per Dr. Amezcua zofran to be decreased to 8 mg IV.  Reviewed change with Aminta AVILA RN at [facility].   switched omeprazole to IV Pepcid

## 2024-09-13 NOTE — PROGRESS NOTES
Patient arrived to the unit and reported that he has been having nausea and fatigue. He was also slightly dizzy but said that this happens with his meniere's disease. Patient tolerated his treatment well without adverse reaction. He is aware to return on Monday for hydration.

## 2024-09-14 NOTE — PROGRESS NOTES
Hematology/Oncology Outpatient Office Note    Date of Service: 2024    [facility]  [address] [place] [id number]  [phone]    Reason for Consultation:   Chief Complaint   Patient presents with    Follow-up       Cancer Stage at diagnosis: IIIA    Referral Physician: No ref. provider found    Primary Care Physician:  No primary care provider on file.     Nickname: Ron    Spouse: Bernie    Has a young infant ( 2024)    Original ECO     Today's ECO    Goals and Barriers:  Current Goal: Minimize effects of disease burden, extend life.   Barriers to accomplishing this: None    Patient's Capacity to Self Care:  Patient is able to self care    ASSESSMENT & PLAN      Diagnosis ICD-10-CM Associated Orders   1. Seminoma of descended right testis (HCC)  C62.11       2. Chemotherapy induced nausea and vomiting  R11.2 ondansetron (ZOFRAN-ODT) 8 mg disintegrating tablet    T45.1X5A             This is a 34 y.o. 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 I reviewed relevant office notes, imaging reports and pathology result as well.  I told the patient that this is a case of curable disease and what this means. We discussed that the goal of anti-cancer therapy is to provide best quality of life, extend overall survival, and progression free survival as shown in clinical trials.   I explained the risks/benefits of the proposed cancer therapy: Cisplatin + Etoposide and after  discussion including understanding risks of possible life-threatening complications and therapy-related malignancy development, informed consent for blood products and treatment has been signed and obtained.  TNM/Staging At Diagnosis  Cancer Staging   Seminoma of descended right testis (HCC)  Staging form: Testis, AJCC 8th Edition  - Pathologic stage from 5/24/2024: Stage Unknown (pT1b, cN2c, cM1a, S0) - Signed by Shreyas Amezcua MD on 6/23/2024  Stage prefix: Initial diagnosis  Residual tumor (R): R0 - None  [id number] (underlying LN)  Disease Features/Tumor Markers/Genetics  Tumor Marker: AFP, LDH, HCT WNL  6/25/2024: HCG, AFP, LDH WNL  Notable Path Features:   5/24/2024: Right testicle and spermatic cord (radical inguinal orchiectomy):     - Seminoma (4.5 cm).      - No definitive lymph-vascular invasion.     - Tunica vaginalis negative for tumor.     - Surgical margin negative for tumor  Treatment: Cisplatin + Etoposide   Other Supportive care: Zofran, EMLA  Treatment Team Members  Surgeon: Dr. Hamm  Rad Onc  Palliative  Labs  Diagnostics  6/10/2024 CT CAP w/c: Multiple pulmonary nodules. No prior study for comparison. Based on current Fleischner Society 2017 Guidelines on incidental pulmonary nodule, patients with a known malignancy are at increased risk of metastasis and should receive initial three-month   follow-up chest CT. Mediastinal and bilateral hilar lymphadenopathy concerning for metastatic disease given the patient's history.  Bilateral hilar and mediastinal lymphadenopathy. Right hilar lymph node measures 2.9 x 2.1 cm. Subcarinal lymph node is 2.5 x 1.4 cm. Pretracheal lymph node is 2.2 x 1.1 cm. Left hilar lymph node is 1.3 x 1.1 cm.     Discussion of decision making    I personally reviewed the following lab results, the image studies, pathology, other specialty/physicians consult notes and recommendations, and outside medical records. I had a lengthy discussion with the patient and shared  the work-up findings. We discussed the diagnosis and management plan as below. I spent 43 minutes reviewing the records (labs, clinician notes, outside records, medical history, ordering medicine/tests/procedures, monitoring of anti-neoplastic toxicities, interpreting the imaging/labs previously done) and coordination of care as well as direct time with the patient today, of which greater than 50% of the time was spent in counseling and coordination of care with the patient/family.      Plan/Labs  Treatment would consist of Cisplatin 20 mg/m2 D1-5 + Etoposide 100 mg/m2 D1-5 for 4 cycles (good risk Stage III pure seminoma), consent being obtained today  C4 coming up with preceding labs  F/u Dietitian   Refilled the Zofran today  Restaging CT ordered 10/14/2024       Follow Up: q3 weeks scheduled thru 10/21/2024    All questions were answered to the patient's satisfaction during this encounter. The patient knows the contact information for our office and knows to reach out for any relevant concerns related to this encounter. They are to call for any temperature 100.4 or higher, new symptoms including but not restricted to shaking chills, decreased appetite, nausea, vomiting, diarrhea, increased fatigue, shortness of breath or chest pain, confusion, and not feeling the strength to come to the clinic. For all other listed problems and medical diagnosis in their chart - they are managed by PCP and/or other specialists, which the patient acknowledges. Thank you very much for your consultation and making us a part of this patient's care. We are continuing to follow closely with you. Please do not hesitate to reach out to me with any additional questions or concerns.    Shreyas Amezcua MD  Hematology & Medical Oncology Staff Physician             Disclaimer: This document was prepared using [employer] technology. If a word or phrase is confusing, or does not make sense, this is likely due to recognition  error which was not discovered during this clinician's review. If you believe an error has occurred, please contact me through [facility] service line for kirill?cation.      ONCOLOGY HISTORY OF PRESENT ILLNESS        Oncology History   Seminoma of descended right testis (HCC)   5/24/2024 -  Cancer Staged    Staging form: Testis, AJCC 8th Edition  - Pathologic stage from 5/24/2024: Stage IIIA (pT1b, cM1a, S0) - Signed by Shreyas Amezcua MD on 6/26/2024  Stage prefix: Initial diagnosis  Residual tumor (R): R0 - None       6/11/2024 Initial Diagnosis    Seminoma of descended right testis (HCC)     7/15/2024 -  Chemotherapy    alteplase (CATHFLO), 2 mg, Intracatheter, Every 1 Minute as needed, 3 of 4 cycles  Administration: 2 mg (9/9/2024)  CISplatin (PLATINOL) infusion, 20 mg/m2 = 38.2 mg, Intravenous, Once, 3 of 4 cycles  Administration: 38.2 mg (7/15/2024), 38.2 mg (7/16/2024), 38.2 mg (7/17/2024), 38.2 mg (7/18/2024), 38.2 mg (7/19/2024), 38.2 mg (8/12/2024), 38.2 mg (8/13/2024), 38.2 mg (8/14/2024), 38.2 mg (8/15/2024), 38.2 mg (8/16/2024), 38.2 mg (9/9/2024), 38.2 mg (9/10/2024), 38.2 mg (9/11/2024), 38.2 mg (9/12/2024), 38.2 mg (9/13/2024)  fosaprepitant (EMEND) IVPB, 150 mg, Intravenous, Once, 3 of 4 cycles  Administration: 150 mg (7/15/2024), 150 mg (8/12/2024), 150 mg (9/9/2024)  etoposide (TOPOSAR), 100 mg/m2 = 191 mg, Intravenous, Once, 3 of 4 cycles  Administration: 191 mg (7/15/2024), 191 mg (7/16/2024), 191 mg (7/17/2024), 191 mg (7/18/2024), 191 mg (7/19/2024), 191 mg (8/12/2024), 191 mg (8/13/2024), 191 mg (8/14/2024), 191 mg (8/15/2024), 191 mg (8/16/2024), 191 mg (9/9/2024), 191 mg (9/10/2024), 191 mg (9/11/2024), 191 mg (9/12/2024), 191 mg (9/13/2024)           SUBJECTIVE  (INTERVAL HISTORY)      Clotting History None   Bleeding History None   Cancer History R Testicular   Family Cancer History None   H/O Blood/Plt Transfusion None   Tobacco/etoh/drug abuse 1 PPD x 3 years, quit at age 26, no etoh  abuse, does recreational marijuana           Occupation  at a [employer]     Some more nausea last week and improved markedly on the Zofran.  LH last week and some flashing sensation (L eye).    Pain: none     No issues or concerns from his part.    I have reviewed the relevant past medical, surgical, social and family history. I have also reviewed allergies and medications for this patient.    Review of Systems  Baseline weight: 165-175 lbs    Denies F/C, N/V, SOB, CP, HA, rash, itching, gen weakness, melena, hematuria, hematochezia, falls, diarrhea, or constipation       A 10-point review of system was performed, pertinent positive and negative were detailed as above. Otherwise, the 10-point review of system was negative.      Past Medical History:   Diagnosis Date    Hypertension     resolved    Meniere's disease     left sided    PONV (postoperative nausea and vomiting)     Single kidney     left    Vertigo        Past Surgical History:   Procedure Laterality Date    HERNIA REPAIR      IR PORT PLACEMENT  2024    NASAL SINUS SURGERY Left 2020    NEPHRECTOMY Right     MS ORCHIECTOMY RADICAL TUMOR INGUINAL APPROACH Right 2024    Procedure: ORCHIECTOMY INGUINAL;  Surgeon: Nathan Hamm MD;  [facility]: [facility];  Service: Urology       Family History   Problem Relation Age of Onset    Hypertension Mother     Heart disease Father         cardiac pacemaker    Hypertension Father        Social History     Socioeconomic History    Marital status: /Civil Union     Spouse name: Not on file    Number of children: Not on file    Years of education: Not on file    Highest education level: Not on file   Occupational History    Not on file   Tobacco Use    Smoking status: Former     Current packs/day: 0.00     Types: Cigarettes     Quit date: 2018     Years since quittin.0     Passive exposure: Past    Smokeless tobacco: Never   Vaping Use    Vaping status: Never Used    Substance and Sexual Activity    Alcohol use: Not Currently    Drug use: Yes     Frequency: 7.0 times per week     Types: Marijuana     Comment: vidhi 5.23.24    Sexual activity: Not on file   Other Topics Concern    Not on file   Social History Narrative    Denied: Always uses seat belt    Caffeine use: one 16 oz cups of coffee, 1 cup of soda    Does not exercise     Social Determinants of Health     Financial Resource Strain: Not on file   Food Insecurity: Not on file   Transportation Needs: Not on file   Physical Activity: Not on file   Stress: Not on file   Social Connections: Not on file   Intimate Partner Violence: Not on file   Housing Stability: Not on file       No Known Allergies    Current Outpatient Medications   Medication Sig Dispense Refill    Apple Cider Vinegar 188 MG CAPS Take by mouth      Cholecalciferol (Vitamin D3) 125 MCG (5000 UT) TABS Take 5,000 Units by mouth daily      magnesium 30 MG tablet Take 30 mg by mouth 2 (two) times a day      Omega-3 Fatty Acids (Fish Oil) 300 MG CAPS Take by mouth      ondansetron (ZOFRAN-ODT) 8 mg disintegrating tablet Take 1 tablet (8 mg total) by mouth every 8 (eight) hours as needed for nausea or vomiting 30 tablet 3    prochlorperazine (COMPAZINE) 10 mg tablet Take 1 tablet (10 mg total) by mouth every 6 (six) hours as needed for nausea or vomiting 30 tablet 1    Pyridoxine HCl (VITAMIN B6 PO) Take by mouth      Turmeric (QC TUMERIC COMPLEX PO) Take by mouth       No current facility-administered medications for this visit.       (Not in a hospital admission)      Objective:     24 Hour Vitals Assessment:     Vitals:    09/23/24 1034   BP: 128/70   Pulse: 76   Resp: 16   Temp: (!) 97.4 °F (36.3 °C)   SpO2: 98%           PHYSICIAN EXAM:    General: Appearance: alert, cooperative, no distress.  HEENT: Normocephalic, atraumatic. No scleral icterus. conjunctivae clear. EOMI.  Chest: No tenderness to palpation. No open wound noted.  Lungs: Clear to  auscultation bilaterally, Respirations unlabored.  Cardiac: Regular rate and rhythm, +S1and S2, -r/m/g  Abdomen: Soft, non-tender, non-distended. Bowel sounds are normal.  Extremities:  No edema, cyanosis, clubbing.  Skin: Skin color, turgor are normal. No rashes.  Lymphatics: no palpable supra-cervical, axillary, or inguinal adenopathy  Neurologic: Awake, Alert, and oriented, no gross focal deficits noted b/l.       DATA REVIEW:    Pathology Result:    Final Diagnosis   Date Value Ref Range Status   05/24/2024   Final    A.  Right testicle and spermatic cord (radical inguinal orchiectomy):     - Seminoma (4.5 cm).      - No definitive lymph-vascular invasion.     - Tunica vaginalis negative for tumor.     - Surgical margin negative for tumor.          Image Results:   Image result are reviewed and documented in Hematology/Oncology history    IR port placement  Narrative: Examination: Venous port placement.  Venous access with ultrasound guidance, tunneled port insertion under fluoroscopic guidance.    HISTORY: Infusion of chemotherapy  Patient with history of testicular cancer    PROCEDURE: Informed consent was obtained.  The right chest and neck was prepped and draped in usual sterile fashion.  Timeout was performed.  Lidocaine was given a as local anesthesia.    Ultrasound guidance was used to cannulate the right internal jugular    Using fluoroscopic guidance, a wire was advanced centrally.    Lidocaine was then given over the chest wall.  An incision was made in the chest/upper arm, and a pocket was created using a combination of sharp, and blunt dissection.  The port was inserted into the pocket.  The catheter was tunneled subcutaneously to   the venotomy site, and trimmed to appropriate length.  The catheter was advanced via a peel-away sheath, into the vein, under fluoroscopic guidance.    The incision was closed in layers.  Glue was applied to the surface of the skin, and the venotomy site.    FINDINGS:  Ultrasound shows a widely patent vein.  It was compressible and free of thrombus.  Fluoroscopy shows final catheter position of: Cavoatrial region    Power injection compatible: Yes  Impression: Technically successful placement of venous access port    This is the end of the clinically relevant portion of the report.  Subsequent information is for compliance, documentation, and coding purposes.    In the process of informed consent, information was communicated, verbally, to the patient regarding the procedure.  The patient was informed of; the name of the procedure, nature of the procedure, nature of the condition, risks, benefits, alternatives,   and potential complications, as well as the consequences of not having the procedure.  All the patient's questions were answered.  Informed consent was signed.  Quoted risks included infection, and venous thrombosis.    Automated exposure control was utilized, and there was minimize, in accordance with the application of the ALARA technique.    Chlorhexidine and alcohol was used for cleansing and sterile preparation of the skin.  This was allowed to dry prior to the application of sterile draping.    Timeout was performed, with all team members present, and in agreement.  Confirmation of patient, procedure, laterally, allergies, and all needed equipment was performed.    The patient was examined, and is in satisfactory condition for planned moderate sedation.  Mallampati score: 2  Intravenous conscious sedation was provided.  There was continuous monitoring of blood pressure, heart rate, respiratory rate, and oxygen saturation by an independent, trained observer.  Conscious sedation time: 60 minutes  Versed dose: 1 Milligrams  Fentanyl dose: 100 Micrograms  After the procedure, the patient was recovered, and return to their baseline status, and was deemed fit for discharge from .  Fluoroscopy time:  0.5    minutes  Radiation dose: 12.2 MilliGray    PROCEDURE: Port  "placement  Preprocedure diagnosis: Please see \"history \", above  Postprocedure diagnosis: Same  Antibiotics: Ancef 2 g  Specimens: None  Estimated blood loss: Less than 5 mL    Anesthesia: Local and monitored intravenous conscious sedation    Maximal sterile barrier technique, according to current guidelines, were utilized.  These include, but are not limited to: Hat, mask, and shoe covers for all staff.  Sterile gown and gloves for all operators.  A sterile cover was used for the ultrasound   probe.  The patient was covered, from head to toe, in sterile drapes.    Ultrasound was used to evaluate the above-named access vein as a potential access site.  It was compressible and free of thrombus.  Static and real-time images of needle entry were obtained, and archived.    Workstation performed: [id number]      LABS:  Lab data are reviewed and documented in [facility] history.       Lab Results   Component Value Date    HGB 11.4 (L) 09/06/2024    HCT 33.0 (L) 09/06/2024    MCV 91 09/06/2024     09/06/2024    WBC 4.82 09/06/2024    NRBC 0 09/06/2024     Lab Results   Component Value Date     12/16/2017    K 3.7 09/06/2024     09/06/2024    CO2 29 09/06/2024    BUN 10 09/06/2024    CREATININE 1.26 09/06/2024    GLUF 146 (H) 08/09/2024    CALCIUM 8.9 09/06/2024    AST 25 09/06/2024    ALT 29 09/06/2024    ALKPHOS 49 09/06/2024    PROT 7.3 12/16/2017    BILITOT 1.0 12/16/2017    EGFR 73 09/06/2024       No results found for: \"IRON\", \"TIBC\", \"FERRITIN\"    Lab Results   Component Value Date    VAOCEHBC00 400 02/14/2022       No results for input(s): \"WBC\", \"CREAT\", \"PLT\" in the last 72 hours.      By:  Shreyas Amezcua MD, 9/23/2024, 10:47 AM                                  "

## 2024-09-16 ENCOUNTER — HOSPITAL ENCOUNTER (OUTPATIENT)
Dept: INFUSION CENTER | Facility: CLINIC | Age: 34
Discharge: HOME | End: 2024-09-16
Payer: COMMERCIAL

## 2024-09-16 VITALS
HEART RATE: 87 BPM | SYSTOLIC BLOOD PRESSURE: 129 MMHG | DIASTOLIC BLOOD PRESSURE: 92 MMHG | RESPIRATION RATE: 18 BRPM | TEMPERATURE: 99.1 F

## 2024-09-16 DIAGNOSIS — R11.2 CHEMOTHERAPY INDUCED NAUSEA AND VOMITING: ICD-10-CM

## 2024-09-16 DIAGNOSIS — T45.1X5A CHEMOTHERAPY INDUCED NAUSEA AND VOMITING: ICD-10-CM

## 2024-09-16 DIAGNOSIS — C62.11 SEMINOMA OF DESCENDED RIGHT TESTIS (HCC): ICD-10-CM

## 2024-09-16 DIAGNOSIS — N18.31 STAGE 3A CHRONIC KIDNEY DISEASE (HCC): Primary | ICD-10-CM

## 2024-09-16 PROCEDURE — 96360 HYDRATION IV INFUSION INIT: CPT

## 2024-09-16 RX ADMIN — SODIUM CHLORIDE 1000 ML: 0.9 INJECTION, SOLUTION INTRAVENOUS at 09:15

## 2024-09-16 NOTE — PROGRESS NOTES
Pt arrives today not feeling well from the weekend.  Is able to keep smoothies down.  Tolerated hydration today without complications. Confirmed next cycle on 9-30-24 0800, which will be his last.  AVs declined.

## 2024-09-23 ENCOUNTER — OFFICE VISIT (OUTPATIENT)
Dept: HEMATOLOGY ONCOLOGY | Facility: CLINIC | Age: 34
End: 2024-09-23
Payer: COMMERCIAL

## 2024-09-23 VITALS
HEART RATE: 76 BPM | BODY MASS INDEX: 24.88 KG/M2 | DIASTOLIC BLOOD PRESSURE: 70 MMHG | OXYGEN SATURATION: 98 % | RESPIRATION RATE: 16 BRPM | SYSTOLIC BLOOD PRESSURE: 128 MMHG | TEMPERATURE: 97.4 F | HEIGHT: 67 IN | WEIGHT: 158.5 LBS

## 2024-09-23 DIAGNOSIS — T45.1X5A CHEMOTHERAPY INDUCED NAUSEA AND VOMITING: ICD-10-CM

## 2024-09-23 DIAGNOSIS — R11.2 CHEMOTHERAPY INDUCED NAUSEA AND VOMITING: ICD-10-CM

## 2024-09-23 DIAGNOSIS — C62.11 SEMINOMA OF DESCENDED RIGHT TESTIS (HCC): Primary | ICD-10-CM

## 2024-09-23 PROCEDURE — 99215 OFFICE O/P EST HI 40 MIN: CPT | Performed by: INTERNAL MEDICINE

## 2024-09-23 RX ORDER — SODIUM CHLORIDE 9 MG/ML
20 INJECTION, SOLUTION INTRAVENOUS ONCE
OUTPATIENT
Start: 2024-10-03

## 2024-09-23 RX ORDER — ONDANSETRON 8 MG/1
8 TABLET, ORALLY DISINTEGRATING ORAL EVERY 8 HOURS PRN
Qty: 30 TABLET | Refills: 3 | Status: SHIPPED | OUTPATIENT
Start: 2024-09-23

## 2024-09-23 RX ORDER — SODIUM CHLORIDE 9 MG/ML
20 INJECTION, SOLUTION INTRAVENOUS ONCE
OUTPATIENT
Start: 2024-10-01

## 2024-09-23 RX ORDER — SODIUM CHLORIDE 9 MG/ML
20 INJECTION, SOLUTION INTRAVENOUS ONCE
OUTPATIENT
Start: 2024-10-02

## 2024-09-23 RX ORDER — SODIUM CHLORIDE 9 MG/ML
20 INJECTION, SOLUTION INTRAVENOUS ONCE
OUTPATIENT
Start: 2024-10-04

## 2024-09-23 RX ORDER — SODIUM CHLORIDE 9 MG/ML
20 INJECTION, SOLUTION INTRAVENOUS ONCE
OUTPATIENT
Start: 2024-09-30

## 2024-09-27 ENCOUNTER — APPOINTMENT (OUTPATIENT)
Age: 34
End: 2024-09-27
Payer: COMMERCIAL

## 2024-09-27 DIAGNOSIS — C62.11 SEMINOMA OF DESCENDED RIGHT TESTIS (HCC): ICD-10-CM

## 2024-09-27 DIAGNOSIS — E83.42 HYPOMAGNESEMIA: ICD-10-CM

## 2024-09-27 LAB
ALBUMIN SERPL BCG-MCNC: 3.8 G/DL (ref 3.5–5)
ALP SERPL-CCNC: 56 U/L (ref 34–104)
ALT SERPL W P-5'-P-CCNC: 19 U/L (ref 7–52)
ANION GAP SERPL CALCULATED.3IONS-SCNC: 6 MMOL/L (ref 4–13)
AST SERPL W P-5'-P-CCNC: 18 U/L (ref 13–39)
BASOPHILS # BLD AUTO: 0 THOUSANDS/ΜL (ref 0–0.1)
BASOPHILS NFR BLD AUTO: 0 % (ref 0–1)
BILIRUB SERPL-MCNC: 0.39 MG/DL (ref 0.2–1)
BUN SERPL-MCNC: 7 MG/DL (ref 5–25)
CALCIUM SERPL-MCNC: 9 MG/DL (ref 8.4–10.2)
CHLORIDE SERPL-SCNC: 99 MMOL/L (ref 96–108)
CO2 SERPL-SCNC: 29 MMOL/L (ref 21–32)
CREAT SERPL-MCNC: 1.17 MG/DL (ref 0.6–1.3)
EOSINOPHIL # BLD AUTO: 0.01 THOUSAND/ΜL (ref 0–0.61)
EOSINOPHIL NFR BLD AUTO: 1 % (ref 0–6)
ERYTHROCYTE [DISTWIDTH] IN BLOOD BY AUTOMATED COUNT: 14.7 % (ref 11.6–15.1)
GFR SERPL CREATININE-BSD FRML MDRD: 80 ML/MIN/1.73SQ M
GLUCOSE SERPL-MCNC: 111 MG/DL (ref 65–140)
HCT VFR BLD AUTO: 27.8 % (ref 36.5–49.3)
HGB BLD-MCNC: 9.7 G/DL (ref 12–17)
IMM GRANULOCYTES # BLD AUTO: 0.01 THOUSAND/UL (ref 0–0.2)
IMM GRANULOCYTES NFR BLD AUTO: 1 % (ref 0–2)
LYMPHOCYTES # BLD AUTO: 1.03 THOUSANDS/ΜL (ref 0.6–4.47)
LYMPHOCYTES NFR BLD AUTO: 68 % (ref 14–44)
MAGNESIUM SERPL-MCNC: 1.7 MG/DL (ref 1.9–2.7)
MCH RBC QN AUTO: 32.1 PG (ref 26.8–34.3)
MCHC RBC AUTO-ENTMCNC: 34.9 G/DL (ref 31.4–37.4)
MCV RBC AUTO: 92 FL (ref 82–98)
MONOCYTES # BLD AUTO: 0.32 THOUSAND/ΜL (ref 0.17–1.22)
MONOCYTES NFR BLD AUTO: 21 % (ref 4–12)
NEUTROPHILS # BLD AUTO: 0.13 THOUSANDS/ΜL (ref 1.85–7.62)
NEUTS SEG NFR BLD AUTO: 9 % (ref 43–75)
NRBC BLD AUTO-RTO: 0 /100 WBCS
PLATELET # BLD AUTO: 179 THOUSANDS/UL (ref 149–390)
PMV BLD AUTO: 10.1 FL (ref 8.9–12.7)
POTASSIUM SERPL-SCNC: 4.2 MMOL/L (ref 3.5–5.3)
PROT SERPL-MCNC: 6 G/DL (ref 6.4–8.4)
RBC # BLD AUTO: 3.02 MILLION/UL (ref 3.88–5.62)
SODIUM SERPL-SCNC: 134 MMOL/L (ref 135–147)
WBC # BLD AUTO: 1.5 THOUSAND/UL (ref 4.31–10.16)

## 2024-09-27 PROCEDURE — 83735 ASSAY OF MAGNESIUM: CPT

## 2024-09-27 PROCEDURE — 85025 COMPLETE CBC W/AUTO DIFF WBC: CPT

## 2024-09-27 PROCEDURE — 80053 COMPREHEN METABOLIC PANEL: CPT

## 2024-09-30 ENCOUNTER — HOSPITAL ENCOUNTER (OUTPATIENT)
Dept: INFUSION CENTER | Facility: CLINIC | Age: 34
Discharge: HOME | End: 2024-09-30

## 2024-09-30 ENCOUNTER — TELEPHONE (OUTPATIENT)
Dept: HEMATOLOGY ONCOLOGY | Facility: CLINIC | Age: 34
End: 2024-09-30

## 2024-09-30 VITALS
HEART RATE: 98 BPM | RESPIRATION RATE: 18 BRPM | BODY MASS INDEX: 23.75 KG/M2 | WEIGHT: 151.68 LBS | TEMPERATURE: 98.8 F | SYSTOLIC BLOOD PRESSURE: 119 MMHG | DIASTOLIC BLOOD PRESSURE: 82 MMHG

## 2024-09-30 DIAGNOSIS — E83.42 HYPOMAGNESEMIA: ICD-10-CM

## 2024-09-30 DIAGNOSIS — C62.11 SEMINOMA OF DESCENDED RIGHT TESTIS (HCC): Primary | ICD-10-CM

## 2024-09-30 DIAGNOSIS — C62.11 SEMINOMA OF DESCENDED RIGHT TESTIS (HCC): ICD-10-CM

## 2024-09-30 NOTE — TELEPHONE ENCOUNTER
----- Message from Shreyas Amezcua MD sent at 9/29/2024  3:01 PM EDT -----  Need to defer a week as ANC too low, repeat CBC in a week        Phoned patient with above information.  Patient aware.  Patient with question if 4th cycle needed, or if scan could be completed after cycle 3?  Will review with Dr. Amezcua.

## 2024-09-30 NOTE — PROGRESS NOTES
Treatment held for Anc of .13. Patient aware to avoid sick people and wear a mask in public. Reviewed to wash hands. We are working on rescheduling his appointments. Patient is aware.

## 2024-09-30 NOTE — PROGRESS NOTES
Spoke with Zhanna Fraga RN and per Dr. Amezcua, patient's treatment to be deferred 1 week due to . Zhanna to call patient and patient will be rescheduled. Pharmacy aware.

## 2024-10-04 ENCOUNTER — TELEPHONE (OUTPATIENT)
Dept: INFUSION CENTER | Facility: CLINIC | Age: 34
End: 2024-10-04

## 2024-10-04 NOTE — TELEPHONE ENCOUNTER
Message left on pt's voicemail to remind him he needs labs done prior to treatment on Monday. Pt typically goes on Fridays. Pt encouraged to call infusion center if he has any questions.

## 2024-10-05 ENCOUNTER — APPOINTMENT (OUTPATIENT)
Dept: LAB | Facility: CLINIC | Age: 34
End: 2024-10-05
Payer: COMMERCIAL

## 2024-10-05 DIAGNOSIS — E83.42 HYPOMAGNESEMIA: ICD-10-CM

## 2024-10-05 DIAGNOSIS — C62.11 SEMINOMA OF DESCENDED RIGHT TESTIS (HCC): ICD-10-CM

## 2024-10-05 LAB
ALBUMIN SERPL BCG-MCNC: 3.8 G/DL (ref 3.5–5)
ALP SERPL-CCNC: 48 U/L (ref 34–104)
ALT SERPL W P-5'-P-CCNC: 32 U/L (ref 7–52)
ANION GAP SERPL CALCULATED.3IONS-SCNC: 8 MMOL/L (ref 4–13)
AST SERPL W P-5'-P-CCNC: 28 U/L (ref 13–39)
BASOPHILS # BLD AUTO: 0.02 THOUSANDS/ΜL (ref 0–0.1)
BASOPHILS NFR BLD AUTO: 0 % (ref 0–1)
BILIRUB SERPL-MCNC: 0.33 MG/DL (ref 0.2–1)
BUN SERPL-MCNC: 10 MG/DL (ref 5–25)
CALCIUM SERPL-MCNC: 9.1 MG/DL (ref 8.4–10.2)
CHLORIDE SERPL-SCNC: 102 MMOL/L (ref 96–108)
CO2 SERPL-SCNC: 27 MMOL/L (ref 21–32)
CREAT SERPL-MCNC: 1.13 MG/DL (ref 0.6–1.3)
EOSINOPHIL # BLD AUTO: 0.01 THOUSAND/ΜL (ref 0–0.61)
EOSINOPHIL NFR BLD AUTO: 0 % (ref 0–6)
ERYTHROCYTE [DISTWIDTH] IN BLOOD BY AUTOMATED COUNT: 15.4 % (ref 11.6–15.1)
GFR SERPL CREATININE-BSD FRML MDRD: 83 ML/MIN/1.73SQ M
GLUCOSE P FAST SERPL-MCNC: 87 MG/DL (ref 65–99)
HCT VFR BLD AUTO: 29.9 % (ref 36.5–49.3)
HGB BLD-MCNC: 9.9 G/DL (ref 12–17)
IMM GRANULOCYTES # BLD AUTO: 0.05 THOUSAND/UL (ref 0–0.2)
IMM GRANULOCYTES NFR BLD AUTO: 1 % (ref 0–2)
LYMPHOCYTES # BLD AUTO: 1.45 THOUSANDS/ΜL (ref 0.6–4.47)
LYMPHOCYTES NFR BLD AUTO: 29 % (ref 14–44)
MAGNESIUM SERPL-MCNC: 1.7 MG/DL (ref 1.9–2.7)
MCH RBC QN AUTO: 31.2 PG (ref 26.8–34.3)
MCHC RBC AUTO-ENTMCNC: 33.1 G/DL (ref 31.4–37.4)
MCV RBC AUTO: 94 FL (ref 82–98)
MONOCYTES # BLD AUTO: 0.57 THOUSAND/ΜL (ref 0.17–1.22)
MONOCYTES NFR BLD AUTO: 12 % (ref 4–12)
NEUTROPHILS # BLD AUTO: 2.86 THOUSANDS/ΜL (ref 1.85–7.62)
NEUTS SEG NFR BLD AUTO: 58 % (ref 43–75)
NRBC BLD AUTO-RTO: 0 /100 WBCS
PLATELET # BLD AUTO: 359 THOUSANDS/UL (ref 149–390)
PMV BLD AUTO: 9.5 FL (ref 8.9–12.7)
POTASSIUM SERPL-SCNC: 4 MMOL/L (ref 3.5–5.3)
PROT SERPL-MCNC: 6.1 G/DL (ref 6.4–8.4)
RBC # BLD AUTO: 3.17 MILLION/UL (ref 3.88–5.62)
SODIUM SERPL-SCNC: 137 MMOL/L (ref 135–147)
WBC # BLD AUTO: 4.96 THOUSAND/UL (ref 4.31–10.16)

## 2024-10-05 PROCEDURE — 85025 COMPLETE CBC W/AUTO DIFF WBC: CPT

## 2024-10-05 PROCEDURE — 83735 ASSAY OF MAGNESIUM: CPT

## 2024-10-05 PROCEDURE — 36415 COLL VENOUS BLD VENIPUNCTURE: CPT

## 2024-10-05 PROCEDURE — 80053 COMPREHEN METABOLIC PANEL: CPT

## 2024-10-07 ENCOUNTER — HOSPITAL ENCOUNTER (OUTPATIENT)
Dept: INFUSION CENTER | Facility: CLINIC | Age: 34
Discharge: HOME | End: 2024-10-07
Payer: COMMERCIAL

## 2024-10-07 VITALS
BODY MASS INDEX: 24.36 KG/M2 | HEART RATE: 103 BPM | DIASTOLIC BLOOD PRESSURE: 89 MMHG | WEIGHT: 155.2 LBS | TEMPERATURE: 97 F | RESPIRATION RATE: 18 BRPM | HEIGHT: 67 IN | SYSTOLIC BLOOD PRESSURE: 135 MMHG

## 2024-10-07 DIAGNOSIS — C62.11 SEMINOMA OF DESCENDED RIGHT TESTIS (HCC): ICD-10-CM

## 2024-10-07 DIAGNOSIS — E83.42 HYPOMAGNESEMIA: Primary | ICD-10-CM

## 2024-10-07 PROCEDURE — 96417 CHEMO IV INFUS EACH ADDL SEQ: CPT

## 2024-10-07 PROCEDURE — 96413 CHEMO IV INFUSION 1 HR: CPT

## 2024-10-07 PROCEDURE — 96366 THER/PROPH/DIAG IV INF ADDON: CPT

## 2024-10-07 PROCEDURE — 96367 TX/PROPH/DG ADDL SEQ IV INF: CPT

## 2024-10-07 RX ORDER — SODIUM CHLORIDE 9 MG/ML
20 INJECTION, SOLUTION INTRAVENOUS ONCE
Status: COMPLETED | OUTPATIENT
Start: 2024-10-07 | End: 2024-10-07

## 2024-10-07 RX ADMIN — CISPLATIN 38.2 MG: 1 INJECTION, SOLUTION INTRAVENOUS at 10:50

## 2024-10-07 RX ADMIN — SODIUM CHLORIDE 191 MG: 0.9 INJECTION, SOLUTION INTRAVENOUS at 11:56

## 2024-10-07 RX ADMIN — DEXAMETHASONE SODIUM PHOSPHATE: 10 INJECTION, SOLUTION INTRAMUSCULAR; INTRAVENOUS at 09:45

## 2024-10-07 RX ADMIN — MAGNESIUM SULFATE HEPTAHYDRATE: 500 INJECTION, SOLUTION INTRAMUSCULAR; INTRAVENOUS at 12:57

## 2024-10-07 RX ADMIN — FOSAPREPITANT 150 MG: 150 INJECTION, POWDER, LYOPHILIZED, FOR SOLUTION INTRAVENOUS at 10:10

## 2024-10-07 RX ADMIN — SODIUM CHLORIDE 20 ML/HR: 0.9 INJECTION, SOLUTION INTRAVENOUS at 08:30

## 2024-10-07 RX ADMIN — MAGNESIUM SULFATE HEPTAHYDRATE: 500 INJECTION, SOLUTION INTRAMUSCULAR; INTRAVENOUS at 08:38

## 2024-10-07 NOTE — PROGRESS NOTES
MED TIME OUT with Audrey Qiu RN: Add 1 gm Mag to pre and post hydration. Treating RN and pharmacy aware.

## 2024-10-07 NOTE — PROGRESS NOTES
Patient tolerated treatment without complication. AVS declined, aware to return tomorrow @ 10:00, patient left unit in stable condition.

## 2024-10-07 NOTE — PROGRESS NOTES
Magnesium resulted at 1.7, recommended 2 grams of magnesium total per Dr. Amezcua. Call out to [facility] spoke with BRIAN Tsang.

## 2024-10-08 ENCOUNTER — HOSPITAL ENCOUNTER (OUTPATIENT)
Dept: INFUSION CENTER | Facility: CLINIC | Age: 34
Discharge: HOME | End: 2024-10-08
Payer: COMMERCIAL

## 2024-10-08 VITALS
TEMPERATURE: 98.9 F | HEART RATE: 99 BPM | DIASTOLIC BLOOD PRESSURE: 81 MMHG | SYSTOLIC BLOOD PRESSURE: 137 MMHG | HEIGHT: 67 IN | BODY MASS INDEX: 24.53 KG/M2 | RESPIRATION RATE: 18 BRPM | WEIGHT: 156.31 LBS

## 2024-10-08 DIAGNOSIS — C62.11 SEMINOMA OF DESCENDED RIGHT TESTIS (HCC): ICD-10-CM

## 2024-10-08 DIAGNOSIS — E83.42 HYPOMAGNESEMIA: Primary | ICD-10-CM

## 2024-10-08 PROCEDURE — 96367 TX/PROPH/DG ADDL SEQ IV INF: CPT

## 2024-10-08 PROCEDURE — 96417 CHEMO IV INFUS EACH ADDL SEQ: CPT

## 2024-10-08 PROCEDURE — 96361 HYDRATE IV INFUSION ADD-ON: CPT

## 2024-10-08 PROCEDURE — 96413 CHEMO IV INFUSION 1 HR: CPT

## 2024-10-08 RX ORDER — SODIUM CHLORIDE 9 MG/ML
20 INJECTION, SOLUTION INTRAVENOUS ONCE
Status: COMPLETED | OUTPATIENT
Start: 2024-10-08 | End: 2024-10-08

## 2024-10-08 RX ADMIN — SODIUM CHLORIDE 20 ML/HR: 0.9 INJECTION, SOLUTION INTRAVENOUS at 10:32

## 2024-10-08 RX ADMIN — SODIUM CHLORIDE 500 ML: 0.9 INJECTION, SOLUTION INTRAVENOUS at 10:33

## 2024-10-08 RX ADMIN — SODIUM CHLORIDE 500 ML: 0.9 INJECTION, SOLUTION INTRAVENOUS at 13:45

## 2024-10-08 RX ADMIN — SODIUM CHLORIDE 191 MG: 0.9 INJECTION, SOLUTION INTRAVENOUS at 12:44

## 2024-10-08 RX ADMIN — CISPLATIN 38.2 MG: 1 INJECTION, SOLUTION INTRAVENOUS at 11:36

## 2024-10-08 RX ADMIN — DEXAMETHASONE SODIUM PHOSPHATE: 10 INJECTION, SOLUTION INTRAMUSCULAR; INTRAVENOUS at 10:38

## 2024-10-08 NOTE — PROGRESS NOTES
Ron Franco  tolerated treatment well with no complications.      Ron Franco is aware of future appt on 10/9 at 9am.     AVS printed and given to Ron Franco:    No (Declined by Ron Franco)

## 2024-10-09 ENCOUNTER — HOSPITAL ENCOUNTER (OUTPATIENT)
Dept: INFUSION CENTER | Facility: CLINIC | Age: 34
Discharge: HOME | End: 2024-10-09
Payer: COMMERCIAL

## 2024-10-09 VITALS
RESPIRATION RATE: 18 BRPM | DIASTOLIC BLOOD PRESSURE: 74 MMHG | WEIGHT: 157.85 LBS | HEIGHT: 67 IN | SYSTOLIC BLOOD PRESSURE: 120 MMHG | TEMPERATURE: 99 F | HEART RATE: 88 BPM | BODY MASS INDEX: 24.78 KG/M2

## 2024-10-09 DIAGNOSIS — E83.42 HYPOMAGNESEMIA: Primary | ICD-10-CM

## 2024-10-09 DIAGNOSIS — C62.11 SEMINOMA OF DESCENDED RIGHT TESTIS (HCC): ICD-10-CM

## 2024-10-09 PROCEDURE — 96361 HYDRATE IV INFUSION ADD-ON: CPT

## 2024-10-09 PROCEDURE — 96367 TX/PROPH/DG ADDL SEQ IV INF: CPT

## 2024-10-09 PROCEDURE — 96417 CHEMO IV INFUS EACH ADDL SEQ: CPT

## 2024-10-09 PROCEDURE — 96413 CHEMO IV INFUSION 1 HR: CPT

## 2024-10-09 RX ORDER — SODIUM CHLORIDE 9 MG/ML
20 INJECTION, SOLUTION INTRAVENOUS ONCE
Status: COMPLETED | OUTPATIENT
Start: 2024-10-09 | End: 2024-10-09

## 2024-10-09 RX ADMIN — CISPLATIN 38.2 MG: 1 INJECTION, SOLUTION INTRAVENOUS at 10:52

## 2024-10-09 RX ADMIN — SODIUM CHLORIDE 500 ML: 0.9 INJECTION, SOLUTION INTRAVENOUS at 09:31

## 2024-10-09 RX ADMIN — SODIUM CHLORIDE 500 ML: 0.9 INJECTION, SOLUTION INTRAVENOUS at 13:03

## 2024-10-09 RX ADMIN — SODIUM CHLORIDE 191 MG: 0.9 INJECTION, SOLUTION INTRAVENOUS at 11:53

## 2024-10-09 RX ADMIN — SODIUM CHLORIDE 20 ML/HR: 0.9 INJECTION, SOLUTION INTRAVENOUS at 09:30

## 2024-10-09 RX ADMIN — DEXAMETHASONE SODIUM PHOSPHATE: 10 INJECTION, SOLUTION INTRAMUSCULAR; INTRAVENOUS at 09:33

## 2024-10-09 NOTE — PROGRESS NOTES
Pt offers no new complaints today, tolerated pre/post hydration, cycle 4 day 3 etoposide/cisplatin without complications, confirmed appt back tomorrow for day 4 0900 , AVS declined.

## 2024-10-10 ENCOUNTER — HOSPITAL ENCOUNTER (OUTPATIENT)
Dept: INFUSION CENTER | Facility: CLINIC | Age: 34
Discharge: HOME | End: 2024-10-10
Payer: COMMERCIAL

## 2024-10-10 VITALS
BODY MASS INDEX: 24.5 KG/M2 | SYSTOLIC BLOOD PRESSURE: 137 MMHG | WEIGHT: 156.09 LBS | TEMPERATURE: 99 F | HEIGHT: 67 IN | RESPIRATION RATE: 18 BRPM | DIASTOLIC BLOOD PRESSURE: 81 MMHG | HEART RATE: 54 BPM

## 2024-10-10 DIAGNOSIS — C62.11 SEMINOMA OF DESCENDED RIGHT TESTIS (HCC): ICD-10-CM

## 2024-10-10 DIAGNOSIS — E83.42 HYPOMAGNESEMIA: Primary | ICD-10-CM

## 2024-10-10 PROCEDURE — 96417 CHEMO IV INFUS EACH ADDL SEQ: CPT

## 2024-10-10 PROCEDURE — 96367 TX/PROPH/DG ADDL SEQ IV INF: CPT

## 2024-10-10 PROCEDURE — 96413 CHEMO IV INFUSION 1 HR: CPT

## 2024-10-10 PROCEDURE — 96361 HYDRATE IV INFUSION ADD-ON: CPT

## 2024-10-10 RX ORDER — SODIUM CHLORIDE 9 MG/ML
20 INJECTION, SOLUTION INTRAVENOUS ONCE
Status: COMPLETED | OUTPATIENT
Start: 2024-10-10 | End: 2024-10-10

## 2024-10-10 RX ADMIN — CISPLATIN 38.2 MG: 1 INJECTION, SOLUTION INTRAVENOUS at 11:00

## 2024-10-10 RX ADMIN — DEXAMETHASONE SODIUM PHOSPHATE: 10 INJECTION, SOLUTION INTRAMUSCULAR; INTRAVENOUS at 09:47

## 2024-10-10 RX ADMIN — SODIUM CHLORIDE 191 MG: 0.9 INJECTION, SOLUTION INTRAVENOUS at 12:05

## 2024-10-10 RX ADMIN — SODIUM CHLORIDE 500 ML: 0.9 INJECTION, SOLUTION INTRAVENOUS at 09:44

## 2024-10-10 RX ADMIN — SODIUM CHLORIDE 20 ML/HR: 0.9 INJECTION, SOLUTION INTRAVENOUS at 09:44

## 2024-10-10 RX ADMIN — SODIUM CHLORIDE 500 ML: 0.9 INJECTION, SOLUTION INTRAVENOUS at 13:09

## 2024-10-10 NOTE — PROGRESS NOTES
Pt offers no new complaints today, tolerated cycle 4 day 4 cisplatin/etoposide without complications, confirmed appt back tomorrow 0900, AVS declined

## 2024-10-11 ENCOUNTER — HOSPITAL ENCOUNTER (OUTPATIENT)
Dept: INFUSION CENTER | Facility: CLINIC | Age: 34
End: 2024-10-11
Payer: COMMERCIAL

## 2024-10-11 VITALS
TEMPERATURE: 98.8 F | WEIGHT: 153.88 LBS | RESPIRATION RATE: 18 BRPM | HEART RATE: 76 BPM | BODY MASS INDEX: 24.15 KG/M2 | HEIGHT: 67 IN | SYSTOLIC BLOOD PRESSURE: 134 MMHG | DIASTOLIC BLOOD PRESSURE: 88 MMHG

## 2024-10-11 DIAGNOSIS — C62.11 SEMINOMA OF DESCENDED RIGHT TESTIS (HCC): ICD-10-CM

## 2024-10-11 DIAGNOSIS — E83.42 HYPOMAGNESEMIA: Primary | ICD-10-CM

## 2024-10-11 PROCEDURE — 96361 HYDRATE IV INFUSION ADD-ON: CPT

## 2024-10-11 PROCEDURE — 96417 CHEMO IV INFUS EACH ADDL SEQ: CPT

## 2024-10-11 PROCEDURE — 96413 CHEMO IV INFUSION 1 HR: CPT

## 2024-10-11 PROCEDURE — 96367 TX/PROPH/DG ADDL SEQ IV INF: CPT

## 2024-10-11 RX ORDER — SODIUM CHLORIDE 9 MG/ML
20 INJECTION, SOLUTION INTRAVENOUS ONCE
Status: COMPLETED | OUTPATIENT
Start: 2024-10-11 | End: 2024-10-11

## 2024-10-11 RX ADMIN — CISPLATIN 38.2 MG: 1 INJECTION, SOLUTION INTRAVENOUS at 10:36

## 2024-10-11 RX ADMIN — SODIUM CHLORIDE 500 ML: 0.9 INJECTION, SOLUTION INTRAVENOUS at 13:06

## 2024-10-11 RX ADMIN — SODIUM CHLORIDE 20 ML/HR: 0.9 INJECTION, SOLUTION INTRAVENOUS at 09:26

## 2024-10-11 RX ADMIN — SODIUM CHLORIDE 191 MG: 0.9 INJECTION, SOLUTION INTRAVENOUS at 12:01

## 2024-10-11 RX ADMIN — SODIUM CHLORIDE 500 ML: 0.9 INJECTION, SOLUTION INTRAVENOUS at 09:26

## 2024-10-11 RX ADMIN — DEXAMETHASONE SODIUM PHOSPHATE: 10 INJECTION, SOLUTION INTRAMUSCULAR; INTRAVENOUS at 09:29

## 2024-10-11 NOTE — PROGRESS NOTES
Patient arrived to the unit and denied complications or infection. Patient tolerated his treatment well without adverse reaction. He is aware of his hydration appointment on 10/14/24.

## 2024-10-11 NOTE — PLAN OF CARE
Problem: INFECTION - ADULT  Goal: Absence or prevention of progression during hospitalization  Description: INTERVENTIONS:  - Assess and monitor for signs and symptoms of infection  - Monitor lab/diagnostic results  - Monitor all insertion sites, i.e. indwelling lines, tubes, and drains  - Monitor endotracheal if appropriate and nasal secretions for changes in amount and color  - [place] appropriate cooling/warming therapies per order  - Administer medications as ordered  - Instruct and encourage patient and family to use good hand hygiene technique  - Identify and instruct in appropriate isolation precautions for identified infection/condition  Outcome: Progressing

## 2024-10-12 ENCOUNTER — HOSPITAL ENCOUNTER (EMERGENCY)
Facility: HOSPITAL | Age: 34
Discharge: HOME | End: 2024-10-12
Attending: EMERGENCY MEDICINE | Admitting: EMERGENCY MEDICINE
Payer: COMMERCIAL

## 2024-10-12 VITALS
RESPIRATION RATE: 17 BRPM | HEIGHT: 67 IN | TEMPERATURE: 97.9 F | DIASTOLIC BLOOD PRESSURE: 79 MMHG | WEIGHT: 150 LBS | SYSTOLIC BLOOD PRESSURE: 118 MMHG | HEART RATE: 64 BPM | BODY MASS INDEX: 23.54 KG/M2 | OXYGEN SATURATION: 99 %

## 2024-10-12 DIAGNOSIS — S09.90XA CLOSED HEAD INJURY, INITIAL ENCOUNTER: Primary | ICD-10-CM

## 2024-10-12 PROCEDURE — 99283 EMERGENCY DEPT VISIT LOW MDM: CPT

## 2024-10-12 PROCEDURE — 99283 EMERGENCY DEPT VISIT LOW MDM: CPT | Performed by: EMERGENCY MEDICINE

## 2024-10-12 NOTE — ED PROVIDER NOTES
Time reflects when diagnosis was documented in both MDM as applicable and the Disposition within this note       Time User Action Codes Description Comment    10/12/2024  7:32 PM DanisTejinder Add [S09.90XA] Closed head injury, initial encounter           ED Disposition       ED Disposition   Discharge    Condition   Stable    Date/Time   Sat Oct 12, 2024  7:32 PM    Comment   Ron Franco discharge to home/self care.                   Assessment & Plan       Medical Decision Making  Based on the history and medical screening exam performed the patient may be at risk for forehead abrasion, forehead contusion, closed head injury, concussion, skull fracture, intracranial hemorrhage.    Based on the work-up performed in the emergency room which includes physical examination, and which may include laboratory studies and imaging as warranted including advanced imaging such as CT scan or ultrasound, the diagnostic considerations are narrowed to exclude limb or life-threatening process.    The patient is stable for discharge.  Patient with normal neurologic examination in the emergency department had no vomiting or syncope.  Unlikely to have intracranial findings on CT.  This was discussed with patient and family who are agreeable to forego CT imaging at this time.  Outpatient follow-up information for comprehensive concussion program provided to patient.    Amount and/or Complexity of Data Reviewed  Labs:      Details: Not indicated  Radiology:      Details: Not indicated             Medications - No data to display    ED Risk Strat Scores                                               History of Present Illness       Chief Complaint   Patient presents with    Head Injury     Pt got dizzy and fell in his garage around 1745. Pt was dx Menière's Disease in 2020.Pt states he had an attack where he got dizzy and fell striking his head on the ground. Denies LOC but was near syncopal.Pt complete last round of chemo for  testicular cancer.        Past Medical History:   Diagnosis Date    Hypertension     resolved    Meniere's disease     left sided    PONV (postoperative nausea and vomiting)     Single kidney     left    Vertigo       Past Surgical History:   Procedure Laterality Date    HERNIA REPAIR      IR PORT PLACEMENT  2024    NASAL SINUS SURGERY Left 2020    NEPHRECTOMY Right     MD ORCHIECTOMY RADICAL TUMOR INGUINAL APPROACH Right 2024    Procedure: ORCHIECTOMY INGUINAL;  Surgeon: Nathan Hamm MD;  Location: [facility];  Service: Urology      Family History   Problem Relation Age of Onset    Hypertension Mother     Heart disease Father         cardiac pacemaker    Hypertension Father       Social History     Tobacco Use    Smoking status: Former     Current packs/day: 0.00     Types: Cigarettes     Quit date: 2018     Years since quittin.1     Passive exposure: Past    Smokeless tobacco: Never   Vaping Use    Vaping status: Never Used   Substance Use Topics    Alcohol use: Not Currently    Drug use: Yes     Frequency: 7.0 times per week     Types: Marijuana     Comment: vidhi 24      E-Cigarette/Vaping    E-Cigarette Use Never User       E-Cigarette/Vaping Substances    Nicotine No     THC No     CBD No     Flavoring No     Other No     Unknown No       I have reviewed and agree with the history as documented.     Patient with prior history of vertigo, history of testicular cancer, completed chemotherapy yesterday, states that today he was in his garage and had a vertigo attack.  He was sitting in the chair when he became dizzy/lightheaded, lost his balance and fell forward and struck his head.  He did not pass out.  He did not throw up.  He is not currently on any anticoagulation.  Currently he complains only of minimal forehead discomfort.  No other injuries or complaints.      History provided by:  Patient   used: No    Head Injury w/unknown LOC  Head/neck injury  location: Forehead.  Time since incident:  2 hours  Mechanism of injury: fall    Fall:     Fall occurred: From seated position in chair.    Impact surface:  Hard floor    Point of impact:  Head  Pain details:     Quality:  Aching    Severity:  Mild    Duration:  2 hours    Timing:  Constant    Progression:  Unchanged  Chronicity:  New  Relieved by:  Nothing  Worsened by:  Nothing  Ineffective treatments:  None tried  Associated symptoms: no blurred vision, no difficulty breathing, no disorientation, no double vision, no focal weakness, no headaches, no hearing loss, no loss of consciousness, no memory loss, no nausea, no neck pain, no numbness, no seizures and no vomiting        Review of Systems   Constitutional:  Negative for chills and fever.   HENT:  Negative for ear pain, hearing loss, sore throat, trouble swallowing and voice change.    Eyes:  Negative for blurred vision, double vision, pain and discharge.   Respiratory:  Negative for cough, shortness of breath and wheezing.    Cardiovascular:  Negative for chest pain and palpitations.   Gastrointestinal:  Negative for abdominal pain, blood in stool, constipation, diarrhea, nausea and vomiting.   Genitourinary:  Negative for dysuria, flank pain, frequency and hematuria.   Musculoskeletal:  Negative for joint swelling, neck pain and neck stiffness.   Skin:  Negative for rash and wound.   Neurological:  Negative for dizziness, focal weakness, seizures, loss of consciousness, syncope, facial asymmetry, numbness and headaches.   Psychiatric/Behavioral:  Negative for hallucinations, memory loss, self-injury and suicidal ideas.    All other systems reviewed and are negative.          Objective       ED Triage Vitals [10/12/24 1921]   Temperature Pulse Blood Pressure Respirations SpO2 Patient Position - Orthostatic VS   97.9 °F (36.6 °C) 64 118/79 17 99 % Sitting      Temp Source Heart Rate Source BP Location FiO2 (%) Pain Score    Temporal Monitor Left arm -- 5       Vitals      Date and Time Temp Pulse SpO2 Resp BP Pain Score FACES Pain Rating User   10/12/24 1921 97.9 °F (36.6 °C) 64 99 % 17 118/79 5 -- RG            Physical Exam  Vitals and nursing note reviewed.   Constitutional:       General: He is not in acute distress.     Appearance: He is well-developed.   HENT:      Head: Normocephalic.      Comments: Small abrasion left forehead.  No lacerations.  No indications for sutures.     Right Ear: External ear normal.      Left Ear: External ear normal.   Eyes:      General: No scleral icterus.        Right eye: No discharge.         Left eye: No discharge.      Extraocular Movements: Extraocular movements intact.      Conjunctiva/sclera: Conjunctivae normal.   Cardiovascular:      Rate and Rhythm: Normal rate and regular rhythm.      Heart sounds: Normal heart sounds. No murmur heard.  Pulmonary:      Effort: Pulmonary effort is normal.      Breath sounds: Normal breath sounds. No wheezing or rales.   Abdominal:      General: Bowel sounds are normal. There is no distension.      Palpations: Abdomen is soft.      Tenderness: There is no abdominal tenderness. There is no guarding or rebound.   Musculoskeletal:         General: No deformity. Normal range of motion.      Cervical back: Normal range of motion and neck supple. No rigidity or tenderness.   Skin:     General: Skin is warm and dry.      Findings: No rash.   Neurological:      General: No focal deficit present.      Mental Status: He is alert and oriented to person, place, and time.      Cranial Nerves: No cranial nerve deficit.      Sensory: No sensory deficit.      Motor: No weakness.      Coordination: Coordination normal.      Gait: Gait normal.   Psychiatric:         Mood and Affect: Mood normal.         Behavior: Behavior normal.         Thought Content: Thought content normal.         Judgment: Judgment normal.         Results Reviewed       None            No orders to display       Procedures    ED  Medication and Procedure Management   Prior to Admission Medications   Prescriptions Last Dose Informant Patient Reported? Taking?   Apple Cider Vinegar 188 MG CAPS  Self, Spouse/Significant Other Yes No   Sig: Take by mouth   Cholecalciferol (Vitamin D3) 125 MCG (5000 UT) TABS  Self, Spouse/Significant Other Yes No   Sig: Take 5,000 Units by mouth daily   Omega-3 Fatty Acids (Fish Oil) 300 MG CAPS  Self, Spouse/Significant Other Yes No   Sig: Take by mouth   Pyridoxine HCl (VITAMIN B6 PO)  Self, Spouse/Significant Other Yes No   Sig: Take by mouth   Turmeric (QC TUMERIC COMPLEX PO)  Self, Spouse/Significant Other Yes No   Sig: Take by mouth   magnesium 30 MG tablet  Self, Spouse/Significant Other Yes No   Sig: Take 30 mg by mouth 2 (two) times a day   ondansetron (ZOFRAN-ODT) 8 mg disintegrating tablet   No No   Sig: Take 1 tablet (8 mg total) by mouth every 8 (eight) hours as needed for nausea or vomiting   prochlorperazine (COMPAZINE) 10 mg tablet  Self, Spouse/Significant Other No No   Sig: Take 1 tablet (10 mg total) by mouth every 6 (six) hours as needed for nausea or vomiting      Facility-Administered Medications: None     Patient's Medications   Discharge Prescriptions    No medications on file       ED SEPSIS DOCUMENTATION   Time reflects when diagnosis was documented in both MDM as applicable and the Disposition within this note       Time User Action Codes Description Comment    10/12/2024  7:32 PM Tejinder Moscoso Add [S09.90XA] Closed head injury, initial encounter                  Tejinder Moscoso MD  10/12/24 1940

## 2024-10-14 ENCOUNTER — HOSPITAL ENCOUNTER (OUTPATIENT)
Dept: INFUSION CENTER | Facility: CLINIC | Age: 34
Discharge: HOME | End: 2024-10-14
Payer: COMMERCIAL

## 2024-10-14 VITALS
HEART RATE: 102 BPM | TEMPERATURE: 98.2 F | SYSTOLIC BLOOD PRESSURE: 122 MMHG | DIASTOLIC BLOOD PRESSURE: 88 MMHG | RESPIRATION RATE: 18 BRPM

## 2024-10-14 DIAGNOSIS — C62.11 SEMINOMA OF DESCENDED RIGHT TESTIS (HCC): ICD-10-CM

## 2024-10-14 DIAGNOSIS — N18.31 STAGE 3A CHRONIC KIDNEY DISEASE (HCC): Primary | ICD-10-CM

## 2024-10-14 DIAGNOSIS — T45.1X5A CHEMOTHERAPY INDUCED NAUSEA AND VOMITING: ICD-10-CM

## 2024-10-14 DIAGNOSIS — R11.2 CHEMOTHERAPY INDUCED NAUSEA AND VOMITING: ICD-10-CM

## 2024-10-14 PROCEDURE — 96360 HYDRATION IV INFUSION INIT: CPT

## 2024-10-14 RX ADMIN — SODIUM CHLORIDE 1000 ML: 0.9 INJECTION, SOLUTION INTRAVENOUS at 11:35

## 2024-10-14 NOTE — PROGRESS NOTES
Patient tolerated his hydration well without adverse reaction. He is aware to return on 12/2/24 fro a port flush.

## 2024-10-16 ENCOUNTER — HOSPITAL ENCOUNTER (OUTPATIENT)
Dept: CT IMAGING | Facility: HOSPITAL | Age: 34
Discharge: HOME | End: 2024-10-16
Payer: COMMERCIAL

## 2024-10-16 DIAGNOSIS — C62.11 SEMINOMA OF DESCENDED RIGHT TESTIS (HCC): ICD-10-CM

## 2024-10-16 PROCEDURE — 74177 CT ABD & PELVIS W/CONTRAST: CPT

## 2024-10-16 PROCEDURE — 71260 CT THORAX DX C+: CPT

## 2024-10-16 RX ADMIN — IOHEXOL 50 ML: 350 INJECTION, SOLUTION INTRAVENOUS at 15:22

## 2024-10-17 ENCOUNTER — OFFICE VISIT (OUTPATIENT)
Dept: HEMATOLOGY ONCOLOGY | Facility: CLINIC | Age: 34
End: 2024-10-17
Payer: COMMERCIAL

## 2024-10-17 VITALS
DIASTOLIC BLOOD PRESSURE: 70 MMHG | HEART RATE: 96 BPM | TEMPERATURE: 98.1 F | BODY MASS INDEX: 22.6 KG/M2 | SYSTOLIC BLOOD PRESSURE: 126 MMHG | HEIGHT: 67 IN | OXYGEN SATURATION: 99 % | WEIGHT: 144 LBS | RESPIRATION RATE: 16 BRPM

## 2024-10-17 DIAGNOSIS — C62.11 SEMINOMA OF DESCENDED RIGHT TESTIS (HCC): Primary | ICD-10-CM

## 2024-10-17 PROCEDURE — 99215 OFFICE O/P EST HI 40 MIN: CPT | Performed by: INTERNAL MEDICINE

## 2024-10-17 NOTE — PROGRESS NOTES
Hematology/Oncology Outpatient Office Note    Date of Service: 10/17/2024    [facility] [place]  [address] [place] [id number]  [phone]    Reason for Consultation:   Chief Complaint   Patient presents with    Follow-up       Cancer Stage at diagnosis: IIIA    Referral Physician: No ref. provider found    Primary Care Physician:  No primary care provider on file.     Nickname: Ron    Spouse: Bernie    Has a young infant ( 2024)    Original ECO     Today's ECO    Goals and Barriers:  Current Goal: Minimize effects of disease burden, extend life.   Barriers to accomplishing this: None    Patient's Capacity to Self Care:  Patient is able to self care    ASSESSMENT & PLAN      Diagnosis ICD-10-CM Associated Orders   1. Seminoma of descended right testis (HCC)  C62.11 CT chest abdomen pelvis w contrast     Comprehensive metabolic panel     CBC and differential     AFP tumor marker     HCG, tumor marker     LD,Blood            This is a 34 y.o. 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 I reviewed relevant office notes, imaging reports and pathology result as well.  I told the patient that this is a case of curable disease and what this means. We discussed that the goal of anti-cancer therapy is to provide best quality of life, extend overall survival, and progression free survival as shown in clinical trials.   I explained the risks/benefits of the proposed cancer therapy: Cisplatin +  Etoposide and after discussion including understanding risks of possible life-threatening complications and therapy-related malignancy development, informed consent for blood products and treatment has been signed and obtained.  TNM/Staging At Diagnosis  Cancer Staging   Seminoma of descended right testis (HCC)  Staging form: Testis, AJCC 8th Edition  - Pathologic stage from 5/24/2024: Stage Unknown (pT1b, cN2c, cM1a, S0) - Signed by Shreyas Amezcua MD on 6/23/2024  Stage prefix: Initial diagnosis  Residual tumor (R): R0 - None  [id number] (underlying LN)  Disease Features/Tumor Markers/Genetics  Tumor Marker: AFP, LDH, HCT WNL  6/25/2024: HCG, AFP, LDH WNL  Notable Path Features:   5/24/2024: Right testicle and spermatic cord (radical inguinal orchiectomy):     - Seminoma (4.5 cm).      - No definitive lymph-vascular invasion.     - Tunica vaginalis negative for tumor.     - Surgical margin negative for tumor  Treatment: Cisplatin + Etoposide   Other Supportive care: Zofran, EMLA  Treatment Team Members  Surgeon: Dr. Hamm  Rad Onc  Palliative  Labs  Diagnostics  6/10/2024 CT CAP w/c: Multiple pulmonary nodules. No prior study for comparison. Based on current Fleischner Society 2017 Guidelines on incidental pulmonary nodule, patients with a known malignancy are at increased risk of metastasis and should receive initial three-month   follow-up chest CT. Mediastinal and bilateral hilar lymphadenopathy concerning for metastatic disease given the patient's history.  Bilateral hilar and mediastinal lymphadenopathy. Right hilar lymph node measures 2.9 x 2.1 cm. Subcarinal lymph node is 2.5 x 1.4 cm. Pretracheal lymph node is 2.2 x 1.1 cm. Left hilar lymph node is 1.3 x 1.1 cm.   10/16/2024 CT CAP w/c: CT  5 x 3 mm nodule in the left lower lobe (604-127) has not changed.  3 mm perifissural nodule in the left lower lobe (604-125 is not significantly changed.  Right hilar lymph node conglomeration previously measured  2.9 cm long axis now measuring 1.9 cm. Subcarinal node previously measured 2.5 cm long axis now measuring 1.6 cm. Precarinal node measures 1.7 cm long axis, previously measuring 2.2 cm. Numerous additional smaller nodes are less prominent than on prior exam.      Discussion of decision making    I personally reviewed the following lab results, the image studies, pathology, other specialty/physicians consult notes and recommendations, and outside medical records. I had a lengthy discussion with the patient and shared the work-up findings. We discussed the diagnosis and management plan as below. I spent 41 minutes reviewing the records (labs, clinician notes, outside records, medical history, ordering medicine/tests/procedures, monitoring of anti-neoplastic toxicities, interpreting the imaging/labs previously done) and coordination of care as well as direct time with the patient today, of which greater than 50% of the time was spent in counseling and coordination of care with the patient/family.      Plan/Labs  Treatment completed  F/u Dietitian   Keeping the port for now  Restaging CT due in 10 weeks and is ordered along with CMP, AFP/HCG/LDH       Follow Up: 3 months    All questions were answered to the patient's satisfaction during this encounter. The patient knows the contact information for our office and knows to reach out for any relevant concerns related to this encounter. They are to call for any temperature 100.4 or higher, new symptoms including but not restricted to shaking chills, decreased appetite, nausea, vomiting, diarrhea, increased fatigue, shortness of breath or chest pain, confusion, and not feeling the strength to come to the clinic. For all other listed problems and medical diagnosis in their chart - they are managed by PCP and/or other specialists, which the patient acknowledges. Thank you very much for your consultation and making us a part of this patient's care. We are continuing to follow  closely with you. Please do not hesitate to reach out to me with any additional questions or concerns.    Shreyas Amezcua MD  Hematology & Medical Oncology Staff Physician             Disclaimer: This document was prepared using [employer] Direct technology. If a word or phrase is confusing, or does not make sense, this is likely due to recognition error which was not discovered during this clinician's review. If you believe an error has occurred, please contact me through [facility] service line for kirill?cation.      ONCOLOGY HISTORY OF PRESENT ILLNESS        Oncology History   Seminoma of descended right testis (HCC)   5/24/2024 -  Cancer Staged    Staging form: Testis, AJCC 8th Edition  - Pathologic stage from 5/24/2024: Stage IIIA (pT1b, cM1a, S0) - Signed by Shreyas Amezcua MD on 6/26/2024  Stage prefix: Initial diagnosis  Residual tumor (R): R0 - None       6/11/2024 Initial Diagnosis    Seminoma of descended right testis (HCC)     7/15/2024 -  Chemotherapy    alteplase (CATHFLO), 2 mg, Intracatheter, Every 1 Minute as needed, 4 of 4 cycles  Administration: 2 mg (9/9/2024)  CISplatin (PLATINOL) infusion, 20 mg/m2 = 38.2 mg, Intravenous, Once, 4 of 4 cycles  Administration: 38.2 mg (7/15/2024), 38.2 mg (7/16/2024), 38.2 mg (7/17/2024), 38.2 mg (7/18/2024), 38.2 mg (7/19/2024), 38.2 mg (8/12/2024), 38.2 mg (8/13/2024), 38.2 mg (8/14/2024), 38.2 mg (8/15/2024), 38.2 mg (8/16/2024), 38.2 mg (9/9/2024), 38.2 mg (9/10/2024), 38.2 mg (9/11/2024), 38.2 mg (9/12/2024), 38.2 mg (9/13/2024), 38.2 mg (10/7/2024), 38.2 mg (10/8/2024), 38.2 mg (10/9/2024), 38.2 mg (10/10/2024), 38.2 mg (10/11/2024)  sodium chloride, 500 mL, Intravenous, Once, 4 of 4 cycles  Administration: 500 mL (7/15/2024), 500 mL (7/15/2024), 500 mL (7/16/2024), 500 mL (7/16/2024), 500 mL (7/17/2024), 500 mL (7/17/2024), 500 mL (7/18/2024), 500 mL (7/18/2024), 500 mL (7/19/2024), 500 mL (7/19/2024), 500 mL (8/12/2024), 500 mL (8/12/2024), 500 mL  (8/13/2024), 500 mL (8/13/2024), 500 mL (8/14/2024), 500 mL (8/14/2024), 500 mL (8/15/2024), 500 mL (8/15/2024), 500 mL (8/16/2024), 500 mL (8/16/2024), 500 mL (9/10/2024), 500 mL (9/10/2024), 500 mL (9/11/2024), 500 mL (9/11/2024), 500 mL (9/12/2024), 500 mL (9/12/2024), 500 mL (9/13/2024), 500 mL (9/13/2024), 500 mL (10/8/2024), 500 mL (10/8/2024), 500 mL (10/9/2024), 500 mL (10/9/2024), 500 mL (10/10/2024), 500 mL (10/10/2024), 500 mL (10/11/2024), 500 mL (10/11/2024)  fosaprepitant (EMEND) IVPB, 150 mg, Intravenous, Once, 4 of 4 cycles  Administration: 150 mg (7/15/2024), 150 mg (8/12/2024), 150 mg (9/9/2024), 150 mg (10/7/2024)  etoposide (TOPOSAR), 100 mg/m2 = 191 mg, Intravenous, Once, 4 of 4 cycles  Administration: 191 mg (7/15/2024), 191 mg (7/16/2024), 191 mg (7/17/2024), 191 mg (7/18/2024), 191 mg (7/19/2024), 191 mg (8/12/2024), 191 mg (8/13/2024), 191 mg (8/14/2024), 191 mg (8/15/2024), 191 mg (8/16/2024), 191 mg (9/9/2024), 191 mg (9/10/2024), 191 mg (9/11/2024), 191 mg (9/12/2024), 191 mg (9/13/2024), 191 mg (10/7/2024), 191 mg (10/8/2024), 191 mg (10/9/2024), 191 mg (10/10/2024), 191 mg (10/11/2024)           SUBJECTIVE  (INTERVAL HISTORY)      Clotting History None   Bleeding History None   Cancer History R Testicular   Family Cancer History None   H/O Blood/Plt Transfusion None   Tobacco/etoh/drug abuse 1 PPD x 3 years, quit at age 26, no etoh abuse, does recreational marijuana           Occupation Used to be a  at a Car dealership, 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    Improving energy and appetite. Had vertigo episode a week ago.    Pain: none     No issues or concerns from his part.    I have reviewed the relevant past medical, surgical, social and family history. I have also reviewed allergies and medications for this patient.    Review of Systems  Baseline weight: 165-175 lbs    Denies F/C, N/V, SOB, CP, HA, rash, itching,  gen weakness, melena, hematuria, hematochezia, falls, diarrhea, or constipation       A 10-point review of system was performed, pertinent positive and negative were detailed as above. Otherwise, the 10-point review of system was negative.      Past Medical History:   Diagnosis Date    Hypertension     resolved    Meniere's disease     left sided    PONV (postoperative nausea and vomiting)     Single kidney     left    Vertigo        Past Surgical History:   Procedure Laterality Date    HERNIA REPAIR      IR PORT PLACEMENT  2024    NASAL SINUS SURGERY Left 2020    NEPHRECTOMY Right     CT ORCHIECTOMY RADICAL TUMOR INGUINAL APPROACH Right 2024    Procedure: ORCHIECTOMY INGUINAL;  Surgeon: Nathan Hamm MD;  Location: [facility];  Service: Urology       Family History   Problem Relation Age of Onset    Hypertension Mother     Heart disease Father         cardiac pacemaker    Hypertension Father        Social History     Socioeconomic History    Marital status: /Civil Union     Spouse name: Not on file    Number of children: Not on file    Years of education: Not on file    Highest education level: Not on file   Occupational History    Not on file   Tobacco Use    Smoking status: Former     Current packs/day: 0.00     Types: Cigarettes     Quit date: 2018     Years since quittin.1     Passive exposure: Past    Smokeless tobacco: Never   Vaping Use    Vaping status: Never Used   Substance and Sexual Activity    Alcohol use: Not Currently    Drug use: Yes     Frequency: 7.0 times per week     Types: Marijuana     Comment: vidhi 24    Sexual activity: Not on file   Other Topics Concern    Not on file   Social History Narrative    Denied: Always uses seat belt    Caffeine use: one 16 oz cups of coffee, 1 cup of soda    Does not exercise     Social Determinants of Health     Financial Resource Strain: Not on file   Food Insecurity: Not on file   Transportation Needs: Not on file    Physical Activity: Not on file   Stress: Not on file   Social Connections: Not on file   Intimate Partner Violence: Not on file   Housing Stability: Not on file       No Known Allergies    Current Outpatient Medications   Medication Sig Dispense Refill    Apple Cider Vinegar 188 MG CAPS Take by mouth      Cholecalciferol (Vitamin D3) 125 MCG (5000 UT) TABS Take 5,000 Units by mouth daily      magnesium 30 MG tablet Take 30 mg by mouth 2 (two) times a day      Omega-3 Fatty Acids (Fish Oil) 300 MG CAPS Take by mouth      ondansetron (ZOFRAN-ODT) 8 mg disintegrating tablet Take 1 tablet (8 mg total) by mouth every 8 (eight) hours as needed for nausea or vomiting 30 tablet 3    prochlorperazine (COMPAZINE) 10 mg tablet Take 1 tablet (10 mg total) by mouth every 6 (six) hours as needed for nausea or vomiting 30 tablet 1    Pyridoxine HCl (VITAMIN B6 PO) Take by mouth      Turmeric (QC TUMERIC COMPLEX PO) Take by mouth       No current facility-administered medications for this visit.       (Not in a hospital admission)      Objective:     24 Hour Vitals Assessment:     Vitals:    10/17/24 1110   BP: 126/70   Pulse: 96   Resp: 16   Temp: 98.1 °F (36.7 °C)   SpO2: 99%         PHYSICIAN EXAM:    General: Appearance: alert, cooperative, no distress.  HEENT: Normocephalic, atraumatic. No scleral icterus. conjunctivae clear. EOMI.  Chest: No tenderness to palpation. No open wound noted.  Lungs: Clear to auscultation bilaterally, Respirations unlabored.  Cardiac: Regular rate and rhythm, +S1and S2, -r/m/g  Abdomen: Soft, non-tender, non-distended. Bowel sounds are normal.  Extremities:  No edema, cyanosis, clubbing.  Skin: Skin color, turgor are normal. No rashes.  Lymphatics: no palpable supra-cervical, axillary, or inguinal adenopathy  Neurologic: Awake, Alert, and oriented, no gross focal deficits noted b/l.       DATA REVIEW:    Pathology Result:    Final Diagnosis   Date Value Ref Range Status   05/24/2024   Final    A.   Right testicle and spermatic cord (radical inguinal orchiectomy):     - Seminoma (4.5 cm).      - No definitive lymph-vascular invasion.     - Tunica vaginalis negative for tumor.     - Surgical margin negative for tumor.          Image Results:   Image result are reviewed and documented in Hematology/Oncology history    CT chest abdomen pelvis w contrast  Narrative: CT CHEST, ABDOMEN AND PELVIS WITH IV CONTRAST    INDICATION: C62.11: Malignant neoplasm of descended right testis.    COMPARISON: 6/10/2024    TECHNIQUE: CT examination of the chest, abdomen and pelvis was performed. Multiplanar 2D reformatted images were created from the source data.    This examination, like all CT scans performed in the [facility], was performed utilizing techniques to minimize radiation dose exposure, including the use of iterative reconstruction and automated exposure control. Radiation dose length   product (DLP) for this visit: 584 mGy-cm    IV Contrast: 50 mL of iohexol (OMNIPAQUE)  Enteric Contrast: Not administered.    FINDINGS:    CHEST    LUNGS: Similar lung volumes.  5 x 3 mm nodule in the left lower lobe (604-127) has not changed.  3 mm perifissural nodule in the left lower lobe (604-125 is not significantly changed.  Unchanged right lower lobe calcified granuloma.  Unchanged tiny focus of atelectasis/scarring in the lingula..  No new or enlarging pulmonary nodules. No suspicious airway filling defects.    PLEURA: Unremarkable.    HEART/GREAT VESSELS: New small pericardial effusion. Anteriorly this measures 5 mm thick. No pericardial nodules or abnormal enhancement. No thoracic aortic aneurysm.    MEDIASTINUM AND NOEL: Mediastinal and hilar adenopathy is less prominent than on prior exam. Right hilar lymph node conglomeration previously measured 2.9 cm long axis now measuring 1.9 cm. Subcarinal node previously measured 2.5 cm long axis now   measuring 1.6 cm. Precarinal node measures 1.7 cm long axis,  previously measuring 2.2 cm. Numerous additional smaller nodes are less prominent than on prior exam.    CHEST WALL AND LOWER NECK: Interval placement of right chest wall Mediport catheter with tip in the superior atriocaval junction. No axillary adenopathy. Minimal gynecomastia.    ABDOMEN    LIVER/BILIARY TREE: Unremarkable.    GALLBLADDER: Calcified gallstone in the gallbladder lumen. No pericholecystic inflammatory changes.    SPLEEN: Unremarkable.    PANCREAS: Unremarkable.    ADRENAL GLANDS: Unremarkable.    KIDNEYS/URETERS: Absent right kidney. Normal left kidney. Normal left ureter.    STOMACH AND BOWEL: Unremarkable.    APPENDIX: No findings to suggest appendicitis.    ABDOMINOPELVIC CAVITY: No ascites. No pneumoperitoneum. No lymphadenopathy.    VESSELS: Unremarkable for patient's age.    PELVIS    REPRODUCTIVE ORGANS: Right orchiectomy.    URINARY BLADDER: Unremarkable.    ABDOMINAL WALL/INGUINAL REGIONS: Unremarkable.    BONES: No acute fracture or suspicious osseous lesion.  Impression: Partial response. Recommend follow-up per oncology.    Interval placement of right chest wall Mediport catheter.    New small pericardial effusion. Correlate clinically.    Mediastinal and hilar adenopathy has improved from prior exam. Pulmonary nodules show no interval change.    The study was marked in EPIC for significant notification.    Workstation performed: [id number]      LABS:  Lab data are reviewed and documented in HemOnc history.       Lab Results   Component Value Date    HGB 9.9 (L) 10/05/2024    HCT 29.9 (L) 10/05/2024    MCV 94 10/05/2024     10/05/2024    WBC 4.96 10/05/2024    NRBC 0 10/05/2024     Lab Results   Component Value Date     12/16/2017    K 4.0 10/05/2024     10/05/2024    CO2 27 10/05/2024    BUN 10 10/05/2024    CREATININE 1.13 10/05/2024    GLUF 87 10/05/2024    CALCIUM 9.1 10/05/2024    AST 28 10/05/2024    ALT 32 10/05/2024    ALKPHOS 48 10/05/2024    PROT 7.3  "12/16/2017    BILITOT 1.0 12/16/2017    EGFR 83 10/05/2024       No results found for: \"IRON\", \"TIBC\", \"FERRITIN\"    Lab Results   Component Value Date    [id number] 400 02/14/2022       No results for input(s): \"WBC\", \"CREAT\", \"PLT\" in the last 72 hours.      By:  Shreyas Amezcua MD, 10/17/2024, 11:18 AM                                  "

## 2024-12-02 ENCOUNTER — HOSPITAL ENCOUNTER (OUTPATIENT)
Dept: INFUSION CENTER | Facility: CLINIC | Age: 34
Discharge: HOME | End: 2024-12-02

## 2024-12-02 NOTE — PLAN OF CARE
Problem: INFECTION - ADULT  Goal: Absence or prevention of progression during hospitalization  Description: INTERVENTIONS:  - Assess and monitor for signs and symptoms of infection  - Monitor lab/diagnostic results  - Monitor all insertion sites, i.e. indwelling lines, tubes, and drains  - Monitor endotracheal if appropriate and nasal secretions for changes in amount and color  - Dalton appropriate cooling/warming therapies per order  - Administer medications as ordered  - Instruct and encourage patient and family to use good hand hygiene technique  - Identify and instruct in appropriate isolation precautions for identified infection/condition  Outcome: Progressing

## 2024-12-02 NOTE — PROGRESS NOTES
Patient tolerated port flush and central labs well without adverse reaction. Pt is aware to return on 12/27/24.

## 2024-12-27 ENCOUNTER — HOSPITAL ENCOUNTER (OUTPATIENT)
Dept: INFUSION CENTER | Facility: CLINIC | Age: 34
End: 2024-12-27
Payer: COMMERCIAL

## 2024-12-27 DIAGNOSIS — C62.11 SEMINOMA OF DESCENDED RIGHT TESTIS (HCC): Primary | ICD-10-CM

## 2024-12-27 LAB
AFP-TM SERPL-MCNC: 2.63 NG/ML (ref 0–9)
ALBUMIN SERPL BCG-MCNC: 4 G/DL (ref 3.5–5)
ALP SERPL-CCNC: 45 U/L (ref 34–104)
ALT SERPL W P-5'-P-CCNC: 58 U/L (ref 7–52)
ANION GAP SERPL CALCULATED.3IONS-SCNC: 5 MMOL/L (ref 4–13)
AST SERPL W P-5'-P-CCNC: 36 U/L (ref 13–39)
BASOPHILS # BLD AUTO: 0.01 THOUSANDS/ÂΜL (ref 0–0.1)
BASOPHILS NFR BLD AUTO: 0 % (ref 0–1)
BILIRUB SERPL-MCNC: 0.46 MG/DL (ref 0.2–1)
BUN SERPL-MCNC: 18 MG/DL (ref 5–25)
CALCIUM SERPL-MCNC: 9.6 MG/DL (ref 8.4–10.2)
CHLORIDE SERPL-SCNC: 101 MMOL/L (ref 96–108)
CO2 SERPL-SCNC: 31 MMOL/L (ref 21–32)
CREAT SERPL-MCNC: 1.38 MG/DL (ref 0.6–1.3)
EOSINOPHIL # BLD AUTO: 0.15 THOUSAND/ÂΜL (ref 0–0.61)
EOSINOPHIL NFR BLD AUTO: 4 % (ref 0–6)
ERYTHROCYTE [DISTWIDTH] IN BLOOD BY AUTOMATED COUNT: 11.8 % (ref 11.6–15.1)
GFR SERPL CREATININE-BSD FRML MDRD: 65 ML/MIN/1.73SQ M
GLUCOSE SERPL-MCNC: 119 MG/DL (ref 65–140)
HCG-TM SERPL-SCNC: <0.6 MLU/ML
HCT VFR BLD AUTO: 40.6 % (ref 36.5–49.3)
HGB BLD-MCNC: 13.7 G/DL (ref 12–17)
IMM GRANULOCYTES # BLD AUTO: 0.01 THOUSAND/UL (ref 0–0.2)
IMM GRANULOCYTES NFR BLD AUTO: 0 % (ref 0–2)
LDH SERPL-CCNC: 175 U/L (ref 140–271)
LYMPHOCYTES # BLD AUTO: 1.5 THOUSANDS/ÂΜL (ref 0.6–4.47)
LYMPHOCYTES NFR BLD AUTO: 35 % (ref 14–44)
MCH RBC QN AUTO: 29.9 PG (ref 26.8–34.3)
MCHC RBC AUTO-ENTMCNC: 33.7 G/DL (ref 31.4–37.4)
MCV RBC AUTO: 89 FL (ref 82–98)
MONOCYTES # BLD AUTO: 0.36 THOUSAND/ÂΜL (ref 0.17–1.22)
MONOCYTES NFR BLD AUTO: 8 % (ref 4–12)
NEUTROPHILS # BLD AUTO: 2.27 THOUSANDS/ÂΜL (ref 1.85–7.62)
NEUTS SEG NFR BLD AUTO: 53 % (ref 43–75)
NRBC BLD AUTO-RTO: 0 /100 WBCS
PLATELET # BLD AUTO: 128 THOUSANDS/UL (ref 149–390)
PMV BLD AUTO: 10.9 FL (ref 8.9–12.7)
POTASSIUM SERPL-SCNC: 3.6 MMOL/L (ref 3.5–5.3)
PROT SERPL-MCNC: 6.5 G/DL (ref 6.4–8.4)
RBC # BLD AUTO: 4.58 MILLION/UL (ref 3.88–5.62)
SODIUM SERPL-SCNC: 137 MMOL/L (ref 135–147)
WBC # BLD AUTO: 4.3 THOUSAND/UL (ref 4.31–10.16)

## 2024-12-27 PROCEDURE — 85025 COMPLETE CBC W/AUTO DIFF WBC: CPT

## 2024-12-27 PROCEDURE — 80053 COMPREHEN METABOLIC PANEL: CPT

## 2024-12-27 PROCEDURE — 83615 LACTATE (LD) (LDH) ENZYME: CPT

## 2024-12-27 PROCEDURE — 82105 ALPHA-FETOPROTEIN SERUM: CPT

## 2024-12-27 PROCEDURE — 84702 CHORIONIC GONADOTROPIN TEST: CPT

## 2024-12-29 NOTE — PROGRESS NOTES
Hematology/Oncology Outpatient Office Note    Date of Service: 2025    [facility]  [address] [place] [place] [id number]  [phone]    Reason for Consultation:   Chief Complaint   Patient presents with    Follow-up       Cancer Stage at diagnosis: IIIA    Referral Physician: No ref. provider found    Primary Care Physician:  No primary care provider on file.     Nickname: Ron    Spouse: Bernie    Has a young infant ( 2024)    Original ECO     Today's ECO    Goals and Barriers:  Current Goal: Minimize effects of disease burden, extend life.   Barriers to accomplishing this: None    Patient's Capacity to Self Care:  Patient is able to self care    ASSESSMENT & PLAN      Diagnosis ICD-10-CM Associated Orders   1. Seminoma of descended right testis (HCC)  C62.11             This is a 34 y.o. 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 I reviewed relevant office notes, imaging reports and pathology result as well.  I told the patient that this is a case of curable disease and what this means. We discussed that the goal of anti-cancer therapy is to provide best quality of life, extend overall survival, and progression free survival as shown in clinical trials.   I explained the risks/benefits of the proposed cancer therapy: Cisplatin + Etoposide and after discussion including understanding risks of possible life-threatening complications and therapy-related malignancy development,  informed consent for blood products and treatment has been signed and obtained.  TNM/Staging At Diagnosis  Cancer Staging   Seminoma of descended right testis (HCC)  Staging form: Testis, AJCC 8th Edition  - Pathologic stage from 5/24/2024: Stage Unknown (pT1b, cN2c, cM1a, S0) - Signed by Shreyas Amezcua MD on 6/23/2024  Stage prefix: Initial diagnosis  Residual tumor (R): R0 - None  [id number] (underlying LN)  Disease Features/Tumor Markers/Genetics  Tumor Marker: AFP, LDH, HCT WNL  6/25/2024: HCG, AFP, LDH WNL  Notable Path Features:   5/24/2024: Right testicle and spermatic cord (radical inguinal orchiectomy):     - Seminoma (4.5 cm).      - No definitive lymph-vascular invasion.     - Tunica vaginalis negative for tumor.     - Surgical margin negative for tumor  Treatment: Cisplatin + Etoposide   Other Supportive care: Zofran, EMLA  Treatment Team Members  Surgeon: Dr. Hamm  Rad Onc  Palliative  Labs  Diagnostics  6/10/2024 CT CAP w/c: Multiple pulmonary nodules. No prior study for comparison. Based on current Fleischner Society 2017 Guidelines on incidental pulmonary nodule, patients with a known malignancy are at increased risk of metastasis and should receive initial three-month   follow-up chest CT. Mediastinal and bilateral hilar lymphadenopathy concerning for metastatic disease given the patient's history.  Bilateral hilar and mediastinal lymphadenopathy. Right hilar lymph node measures 2.9 x 2.1 cm. Subcarinal lymph node is 2.5 x 1.4 cm. Pretracheal lymph node is 2.2 x 1.1 cm. Left hilar lymph node is 1.3 x 1.1 cm.   10/16/2024 CT CAP w/c: SC  5 x 3 mm nodule in the left lower lobe (604-127) has not changed.  3 mm perifissural nodule in the left lower lobe (604-125 is not significantly changed.  Right hilar lymph node conglomeration previously measured 2.9 cm long axis now measuring 1.9 cm. Subcarinal node previously measured 2.5 cm long axis now measuring 1.6 cm. Precarinal node measures 1.7 cm  long axis, previously measuring 2.2 cm. Numerous additional smaller nodes are less prominent than on prior exam.  12/30/2024 CT CAP w/c: Essentially stable mediastinal and hilar lymphadenopathy. Stable small lung nodules  stable enlarged right hilar lymph node measuring 1.5 cm in short axis. Additional representative lymph node includes a mildly   enlarged subcarinal lymph node measuring 1.1 cm in short axis, previously approximately 1.0 cm. Left hilar lymph node on image 64 of series 2 measures 7 mm in short axis without significant interval change.    Discussion of decision making    I personally reviewed the following lab results, the image studies, pathology, other specialty/physicians consult notes and recommendations, and outside medical records. I had a lengthy discussion with the patient and shared the work-up findings. We discussed the diagnosis and management plan as below. I spent 31 minutes reviewing the records (labs, clinician notes, outside records, medical history, ordering medicine/tests/procedures, monitoring of anti-neoplastic toxicities, interpreting the imaging/labs previously done) and coordination of care as well as direct time with the patient today, of which greater than 50% of the time was spent in counseling and coordination of care with the patient/family.      Plan/Labs  Treatment completed  F/u Dietitian prn  Keeping the port for now  Restaging CT due in 16 weeks and is ordered along with CMP, AFP/HCG/LDH, space out to 6 months if good next visit       Follow Up: 4 months    All questions were answered to the patient's satisfaction during this encounter. The patient knows the contact information for our office and knows to reach out for any relevant concerns related to this encounter. They are to call for any temperature 100.4 or higher, new symptoms including but not restricted to shaking chills, decreased appetite, nausea, vomiting, diarrhea, increased fatigue, shortness of breath or  chest pain, confusion, and not feeling the strength to come to the clinic. For all other listed problems and medical diagnosis in their chart - they are managed by PCP and/or other specialists, which the patient acknowledges. Thank you very much for your consultation and making us a part of this patient's care. We are continuing to follow closely with you. Please do not hesitate to reach out to me with any additional questions or concerns.    Shreyas Amezcua MD  Hematology & Medical Oncology Staff Physician             Disclaimer: This document was prepared using E[employer] Direct technology. If a word or phrase is confusing, or does not make sense, this is likely due to recognition error which was not discovered during this clinician's review. If you believe an error has occurred, please contact me through [employer] service line for kirill?cation.      ONCOLOGY HISTORY OF PRESENT ILLNESS        Oncology History   Seminoma of descended right testis (HCC)   5/24/2024 -  Cancer Staged    Staging form: Testis, AJCC 8th Edition  - Pathologic stage from 5/24/2024: Stage IIIA (pT1b, cM1a, S0) - Signed by Shreyas Amezcua MD on 6/26/2024  Stage prefix: Initial diagnosis  Residual tumor (R): R0 - None       6/11/2024 Initial Diagnosis    Seminoma of descended right testis (HCC)     7/15/2024 - 10/11/2024 Chemotherapy    alteplase (CATHFLO), 2 mg, Intracatheter, Every 1 Minute as needed, 4 of 4 cycles  Administration: 2 mg (9/9/2024)  CISplatin (PLATINOL) infusion, 20 mg/m2 = 38.2 mg, Intravenous, Once, 4 of 4 cycles  Administration: 38.2 mg (7/15/2024), 38.2 mg (7/16/2024), 38.2 mg (7/17/2024), 38.2 mg (7/18/2024), 38.2 mg (7/19/2024), 38.2 mg (8/12/2024), 38.2 mg (8/13/2024), 38.2 mg (8/14/2024), 38.2 mg (8/15/2024), 38.2 mg (8/16/2024), 38.2 mg (9/9/2024), 38.2 mg (9/10/2024), 38.2 mg (9/11/2024), 38.2 mg (9/12/2024), 38.2 mg (9/13/2024), 38.2 mg (10/7/2024), 38.2 mg (10/8/2024), 38.2 mg (10/9/2024), 38.2 mg  (10/10/2024), 38.2 mg (10/11/2024)  sodium chloride, 500 mL, Intravenous, Once, 4 of 4 cycles  Administration: 500 mL (7/15/2024), 500 mL (7/15/2024), 500 mL (7/16/2024), 500 mL (7/16/2024), 500 mL (7/17/2024), 500 mL (7/17/2024), 500 mL (7/18/2024), 500 mL (7/18/2024), 500 mL (7/19/2024), 500 mL (7/19/2024), 500 mL (8/12/2024), 500 mL (8/12/2024), 500 mL (8/13/2024), 500 mL (8/13/2024), 500 mL (8/14/2024), 500 mL (8/14/2024), 500 mL (8/15/2024), 500 mL (8/15/2024), 500 mL (8/16/2024), 500 mL (8/16/2024), 500 mL (9/10/2024), 500 mL (9/10/2024), 500 mL (9/11/2024), 500 mL (9/11/2024), 500 mL (9/12/2024), 500 mL (9/12/2024), 500 mL (9/13/2024), 500 mL (9/13/2024), 500 mL (10/8/2024), 500 mL (10/8/2024), 500 mL (10/9/2024), 500 mL (10/9/2024), 500 mL (10/10/2024), 500 mL (10/10/2024), 500 mL (10/11/2024), 500 mL (10/11/2024)  fosaprepitant (EMEND) IVPB, 150 mg, Intravenous, Once, 4 of 4 cycles  Administration: 150 mg (7/15/2024), 150 mg (8/12/2024), 150 mg (9/9/2024), 150 mg (10/7/2024)  etoposide (TOPOSAR), 100 mg/m2 = 191 mg, Intravenous, Once, 4 of 4 cycles  Administration: 191 mg (7/15/2024), 191 mg (7/16/2024), 191 mg (7/17/2024), 191 mg (7/18/2024), 191 mg (7/19/2024), 191 mg (8/12/2024), 191 mg (8/13/2024), 191 mg (8/14/2024), 191 mg (8/15/2024), 191 mg (8/16/2024), 191 mg (9/9/2024), 191 mg (9/10/2024), 191 mg (9/11/2024), 191 mg (9/12/2024), 191 mg (9/13/2024), 191 mg (10/7/2024), 191 mg (10/8/2024), 191 mg (10/9/2024), 191 mg (10/10/2024), 191 mg (10/11/2024)           SUBJECTIVE  (INTERVAL HISTORY)      Clotting History None   Bleeding History None   Cancer History R Testicular   Family Cancer History None   H/O Blood/Plt Transfusion None   Tobacco/etoh/drug abuse 1 PPD x 3 years, quit at age 26, no etoh abuse, does recreational marijuana           Occupation Used to be a  at a Car dealership, 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    I have reviewed the relevant past medical, surgical, social and family history. I have also reviewed allergies and medications for this patient.    Review of Systems  Baseline weight: 165-175 lbs    Denies F/C, N/V, SOB, CP, HA, rash, itching, gen weakness, melena, hematuria, hematochezia, falls, diarrhea, or constipation       A 10-point review of system was performed, pertinent positive and negative were detailed as above. Otherwise, the 10-point review of system was negative.      Past Medical History:   Diagnosis Date    Hypertension     resolved    Meniere's disease     left sided    PONV (postoperative nausea and vomiting)     Single kidney     left    Vertigo        Past Surgical History:   Procedure Laterality Date    HERNIA REPAIR      IR PORT PLACEMENT  2024    NASAL SINUS SURGERY Left 2020    NEPHRECTOMY Right     NV ORCHIECTOMY RADICAL TUMOR INGUINAL APPROACH Right 2024    Procedure: ORCHIECTOMY INGUINAL;  Surgeon: Nathan Hamm MD;  Location: [facility] OR;  Service: Urology       Family History   Problem Relation Age of Onset    Hypertension Mother     Heart disease Father         cardiac pacemaker    Hypertension Father        Social History     Socioeconomic History    Marital status: /Civil Union     Spouse name: Not on file    Number of children: Not on file    Years of education: Not on file    Highest education level: Not on file   Occupational History    Not on file   Tobacco Use    Smoking status: Former     Current packs/day: 0.00     Types: Cigarettes     Quit date: 2018     Years since quittin.3     Passive exposure: Past    Smokeless tobacco: Never   Vaping Use    Vaping status: Never Used   Substance and Sexual Activity    Alcohol use: Not Currently    Drug use: Yes     Frequency: 7.0 times per week     Types: Marijuana     Comment: vidhi 24    Sexual activity: Not on file   Other  Topics Concern    Not on file   Social History Narrative    Denied: Always uses seat belt    Caffeine use: one 16 oz cups of coffee, 1 cup of soda    Does not exercise     Social Drivers of Health     Financial Resource Strain: Not on file   Food Insecurity: Not on file   Transportation Needs: Not on file   Physical Activity: Not on file   Stress: Not on file   Social Connections: Not on file   Intimate Partner Violence: Not on file   Housing Stability: Not on file       No Known Allergies    Current Outpatient Medications   Medication Sig Dispense Refill    Apple Cider Vinegar 188 MG CAPS Take by mouth      Cholecalciferol (Vitamin D3) 125 MCG (5000 UT) TABS Take 5,000 Units by mouth daily      magnesium 30 MG tablet Take 30 mg by mouth 2 (two) times a day      Omega-3 Fatty Acids (Fish Oil) 300 MG CAPS Take by mouth      ondansetron (ZOFRAN-ODT) 8 mg disintegrating tablet Take 1 tablet (8 mg total) by mouth every 8 (eight) hours as needed for nausea or vomiting 30 tablet 3    prochlorperazine (COMPAZINE) 10 mg tablet Take 1 tablet (10 mg total) by mouth every 6 (six) hours as needed for nausea or vomiting 30 tablet 1    Pyridoxine HCl (VITAMIN B6 PO) Take by mouth      Turmeric (QC TUMERIC COMPLEX PO) Take by mouth       No current facility-administered medications for this visit.       (Not in a hospital admission)      Objective:     24 Hour Vitals Assessment:     Vitals:    01/08/25 1013   BP: 130/80   Pulse: 93   Resp: 18   Temp: 98.5 °F (36.9 °C)   SpO2: 99%           PHYSICIAN EXAM:    General: Appearance: alert, cooperative, no distress.  HEENT: Normocephalic, atraumatic. No scleral icterus. conjunctivae clear. EOMI.  Chest: No tenderness to palpation. No open wound noted.  Lungs: Clear to auscultation bilaterally, Respirations unlabored.  Cardiac: Regular rate and rhythm, +S1and S2, -r/m/g  Abdomen: Soft, non-tender, non-distended. Bowel sounds are normal.  Extremities:  No edema, cyanosis,  clubbing.  Skin: Skin color, turgor are normal. No rashes.  Lymphatics: no palpable supra-cervical, axillary, or inguinal adenopathy  Neurologic: Awake, Alert, and oriented, no gross focal deficits noted b/l.       DATA REVIEW:    Pathology Result:    Final Diagnosis   Date Value Ref Range Status   05/24/2024   Final    A.  Right testicle and spermatic cord (radical inguinal orchiectomy):     - Seminoma (4.5 cm).      - No definitive lymph-vascular invasion.     - Tunica vaginalis negative for tumor.     - Surgical margin negative for tumor.          Image Results:   Image result are reviewed and documented in Hematology/Oncology history    CT chest abdomen pelvis w contrast  Narrative: CT CHEST, ABDOMEN AND PELVIS WITH IV CONTRAST    INDICATION: C62.11: Malignant neoplasm of descended right testis.    COMPARISON: CT of chest, abdomen, and pelvis dated 10/16/2024    TECHNIQUE: CT examination of the chest, abdomen and pelvis was performed. Multiplanar 2D reformatted images were created from the source data.    This examination, like all CT scans performed in the [facility], was performed utilizing techniques to minimize radiation dose exposure, including the use of iterative reconstruction and automated exposure control. Radiation dose length   product (DLP) for this visit: 863 mGy-cm    IV Contrast: 100 mL of iohexol (OMNIPAQUE)  Enteric Contrast: Not administered.    FINDINGS:    CHEST    LUNGS: There is no tracheal or central endobronchial lesion. On image 123 of series 3 there is a stable 5 mm left lower lobe lung nodule. On image 119 of series 3 there is a stable 3 mm perifissural left lung nodule. Stable 2 mm right lower lobe lung   nodule on image 83 of series 602.    PLEURA: Unremarkable.    HEART/GREAT VESSELS: Heart is unremarkable for patient's age. No thoracic aortic aneurysm. Stable trace to small pericardial effusion.    MEDIASTINUM AND NOEL: Essentially stable mediastinal and hilar  lymphadenopathy; for example on image 57 of series 2 there is a stable enlarged right hilar lymph node measuring 1.5 cm in short axis. Additional representative lymph node includes a mildly   enlarged subcarinal lymph node measuring 1.1 cm in short axis, previously approximately 1.0 cm. Left hilar lymph node on image 64 of series 2 measures 7 mm in short axis without significant interval change.    CHEST WALL AND LOWER NECK: Right-sided chest port extends towards the cavoatrial junction.    ABDOMEN    LIVER/BILIARY TREE: Unremarkable.    GALLBLADDER: Cholelithiasis without findings of acute cholecystitis. On image 141 of series 2 there is slight nonspecific thickening of the gallbladder wall towards the gallbladder fundus measuring 2 mm.    SPLEEN: Similar in appearance mild splenomegaly measuring approximately 14.8 cm in AP dimension. No focal splenic mass.    PANCREAS: Unremarkable.    ADRENAL GLANDS: Unremarkable.    KIDNEYS/URETERS: Absent right kidney. Unremarkable left kidney without hydronephrosis.    STOMACH AND BOWEL: Unremarkable.    APPENDIX: No findings to suggest appendicitis.    ABDOMINOPELVIC CAVITY: No ascites. No pneumoperitoneum. No lymphadenopathy. Stable subcentimeter left periaortic retroperitoneal lymph node on image 156 of series 2 is within range of normal variation.    VESSELS: Unremarkable for patient's age.    PELVIS    REPRODUCTIVE ORGANS: Unremarkable for patient's age.    URINARY BLADDER: Unremarkable.    ABDOMINAL WALL/INGUINAL REGIONS: Unremarkable.    BONES: No acute fracture or suspicious osseous lesion.  Impression: 1. Essentially stable mediastinal and hilar lymphadenopathy. Stable small lung nodules.    2. Slight nonspecific thickening of the gallbladder wall towards the gallbladder fundus measuring 2 mm. Attention to this finding on short interval follow-up imaging in 3 months is recommended to ensure stability and exclude developing gallbladder wall   pathology.    Please see  "above for details and additional findings.    The study was marked in EPIC for significant notification. The study was marked in Epic for follow-up.    Workstation performed: [id number]      LABS:  Lab data are reviewed and documented in HemOnc history.       Lab Results   Component Value Date    HGB 13.7 12/27/2024    HCT 40.6 12/27/2024    MCV 89 12/27/2024     (L) 12/27/2024    WBC 4.30 (L) 12/27/2024    NRBC 0 12/27/2024     Lab Results   Component Value Date     12/16/2017    K 3.6 12/27/2024     12/27/2024    CO2 31 12/27/2024    BUN 18 12/27/2024    CREATININE 1.38 (H) 12/27/2024    GLUF 87 10/05/2024    CALCIUM 9.6 12/27/2024    AST 36 12/27/2024    ALT 58 (H) 12/27/2024    ALKPHOS 45 12/27/2024    PROT 7.3 12/16/2017    BILITOT 1.0 12/16/2017    EGFR 65 12/27/2024       No results found for: \"IRON\", \"TIBC\", \"FERRITIN\"    Lab Results   Component Value Date    ROHCUPGE35 400 02/14/2022       No results for input(s): \"WBC\", \"CREAT\", \"PLT\" in the last 72 hours.        By:  Shreyas Amezcua MD, 1/8/2025, 10:20 AM                                  "

## 2024-12-30 ENCOUNTER — HOSPITAL ENCOUNTER (OUTPATIENT)
Dept: CT IMAGING | Facility: HOSPITAL | Age: 34
Discharge: HOME | End: 2024-12-30
Payer: COMMERCIAL

## 2024-12-30 DIAGNOSIS — C62.11 SEMINOMA OF DESCENDED RIGHT TESTIS (HCC): ICD-10-CM

## 2024-12-30 PROCEDURE — 74177 CT ABD & PELVIS W/CONTRAST: CPT

## 2024-12-30 PROCEDURE — 71260 CT THORAX DX C+: CPT

## 2024-12-30 RX ADMIN — IOHEXOL 100 ML: 350 INJECTION, SOLUTION INTRAVENOUS at 09:21

## 2025-01-08 ENCOUNTER — OFFICE VISIT (OUTPATIENT)
Dept: HEMATOLOGY ONCOLOGY | Facility: CLINIC | Age: 35
End: 2025-01-08
Payer: COMMERCIAL

## 2025-01-08 VITALS
TEMPERATURE: 98.5 F | DIASTOLIC BLOOD PRESSURE: 80 MMHG | WEIGHT: 172 LBS | OXYGEN SATURATION: 99 % | BODY MASS INDEX: 27 KG/M2 | HEART RATE: 93 BPM | RESPIRATION RATE: 18 BRPM | SYSTOLIC BLOOD PRESSURE: 130 MMHG | HEIGHT: 67 IN

## 2025-01-08 DIAGNOSIS — C62.11 SEMINOMA OF DESCENDED RIGHT TESTIS (HCC): Primary | ICD-10-CM

## 2025-01-08 PROCEDURE — 99214 OFFICE O/P EST MOD 30 MIN: CPT | Performed by: INTERNAL MEDICINE

## 2025-01-30 ENCOUNTER — NURSE TRIAGE (OUTPATIENT)
Age: 35
End: 2025-01-30

## 2025-01-30 NOTE — TELEPHONE ENCOUNTER
"B/L hands are numb.  Not having issues gripping things.  Has trouble writing.    Says it is tough to write on a piece of paper.    Feels like his hands are falling asleep.      On his feet, feels numbness on the ball of his feet.  Sometimes he gets a sharp pain.     Is there anything he can take to improve?  Can he take anything to help with these symptoms, other than something natural.  DESCRIPTION (describe complaint in short phrase) new numbness and tingling     SEVERITY (mild, moderate, severe) moderate    ONSET (of THIS SPECIFIC complaint) 3-4 weeks ago    CAUSE (what does the patient think is causing this) chemotherapy    MEDICATIONS (any changes in meds or doses?, Take any meds for relief?) no    OTHER SYMPTOMS (yes or no- if no, just write that. If yes, write the symptoms c/o) no       Answer Assessment - Initial Assessment Questions  1. SYMPTOM: \"What is the main symptom you are concerned about?\" (e.g., weakness, numbness)      Numbness and tingling in hands and sometimes on the ball of his feet.   2. ONSET: \"When did this start?\" (minutes, hours, days; while sleeping)      Started 3 - 4 weeks ago.   3. LAST NORMAL: \"When was the last time you (the patient) were normal (no symptoms)?\"      5 weeks ago.  4. PATTERN \"Does this come and go, or has it been constant since it started?\"  \"Is it present now?\"      constant  5. CARDIAC SYMPTOMS: \"Have you had any of the following symptoms: chest pain, difficulty breathing, palpitations?\"      Not really.  Maybe sometimes difficulty breathing, from being out of shape.   6. NEUROLOGIC SYMPTOMS: \"Have you had any of the following symptoms: headache, dizziness, vision loss, double vision, changes in speech, unsteady on your feet?\" Yes, but also has manier's disease.       Does have a sharp pain in hands once or twice a week. When he stretches his hands out, he feels more symptoms.   7. OTHER SYMPTOMS: \"Do you have any other symptoms?\" no    Protocols used: Neurologic " Deficit-Adult-[place]

## 2025-01-30 NOTE — TELEPHONE ENCOUNTER
Per Dr. Seven sung for his neuropathy.  That referral is fine by me       Message sent to patient. Patient went to fertility clinic in 2024.

## 2025-02-07 ENCOUNTER — TELEPHONE (OUTPATIENT)
Dept: HEMATOLOGY ONCOLOGY | Facility: CLINIC | Age: 35
End: 2025-02-07

## 2025-02-07 ENCOUNTER — HOSPITAL ENCOUNTER (OUTPATIENT)
Dept: INFUSION CENTER | Facility: CLINIC | Age: 35
Discharge: HOME | End: 2025-02-07
Payer: COMMERCIAL

## 2025-02-07 DIAGNOSIS — C62.11 SEMINOMA OF DESCENDED RIGHT TESTIS (HCC): Primary | ICD-10-CM

## 2025-02-07 PROCEDURE — 96523 IRRIG DRUG DELIVERY DEVICE: CPT

## 2025-02-07 NOTE — TELEPHONE ENCOUNTER
Call out to A per patient request.  Spoke with Katelyn patient would not need a new referral, but if patient's spouse wants to be seen she would need a referral from her provider.    Message sent to patient via [employer].

## 2025-02-07 NOTE — PROGRESS NOTES
Patient presents for port flush. Port accessed with positive blood return without incident.AVS declined by patient. Patient is aware of appointment on 4/4/25 at 9:30AM.

## 2025-03-06 ENCOUNTER — PATIENT OUTREACH (OUTPATIENT)
Dept: CASE MANAGEMENT | Facility: OTHER | Age: 35
End: 2025-03-06

## 2025-03-06 DIAGNOSIS — C62.11 SEMINOMA OF DESCENDED RIGHT TESTIS (HCC): Primary | ICD-10-CM

## 2025-03-06 NOTE — PROGRESS NOTES
[facility] received message that patient looking for assistance with talk therapy.     OSW placed order in chart.     OSW outreached to patient, message left.

## 2025-03-07 ENCOUNTER — PATIENT OUTREACH (OUTPATIENT)
Dept: CASE MANAGEMENT | Facility: OTHER | Age: 35
End: 2025-03-07

## 2025-03-07 NOTE — PROGRESS NOTES
[facility]W received message from patient.       Call placed to patient/spouse on speaker.   Discussed counseling services just to process the dx, process, treatment.   OSW provided information on [facility] and Marilynn Sanchez, [facility] [phone]    [facility] has additional Alternative treatments that patient/spouse would like. Patient leaning more toward [facility] at this time.     Patient was encouraged to reach out to [facility] should a need arise or if he needs additional referrals.

## 2025-04-03 ENCOUNTER — HOSPITAL ENCOUNTER (OUTPATIENT)
Dept: INFUSION CENTER | Facility: CLINIC | Age: 35
Discharge: HOME | End: 2025-04-03
Payer: COMMERCIAL

## 2025-04-03 DIAGNOSIS — C62.11 SEMINOMA OF DESCENDED RIGHT TESTIS (HCC): Primary | ICD-10-CM

## 2025-04-03 LAB
AFP-TM SERPL-MCNC: 3 NG/ML (ref 0–9)
ALBUMIN SERPL BCG-MCNC: 4.3 G/DL (ref 3.5–5)
ALP SERPL-CCNC: 43 U/L (ref 34–104)
ALT SERPL W P-5'-P-CCNC: 45 U/L (ref 7–52)
ANION GAP SERPL CALCULATED.3IONS-SCNC: 6 MMOL/L (ref 4–13)
AST SERPL W P-5'-P-CCNC: 34 U/L (ref 13–39)
BASOPHILS # BLD AUTO: 0.01 THOUSANDS/ÂΜL (ref 0–0.1)
BASOPHILS NFR BLD AUTO: 0 % (ref 0–1)
BILIRUB SERPL-MCNC: 0.72 MG/DL (ref 0.2–1)
BUN SERPL-MCNC: 27 MG/DL (ref 5–25)
CALCIUM SERPL-MCNC: 9.6 MG/DL (ref 8.4–10.2)
CHLORIDE SERPL-SCNC: 104 MMOL/L (ref 96–108)
CO2 SERPL-SCNC: 27 MMOL/L (ref 21–32)
CREAT SERPL-MCNC: 1.43 MG/DL (ref 0.6–1.3)
EOSINOPHIL # BLD AUTO: 0.11 THOUSAND/ÂΜL (ref 0–0.61)
EOSINOPHIL NFR BLD AUTO: 3 % (ref 0–6)
ERYTHROCYTE [DISTWIDTH] IN BLOOD BY AUTOMATED COUNT: 12.5 % (ref 11.6–15.1)
GFR SERPL CREATININE-BSD FRML MDRD: 62 ML/MIN/1.73SQ M
GLUCOSE SERPL-MCNC: 91 MG/DL (ref 65–140)
HCG-TM SERPL-SCNC: <0.6 MLU/ML
HCT VFR BLD AUTO: 44 % (ref 36.5–49.3)
HGB BLD-MCNC: 15.3 G/DL (ref 12–17)
IMM GRANULOCYTES # BLD AUTO: 0.01 THOUSAND/UL (ref 0–0.2)
IMM GRANULOCYTES NFR BLD AUTO: 0 % (ref 0–2)
LDH SERPL-CCNC: 180 U/L (ref 140–271)
LYMPHOCYTES # BLD AUTO: 1.22 THOUSANDS/ÂΜL (ref 0.6–4.47)
LYMPHOCYTES NFR BLD AUTO: 30 % (ref 14–44)
MCH RBC QN AUTO: 30.6 PG (ref 26.8–34.3)
MCHC RBC AUTO-ENTMCNC: 34.8 G/DL (ref 31.4–37.4)
MCV RBC AUTO: 88 FL (ref 82–98)
MONOCYTES # BLD AUTO: 0.31 THOUSAND/ÂΜL (ref 0.17–1.22)
MONOCYTES NFR BLD AUTO: 8 % (ref 4–12)
NEUTROPHILS # BLD AUTO: 2.47 THOUSANDS/ÂΜL (ref 1.85–7.62)
NEUTS SEG NFR BLD AUTO: 59 % (ref 43–75)
NRBC BLD AUTO-RTO: 0 /100 WBCS
PLATELET # BLD AUTO: 139 THOUSANDS/UL (ref 149–390)
PMV BLD AUTO: 10.9 FL (ref 8.9–12.7)
POTASSIUM SERPL-SCNC: 4 MMOL/L (ref 3.5–5.3)
PROT SERPL-MCNC: 6.7 G/DL (ref 6.4–8.4)
RBC # BLD AUTO: 5 MILLION/UL (ref 3.88–5.62)
SODIUM SERPL-SCNC: 137 MMOL/L (ref 135–147)
WBC # BLD AUTO: 4.13 THOUSAND/UL (ref 4.31–10.16)

## 2025-04-03 PROCEDURE — 80053 COMPREHEN METABOLIC PANEL: CPT

## 2025-04-03 PROCEDURE — 82105 ALPHA-FETOPROTEIN SERUM: CPT

## 2025-04-03 PROCEDURE — 85025 COMPLETE CBC W/AUTO DIFF WBC: CPT

## 2025-04-03 PROCEDURE — 83615 LACTATE (LD) (LDH) ENZYME: CPT

## 2025-04-03 PROCEDURE — 84702 CHORIONIC GONADOTROPIN TEST: CPT

## 2025-04-03 NOTE — PROGRESS NOTES
Ron Franco  tolerated lab draw via port well with no complications.      Ron Franco is aware of future appt on 5/30 at 9am.     AVS Declined     Left unit in stable condition.

## 2025-04-15 NOTE — ASSESSMENT & PLAN NOTE
This is a 35 y.o. 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

## 2025-04-15 NOTE — PROGRESS NOTES
"Name: Ron Franco      : 1990      MRN: [id number]  Encounter Provider: Shreyas Amezcua MD  Encounter Date: 2025   Encounter department: [facility]  :  Assessment & Plan  Seminoma of descended right testis (HCC)    This is a 35 y.o. c PMHx notable for HTN 2/2 smoking which resolved after he quit smoking, Meniere's disease/vertigo, being seen in consultation for advanced stage pure seminoma of the R testicle s/p resection on surveillance  Orders:    Comprehensive metabolic panel; Future    AFP tumor marker; Future    HCG, tumor marker; Future    LD,Blood; Future    CT chest abdomen pelvis w contrast; Future    Ambulatory referral to Interventional Radiology; Future        No follow-ups on file.    History of Present Illness       Objective   /70 (BP Location: Left arm, Patient Position: Sitting, Cuff Size: Adult)   Pulse 87   Temp (!) 97.3 °F (36.3 °C) (Temporal)   Resp 16   Ht 5' 7\" (1.702 m)   Wt 76.2 kg (168 lb)   SpO2 98%   BMI 26.31 kg/m²         Cancer Stage at diagnosis: IIIA    Referral Physician: No ref. provider found    Primary Care Physician:  No primary care provider on file.     Nickname: Ron    Spouse: Bernie    Has a young infant ( 2024)    Original ECO     Today's ECO    Goals and Barriers:  Current Goal: Minimize effects of disease burden, extend life.   Barriers to accomplishing this: None    Patient's Capacity to Self Care:  Patient is able to self care      This is a 35 y.o. 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 I reviewed relevant office notes, imaging reports and pathology result as well.  I told the patient that this is a case of curable disease and what this means. We discussed that the goal of anti-cancer therapy is to provide best quality of life, extend overall survival, and progression free survival as shown in clinical trials.   I explained the risks/benefits of the proposed cancer therapy: Cisplatin + Etoposide and after discussion including understanding risks of possible life-threatening complications and therapy-related malignancy development, informed consent for blood products and treatment has been signed and obtained.  TNM/Staging At Diagnosis  Cancer Staging   Seminoma of descended right testis (HCC)  Staging form: Testis, AJCC 8th Edition  - Pathologic stage from 5/24/2024: Stage Unknown (pT1b, cN2c, cM1a, S0) - Signed by Shreyas Amezcua MD on 6/23/2024  Stage prefix: Initial diagnosis  Residual tumor (R): R0 - None  [id number] (underlying LN)  Disease Features/Tumor Markers/Genetics  Tumor Marker: AFP, LDH, HCT WNL  6/25/2024: HCG, AFP, LDH WNL  Notable Path Features:   5/24/2024: Right testicle and spermatic cord (radical inguinal orchiectomy):     - Seminoma (4.5 cm).      - No definitive lymph-vascular invasion.     - Tunica vaginalis negative for tumor.     - Surgical margin negative for tumor  Treatment: Cisplatin + Etoposide   Other Supportive care: Zofran, EMLA  Treatment Team Members  Surgeon: Dr. Noris Mauro Onc  Palliative  Labs  Diagnostics  6/10/2024 CT CAP w/c: Multiple pulmonary nodules. No prior study for comparison. Based on current Fleischner Society 2017 Guidelines on incidental pulmonary nodule, patients with a known malignancy are at increased risk of metastasis and should receive initial three-month   follow-up chest CT. Mediastinal and bilateral hilar lymphadenopathy concerning for metastatic disease given the  patient's history.  Bilateral hilar and mediastinal lymphadenopathy. Right hilar lymph node measures 2.9 x 2.1 cm. Subcarinal lymph node is 2.5 x 1.4 cm. Pretracheal lymph node is 2.2 x 1.1 cm. Left hilar lymph node is 1.3 x 1.1 cm.   10/16/2024 CT CAP w/c: SD  5 x 3 mm nodule in the left lower lobe (604-127) has not changed.  3 mm perifissural nodule in the left lower lobe (604-125 is not significantly changed.  Right hilar lymph node conglomeration previously measured 2.9 cm long axis now measuring 1.9 cm. Subcarinal node previously measured 2.5 cm long axis now measuring 1.6 cm. Precarinal node measures 1.7 cm long axis, previously measuring 2.2 cm. Numerous additional smaller nodes are less prominent than on prior exam.  12/30/2024 CT CAP w/c: Essentially stable mediastinal and hilar lymphadenopathy. Stable small lung nodules  stable enlarged right hilar lymph node measuring 1.5 cm in short axis. Additional representative lymph node includes a mildly   enlarged subcarinal lymph node measuring 1.1 cm in short axis, previously approximately 1.0 cm. Left hilar lymph node on image 64 of series 2 measures 7 mm in short axis without significant interval change.  4/16/2025 CT CAP w/c: There is a new 3 mm pulmonary nodule in the right middle lobe. Stable mediastinal and hilar adenopathy as described above.  There is stable mediastinal and hilar adenopathy. Again, there is a mildly enlarged right hilar lymph node measuring 15 mm in short axis (series 2, image 57). There is a subcarinal lymph node measuring 10 mm in short axis. Left hilar lymph node is unchanged measuring approximately 7 mm in short axis (series 2, image 66)    Discussion of decision making    I personally reviewed the following lab results, the image studies, pathology, other specialty/physicians consult notes and recommendations, and outside medical records. I had a lengthy discussion with the patient and shared the work-up findings. We discussed the  diagnosis and management plan as below. I spent 32 minutes reviewing the records (labs, clinician notes, outside records, medical history, ordering medicine/tests/procedures, monitoring of anti-neoplastic toxicities, interpreting the imaging/labs previously done) and coordination of care as well as direct time with the patient today, of which greater than 50% of the time was spent in counseling and coordination of care with the patient/family.      Plan/Labs  Treatment completed  F/u Dietitian prn  IR referral port removal  Restaging CT due in 24 weeks and is ordered along with CMP, AFP/HCG/LDH       Follow Up: 6 months    All questions were answered to the patient's satisfaction during this encounter. The patient knows the contact information for our office and knows to reach out for any relevant concerns related to this encounter. They are to call for any temperature 100.4 or higher, new symptoms including but not restricted to shaking chills, decreased appetite, nausea, vomiting, diarrhea, increased fatigue, shortness of breath or chest pain, confusion, and not feeling the strength to come to the clinic. For all other listed problems and medical diagnosis in their chart - they are managed by PCP and/or other specialists, which the patient acknowledges. Thank you very much for your consultation and making us a part of this patient's care. We are continuing to follow closely with you. Please do not hesitate to reach out to me with any additional questions or concerns.    Shreyas Amezcua MD  Hematology & Medical Oncology Staff Physician             Disclaimer: This document was prepared using [employer] Direct technology. If a word or phrase is confusing, or does not make sense, this is likely due to recognition error which was not discovered during this clinician's review. If you believe an error has occurred, please contact me through [facility] service line for kirill?cation.      ONCOLOGY HISTORY OF PRESENT  ILLNESS        Oncology History   Seminoma of descended right testis (HCC)   5/24/2024 -  Cancer Staged    Staging form: Testis, AJCC 8th Edition  - Pathologic stage from 5/24/2024: Stage IIIA (pT1b, cM1a, S0) - Signed by Shreyas Amezcua MD on 6/26/2024  Stage prefix: Initial diagnosis  Residual tumor (R): R0 - None       6/11/2024 Initial Diagnosis    Seminoma of descended right testis (HCC)     7/15/2024 - 10/11/2024 Chemotherapy    alteplase (CATHFLO), 2 mg, Intracatheter, Every 1 Minute as needed, 4 of 4 cycles  Administration: 2 mg (9/9/2024)  CISplatin (PLATINOL) infusion, 20 mg/m2 = 38.2 mg, Intravenous, Once, 4 of 4 cycles  Administration: 38.2 mg (7/15/2024), 38.2 mg (7/16/2024), 38.2 mg (7/17/2024), 38.2 mg (7/18/2024), 38.2 mg (7/19/2024), 38.2 mg (8/12/2024), 38.2 mg (8/13/2024), 38.2 mg (8/14/2024), 38.2 mg (8/15/2024), 38.2 mg (8/16/2024), 38.2 mg (9/9/2024), 38.2 mg (9/10/2024), 38.2 mg (9/11/2024), 38.2 mg (9/12/2024), 38.2 mg (9/13/2024), 38.2 mg (10/7/2024), 38.2 mg (10/8/2024), 38.2 mg (10/9/2024), 38.2 mg (10/10/2024), 38.2 mg (10/11/2024)  sodium chloride, 500 mL, Intravenous, Once, 4 of 4 cycles  Administration: 500 mL (7/15/2024), 500 mL (7/15/2024), 500 mL (7/16/2024), 500 mL (7/16/2024), 500 mL (7/17/2024), 500 mL (7/17/2024), 500 mL (7/18/2024), 500 mL (7/18/2024), 500 mL (7/19/2024), 500 mL (7/19/2024), 500 mL (8/12/2024), 500 mL (8/12/2024), 500 mL (8/13/2024), 500 mL (8/13/2024), 500 mL (8/14/2024), 500 mL (8/14/2024), 500 mL (8/15/2024), 500 mL (8/15/2024), 500 mL (8/16/2024), 500 mL (8/16/2024), 500 mL (9/10/2024), 500 mL (9/10/2024), 500 mL (9/11/2024), 500 mL (9/11/2024), 500 mL (9/12/2024), 500 mL (9/12/2024), 500 mL (9/13/2024), 500 mL (9/13/2024), 500 mL (10/8/2024), 500 mL (10/8/2024), 500 mL (10/9/2024), 500 mL (10/9/2024), 500 mL (10/10/2024), 500 mL (10/10/2024), 500 mL (10/11/2024), 500 mL (10/11/2024)  fosaprepitant (EMEND) IVPB, 150 mg, Intravenous, Once, 4 of 4  cycles  Administration: 150 mg (7/15/2024), 150 mg (8/12/2024), 150 mg (9/9/2024), 150 mg (10/7/2024)  etoposide (TOPOSAR), 100 mg/m2 = 191 mg, Intravenous, Once, 4 of 4 cycles  Administration: 191 mg (7/15/2024), 191 mg (7/16/2024), 191 mg (7/17/2024), 191 mg (7/18/2024), 191 mg (7/19/2024), 191 mg (8/12/2024), 191 mg (8/13/2024), 191 mg (8/14/2024), 191 mg (8/15/2024), 191 mg (8/16/2024), 191 mg (9/9/2024), 191 mg (9/10/2024), 191 mg (9/11/2024), 191 mg (9/12/2024), 191 mg (9/13/2024), 191 mg (10/7/2024), 191 mg (10/8/2024), 191 mg (10/9/2024), 191 mg (10/10/2024), 191 mg (10/11/2024)           SUBJECTIVE  (INTERVAL HISTORY)      Clotting History None   Bleeding History None   Cancer History R Testicular   Family Cancer History None   H/O Blood/Plt Transfusion None   Tobacco/etoh/drug abuse 1 PPD x 3 years, quit at age 26, no etoh abuse, does recreational marijuana           Occupation Used to be a  at a Car dealership, 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 Was able to run a 5K and feels 90% to where he was before the chemo started.    Pain: none   No issues or concerns from his part except the port is bothering him.    I have reviewed the relevant past medical, surgical, social and family history. I have also reviewed allergies and medications for this patient.    Review of Systems  Baseline weight: 165-175 lbs    Denies F/C, N/V, SOB, CP, HA, rash, itching, gen weakness, melena, hematuria, hematochezia, falls, diarrhea, or constipation       A 10-point review of system was performed, pertinent positive and negative were detailed as above. Otherwise, the 10-point review of system was negative.      Past Medical History:   Diagnosis Date    Hypertension     resolved    Meniere's disease     left sided    PONV (postoperative nausea and vomiting)     Single kidney     left    Vertigo        Past Surgical History:   Procedure  Laterality Date    HERNIA REPAIR      IR PORT PLACEMENT  2024    NASAL SINUS SURGERY Left 2020    NEPHRECTOMY Right     IL ORCHIECTOMY RADICAL TUMOR INGUINAL APPROACH Right 2024    Procedure: ORCHIECTOMY INGUINAL;  Surgeon: Nathan Hamm MD;  Location: [facility];  Service: Urology       Family History   Problem Relation Age of Onset    Hypertension Mother     Heart disease Father         cardiac pacemaker    Hypertension Father        Social History     Socioeconomic History    Marital status: /Civil Union     Spouse name: Not on file    Number of children: Not on file    Years of education: Not on file    Highest education level: Not on file   Occupational History    Not on file   Tobacco Use    Smoking status: Former     Current packs/day: 0.00     Types: Cigarettes     Quit date: 2018     Years since quittin.6     Passive exposure: Past    Smokeless tobacco: Never   Vaping Use    Vaping status: Never Used   Substance and Sexual Activity    Alcohol use: Not Currently    Drug use: Yes     Frequency: 7.0 times per week     Types: Marijuana     Comment: vidhi 24    Sexual activity: Not on file   Other Topics Concern    Not on file   Social History Narrative    Denied: Always uses seat belt    Caffeine use: one 16 oz cups of coffee, 1 cup of soda    Does not exercise     Social Drivers of Health     Financial Resource Strain: Not on file   Food Insecurity: Not on file   Transportation Needs: Not on file   Physical Activity: Not on file   Stress: Not on file   Social Connections: Not on file   Intimate Partner Violence: Not on file   Housing Stability: Not on file       No Known Allergies    Current Outpatient Medications   Medication Sig Dispense Refill    Apple Cider Vinegar 188 MG CAPS Take by mouth      Cholecalciferol (Vitamin D3) 125 MCG (5000 UT) TABS Take 5,000 Units by mouth daily      magnesium 30 MG tablet Take 30 mg by mouth 2 (two) times a day      Omega-3 Fatty Acids  (Fish Oil) 300 MG CAPS Take by mouth      ondansetron (ZOFRAN-ODT) 8 mg disintegrating tablet Take 1 tablet (8 mg total) by mouth every 8 (eight) hours as needed for nausea or vomiting 30 tablet 3    prochlorperazine (COMPAZINE) 10 mg tablet Take 1 tablet (10 mg total) by mouth every 6 (six) hours as needed for nausea or vomiting 30 tablet 1    Pyridoxine HCl (VITAMIN B6 PO) Take by mouth      Turmeric (QC TUMERIC COMPLEX PO) Take by mouth       No current facility-administered medications for this visit.       (Not in a hospital admission)      Objective:     24 Hour Vitals Assessment:     Vitals:    04/23/25 0920   BP: 112/70   Pulse: 87   Resp: 16   Temp: (!) 97.3 °F (36.3 °C)   SpO2: 98%             PHYSICIAN EXAM:    General: Appearance: alert, cooperative, no distress.  HEENT: Normocephalic, atraumatic. No scleral icterus. conjunctivae clear. EOMI.  Chest: No tenderness to palpation. No open wound noted.  Lungs: Clear to auscultation bilaterally, Respirations unlabored.  Cardiac: Regular rate and rhythm, +S1and S2, -r/m/g  Abdomen: Soft, non-tender, non-distended. Bowel sounds are normal.  Extremities:  No edema, cyanosis, clubbing.  Skin: Skin color, turgor are normal. No rashes.  Lymphatics: no palpable supra-cervical, axillary, or inguinal adenopathy  Neurologic: Awake, Alert, and oriented, no gross focal deficits noted b/l.       DATA REVIEW:    Pathology Result:    Final Diagnosis   Date Value Ref Range Status   05/24/2024   Final    A.  Right testicle and spermatic cord (radical inguinal orchiectomy):     - Seminoma (4.5 cm).      - No definitive lymph-vascular invasion.     - Tunica vaginalis negative for tumor.     - Surgical margin negative for tumor.          Image Results:   Image result are reviewed and documented in Hematology/Oncology history    CT chest abdomen pelvis w contrast  Narrative: CT CHEST, ABDOMEN AND PELVIS WITH IV CONTRAST    INDICATION:   Malignant neoplasm of descended right  testis. .    COMPARISON: December 2024 and October 2024.    TECHNIQUE: CT examination of the chest, abdomen and pelvis was performed. Multiplanar 2D reformatted images were created from the source data.    This examination, like all CT scans performed in the [facility], was performed utilizing techniques to minimize radiation dose exposure, including the use of iterative reconstruction and automated exposure control. Radiation dose length   product (DLP) for this visit: 925.92.    IV Contrast: 100 cc of Omnipaque 350.  Enteric Contrast: Enteric contrast was not administered.    FINDINGS:    CHEST    LUNGS: Lung windows demonstrate the lungs to be well aerated. There is a stable 4 mm nodule in the left lower lobe (series 3, image 128). There is a stable 3 mm perifissural nodule along the left major fissure (series 3, image 124). There is a stable   calcified 2 mm nodule in the right lower lobe. There is a new 3 mm nodule in the right middle lobe (series 3, image 150). There is no focal consolidation. Central airways are patent.    PLEURA:  Unremarkable.    HEART/GREAT VESSELS: Heart size is within normal limits. Port catheter tip is noted in the superior vena cava/right atrial junction. The aorta is normal in caliber. No thoracic aortic aneurysm. Central pulmonary arteries are patent and normal in caliber.    MEDIASTINUM AND NOEL: There is stable mediastinal and hilar adenopathy. Again, there is a mildly enlarged right hilar lymph node measuring 15 mm in short axis (series 2, image 57). There is a subcarinal lymph node measuring 10 mm in short axis. Left   hilar lymph node is unchanged measuring approximately 7 mm in short axis (series 2, image 66).    CHEST WALL AND LOWER NECK: No acute chest wall abnormality is identified. Visualized thyroid gland is unremarkable. There is no significant axillary adenopathy. Right-sided port is noted in the anterior chest wall.    ABDOMEN    LIVER/BILIARY TREE:   Unremarkable.    GALLBLADDER 5 mm high attenuation focus in the gallbladder is unchanged likely representing a gallstone or polyp. There is mild thickening of the gallbladder fundus likely related to fundal adenomyomatosis, unchanged.    SPLEEN: Mildly enlarged measuring approximately 14.8 cm in AP dimension, unchanged. No focal abnormality.    PANCREAS:  Unremarkable.    ADRENAL GLANDS:  Unremarkable.    KIDNEYS/URETERS: Right kidney is absent. Left kidney is unremarkable. No masses are identified. There is no hydronephrosis.    STOMACH AND BOWEL: Grossly unremarkable.    APPENDIX:  No findings to suggest appendicitis.    ABDOMINOPELVIC CAVITY:  No ascites.  No pneumoperitoneum.  No significant lymphadenopathy. There is a stable left para-aortic lymph node measuring 6 mm in short axis.    VESSELS:  Unremarkable for patient's age.    PELVIS    REPRODUCTIVE ORGANS:  Unremarkable for patient's age.    URINARY BLADDER: Moderately distended. Suggestion of possible mild bladder wall thickening, unchanged.    ABDOMINAL WALL/INGUINAL REGIONS:  There is a small fat-containing umbilical hernia, unchanged from previous examination. Stable small fat-containing bilateral inguinal hernias.    OSSEOUS STRUCTURES:  No acute fracture or destructive osseous lesion.  Impression: 1. There is a new 3 mm pulmonary nodule in the right middle lobe. Given the patient's history of malignancy, follow-up in 6 months is recommended to confirm stability.    2. Stable mediastinal and hilar adenopathy as described above.    3. Stable mild splenomegaly.    4. There is stable mild focal thickening at the gallbladder fundus likely representing fundal adenomyomatosis. There is a small high attenuation focus in the gallbladder likely representing a gallstone. There is no evidence of acute cholecystitis.    5. Please see above text for additional findings and details.    Electronically signed: 04/17/2025 12:26 PM Sandhya Velasquez MD      LABS:  Lab  "data are reviewed and documented in HemOnc history.       Lab Results   Component Value Date    HGB 15.3 04/03/2025    HCT 44.0 04/03/2025    MCV 88 04/03/2025     (L) 04/03/2025    WBC 4.13 (L) 04/03/2025    NRBC 0 04/03/2025     Lab Results   Component Value Date     12/16/2017    K 4.0 04/03/2025     04/03/2025    CO2 27 04/03/2025    BUN 27 (H) 04/03/2025    CREATININE 1.43 (H) 04/03/2025    GLUF 87 10/05/2024    CALCIUM 9.6 04/03/2025    AST 34 04/03/2025    ALT 45 04/03/2025    ALKPHOS 43 04/03/2025    PROT 7.3 12/16/2017    BILITOT 1.0 12/16/2017    EGFR 62 04/03/2025       No results found for: \"IRON\", \"TIBC\", \"FERRITIN\"    Lab Results   Component Value Date    XMQWISBK20 400 02/14/2022       No results for input(s): \"WBC\", \"CREAT\", \"PLT\" in the last 72 hours.        By:  Shreyas Amezcua MD, 4/23/2025, 9:33 AM                                  "

## 2025-04-16 ENCOUNTER — HOSPITAL ENCOUNTER (OUTPATIENT)
Dept: CT IMAGING | Facility: HOSPITAL | Age: 35
Discharge: HOME | End: 2025-04-16
Payer: COMMERCIAL

## 2025-04-16 DIAGNOSIS — C62.11 SEMINOMA OF DESCENDED RIGHT TESTIS (HCC): ICD-10-CM

## 2025-04-16 PROCEDURE — 71260 CT THORAX DX C+: CPT

## 2025-04-16 PROCEDURE — 74177 CT ABD & PELVIS W/CONTRAST: CPT

## 2025-04-16 RX ADMIN — IOHEXOL 100 ML: 350 INJECTION, SOLUTION INTRAVENOUS at 07:23

## 2025-04-23 ENCOUNTER — TELEPHONE (OUTPATIENT)
Dept: HEMATOLOGY ONCOLOGY | Facility: CLINIC | Age: 35
End: 2025-04-23

## 2025-04-23 ENCOUNTER — OFFICE VISIT (OUTPATIENT)
Dept: HEMATOLOGY ONCOLOGY | Facility: CLINIC | Age: 35
End: 2025-04-23
Payer: COMMERCIAL

## 2025-04-23 VITALS
OXYGEN SATURATION: 98 % | RESPIRATION RATE: 16 BRPM | BODY MASS INDEX: 26.37 KG/M2 | SYSTOLIC BLOOD PRESSURE: 112 MMHG | DIASTOLIC BLOOD PRESSURE: 70 MMHG | HEART RATE: 87 BPM | WEIGHT: 168 LBS | TEMPERATURE: 97.3 F | HEIGHT: 67 IN

## 2025-04-23 DIAGNOSIS — C62.11 SEMINOMA OF DESCENDED RIGHT TESTIS (HCC): Primary | ICD-10-CM

## 2025-04-23 PROCEDURE — 99214 OFFICE O/P EST MOD 30 MIN: CPT | Performed by: INTERNAL MEDICINE

## 2025-04-23 NOTE — TELEPHONE ENCOUNTER
Patient has port removal scheduled for 5/22. Please cancel catheter maintenance infusion appointments after 5/22.    Thank you!

## 2025-05-13 ENCOUNTER — TELEPHONE (OUTPATIENT)
Dept: INTERVENTIONAL RADIOLOGY/VASCULAR | Facility: HOSPITAL | Age: 35
End: 2025-05-13

## 2025-05-14 ENCOUNTER — TELEPHONE (OUTPATIENT)
Dept: INTERVENTIONAL RADIOLOGY/VASCULAR | Facility: HOSPITAL | Age: 35
End: 2025-05-14

## 2025-05-22 ENCOUNTER — HOSPITAL ENCOUNTER (OUTPATIENT)
Dept: INTERVENTIONAL RADIOLOGY/VASCULAR | Facility: HOSPITAL | Age: 35
Discharge: HOME | End: 2025-05-22
Attending: INTERNAL MEDICINE
Payer: COMMERCIAL

## 2025-05-22 ENCOUNTER — TELEPHONE (OUTPATIENT)
Dept: INTERVENTIONAL RADIOLOGY/VASCULAR | Facility: HOSPITAL | Age: 35
End: 2025-05-22

## 2025-05-22 VITALS
TEMPERATURE: 98.2 F | HEIGHT: 67 IN | SYSTOLIC BLOOD PRESSURE: 122 MMHG | WEIGHT: 165 LBS | DIASTOLIC BLOOD PRESSURE: 72 MMHG | BODY MASS INDEX: 25.9 KG/M2 | OXYGEN SATURATION: 100 % | RESPIRATION RATE: 18 BRPM | HEART RATE: 84 BPM

## 2025-05-22 DIAGNOSIS — C62.11 SEMINOMA OF DESCENDED RIGHT TESTIS (HCC): ICD-10-CM

## 2025-05-22 PROCEDURE — 36590 REMOVAL TUNNELED CV CATH: CPT

## 2025-05-22 PROCEDURE — 99152 MOD SED SAME PHYS/QHP 5/>YRS: CPT | Performed by: RADIOLOGY

## 2025-05-22 PROCEDURE — 36590 REMOVAL TUNNELED CV CATH: CPT | Performed by: RADIOLOGY

## 2025-05-22 RX ORDER — ACETAMINOPHEN 325 MG/1
650 TABLET ORAL EVERY 4 HOURS PRN
Status: ACTIVE | OUTPATIENT
Start: 2025-05-22

## 2025-05-22 RX ORDER — FENTANYL CITRATE 50 UG/ML
INJECTION, SOLUTION INTRAMUSCULAR; INTRAVENOUS AS NEEDED
Status: COMPLETED | OUTPATIENT
Start: 2025-05-22 | End: 2025-05-22

## 2025-05-22 RX ORDER — MIDAZOLAM HYDROCHLORIDE 2 MG/2ML
INJECTION, SOLUTION INTRAMUSCULAR; INTRAVENOUS AS NEEDED
Status: COMPLETED | OUTPATIENT
Start: 2025-05-22 | End: 2025-05-22

## 2025-05-22 RX ORDER — LIDOCAINE HYDROCHLORIDE AND EPINEPHRINE 10; 10 MG/ML; UG/ML
INJECTION, SOLUTION INFILTRATION; PERINEURAL AS NEEDED
Status: COMPLETED | OUTPATIENT
Start: 2025-05-22 | End: 2025-05-22

## 2025-05-22 RX ADMIN — LIDOCAINE HYDROCHLORIDE,EPINEPHRINE BITARTRATE 7 ML: 10; .01 INJECTION, SOLUTION INFILTRATION; PERINEURAL at 11:14

## 2025-05-22 RX ADMIN — FENTANYL CITRATE 50 MCG: 50 INJECTION, SOLUTION INTRAMUSCULAR; INTRAVENOUS at 11:11

## 2025-05-22 RX ADMIN — MIDAZOLAM HYDROCHLORIDE 1 MG: 1 INJECTION, SOLUTION INTRAMUSCULAR; INTRAVENOUS at 11:10

## 2025-05-22 NOTE — DISCHARGE INSTRUCTIONS
[facility] Interventional Radiology        [address] [place] [address]             Phone:  [phone]                  IR Scheduling: [phone]          Implanted Venous Access Port Removal    WHAT YOU NEED TO KNOW:   An implanted venous access port is a device used to give treatments and take blood. It may also be called a central venous access device (CVAD). The port is a small container that is placed under your skin, usually in your upper chest. A port can also be placed in your arm or abdomen. The port is attached to a catheter that enters a large vein.     DISCHARGE INSTRUCTIONS:   Resume your normal diet. Small sips of flat soda will help with mild nausea.    Prevent an infection:     Wash your hands often.  Use soap and water. Clean your hands before and  after you care for your incision.    Check your skin for infection every day.  Look for redness, swelling, or fluid oozing from the incision site. Dressing may come off in 24 hours. Medical glue will peel off on its own in 5 to 10 days. You may shower 24 hours after procedure.     Follow up with your healthcare provider as directed  Write down your questions so you remember to ask them during your visits.     Activity:  You may return to your daily activities when the area heals.     Contact Interventional Radiology at [phone] ([facility] PATIENTS: Contact Interventional Radiology at [phone]) ([facility] PATIENTS: Contact Interventional Radiology at [phone]) if:     You have a fever.     You have persistent nausea.    Your inciscion site is red, swollen, or draining pus.    You have questions or concerns about your condition or care.    Seek care immediately or call 911 if:  Blood soaks through your bandage.    The skin over or around your incision breaks open.    Your heart is jumping or fluttering.    You have a headache, blurred vision, and feel confused.    You have pain in your arm, neck, shoulder, or  chest.    You have trouble breathing that is getting worse over time.    Procedural Sedation   WHAT YOU NEED TO KNOW:   Procedural sedation is medicine used during procedures to help you feel relaxed and calm. You will remember little to none of the procedure. After sedation you may feel tired, weak, or unsteady on your feet. You may also have trouble concentrating or short-term memory loss. These symptoms should go away in 24 hours or less.   DISCHARGE INSTRUCTIONS:   Call 911 or have someone else call for any of the following:   You have sudden trouble breathing.     You cannot be woken.     Contact Interventional Radiology at [phone]   [facility] PATIENTS: Contact Interventional Radiology at [phone] [facility] PATIENTS: Contact Interventional Radiology at [phone]) if any of the following occur:      You have a severe headache or dizziness.     Your heart is beating faster than usual.    You have a fever or chills.     Your skin is itchy, swollen, or you have a rash.     You have nausea or are vomiting for more than 8 hours after the procedure.      You have questions or concerns about your condition or care.  Self-care:   Have someone stay with you for 24 hours. This person can drive you to errands and help you do things around the house. This person can also watch for problems.      Rest and do quiet activities for 24 hours. Do not exercise, ride a bike, or play sports. Stand up slowly to prevent dizziness and falls. Take short walks around the house with another person. Slowly return to your usual activities the next day.      Do not drive or use dangerous machines or tools for 24 hours. You may injure yourself or others. Examples include a lawnmower, saw, or drill. Do not return to work for 24 hours if you use dangerous machines or tools for work.      Do not make important decisions for 24 hours. For example, do not sign important papers or invest money.      Drink liquids as directed. Liquids help  flush the sedation medicine out of your body. Ask how much liquid to drink each day and which liquids are best for you.      Eat small, frequent meals to prevent nausea and vomiting. Start with clear liquids such as juice or broth. If you do not vomit after clear liquids, you can eat your usual foods.      Do not drink alcohol or take medicines that make you drowsy. This includes medicines that help you sleep and anxiety medicines. Ask your healthcare provider if it is safe for you to take pain medicine.  Follow up with your healthcare provider as directed: Write down your questions so you remember to ask them during your visits.

## 2025-05-22 NOTE — BRIEF OP NOTE (RAD/CATH)
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 Seminoma of descended right testis (HCC)         Postoperative diagnosis: Same.    Surgeon: Dontrell Laurent MD     Assistant: None. No qualified resident was available.    Blood loss: minimal     Specimens: none     Findings: uneventful removal of right chest port     Complications: None immediate.    Anesthesia: conscious sedation

## 2025-05-22 NOTE — H&P
"Interventional Radiology  History and Physical 5/22/2025     Ron Franco   1990   [id number]    H&P reviewed. There have been no interval changes since the time the H&P was written.    /81   Pulse 79   Temp 98 °F (36.7 °C) (Temporal)   Resp 18   Ht 5' 7\" (1.702 m)   Wt 74.8 kg (165 lb)   SpO2 98%   BMI 25.84 kg/m²     Prior imaging was reviewed.  Presents today for removal of right chest port.  Seminoma, completed therapy.  Procedure discussed and all questions answered.  No problems with sedation previously.    Informed written consent was obtained.    Dontrell Laurent MD    "

## 2025-07-29 ENCOUNTER — TELEPHONE (OUTPATIENT)
Age: 35
End: 2025-07-29

## (undated) DEVICE — BETHLEHEM UNIVERSAL MINOR GEN: Brand: CARDINAL HEALTH

## (undated) DEVICE — ROSEBUD DISSECTORS: Brand: DEROYAL

## (undated) DEVICE — SPONGE STICK WITH PVP-I: Brand: KENDALL

## (undated) DEVICE — EXOFIN PRECISION PEN HIGH VISCOSITY TOPICAL SKIN ADHESIVE: Brand: EXOFIN PRECISION PEN, 1G

## (undated) DEVICE — SUT VICRYL 2-0 SH 27 IN UNDYED J417H

## (undated) DEVICE — SCROTAL SUPPORT LGE

## (undated) DEVICE — SCD SEQUENTIAL COMPRESSION COMFORT SLEEVE MEDIUM KNEE LENGTH: Brand: KENDALL SCD

## (undated) DEVICE — SUT MONOCRYL 4-0 PS-2 18 IN Y496G

## (undated) DEVICE — SUT SILK 0 30 IN A306H

## (undated) DEVICE — INTENDED FOR TISSUE SEPARATION, AND OTHER PROCEDURES THAT REQUIRE A SHARP SURGICAL BLADE TO PUNCTURE OR CUT.: Brand: BARD-PARKER SAFETY BLADES SIZE 15, STERILE

## (undated) DEVICE — PENROSE DRAIN, 18 X 3 8: Brand: CARDINAL HEALTH

## (undated) DEVICE — PLUMEPEN PRO 10FT

## (undated) DEVICE — ADHESIVE SKIN CLOSURE SYS EXOFIN FUSION 22CM

## (undated) DEVICE — VESSEL LOOP MAXI - RED

## (undated) DEVICE — SUT SILK 0 SH 30 IN K834H

## (undated) DEVICE — SPECIMEN CONTAINER STERILE PEEL PACK

## (undated) DEVICE — TUBING SUCTION 5MM X 12 FT

## (undated) DEVICE — SUT PROLENE 2-0 SH 36 IN 8523H

## (undated) DEVICE — DRESSING GUAZE ADH BORDER 4 X 4 IN

## (undated) DEVICE — SCROTAL SUPPORT XL

## (undated) DEVICE — 2000CC GUARDIAN II: Brand: GUARDIAN